# Patient Record
Sex: FEMALE | Race: WHITE | Employment: OTHER | URBAN - METROPOLITAN AREA
[De-identification: names, ages, dates, MRNs, and addresses within clinical notes are randomized per-mention and may not be internally consistent; named-entity substitution may affect disease eponyms.]

---

## 2017-01-03 ENCOUNTER — OFFICE VISIT (OUTPATIENT)
Dept: NEUROPSYCHOLOGY | Facility: CLINIC | Age: 82
End: 2017-01-03

## 2017-01-03 DIAGNOSIS — F02.80 LATE ONSET ALZHEIMER'S DISEASE WITHOUT BEHAVIORAL DISTURBANCE (H): Primary | ICD-10-CM

## 2017-01-03 DIAGNOSIS — G30.1 LATE ONSET ALZHEIMER'S DISEASE WITHOUT BEHAVIORAL DISTURBANCE (H): Primary | ICD-10-CM

## 2017-01-03 NOTE — PROGRESS NOTES
The patient was seen for neuropsychological evaluation at the request of Yadira Portillo for the purposes of diagnostic clarification and treatment planning.  One hour of face-to-face testing were provided by this writer.  Please see Dr. Jacquie Bates's report for a full interpretation of the findings.  Miriam Mallory  Psychometrist

## 2017-01-11 ENCOUNTER — TELEPHONE (OUTPATIENT)
Dept: FAMILY MEDICINE | Facility: CLINIC | Age: 82
End: 2017-01-11

## 2017-01-11 NOTE — PROGRESS NOTES
Neuropsychology Laboratory  [facility]  [address]  [place]  [address]  [phone]    NEUROPSYCHOLOGICAL EVALUATION      RELEVANT HISTORY AND REASON FOR REFERRAL:    Rhianna Gamboa is an 83-year-old  white woman showing signs of memory and functional decline, concerning for possible dementia.  The neurological history is unremarkable.  The general medical history is noteworthy for malignant melanoma of the right finger requiring partial amputation, anemia, osteoporosis, and hypothyroidism.  The only brain scan available is an 8/12/09 study, done to assess bilateral hearing loss and vertigo, which showed general atrophy and white matter changes consistent with small vessel ischemic disease.  This neuropsychological evaluation is requested by neurologist, Dr. Yadira Portillo, to assess brain functioning in detail and aid in diagnostic clarification and treatment planning.      The third of four children, she was born in [place] and grew up there, reared by her parents.  Her father was an  for [employer], her mother held factory jobs.  Ms. Gamboa reports  mediocre  grades.  Nevertheless she graduated from high school with no grade repeats.  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  Her only marriage, which lasted about 60 years, ended with the death of her  three years ago.  Only one of their five children live in [place].  Ms. Gamboa lives alone in a [facility] apartment.      BEHAVIORAL OBSERVATIONS AND CLINICAL INTERVIEW FINDINGS:    She arrived early, accompanied by her daughter, Arelis, who set up the appointment for her.  The patient presents as a heavyset older woman with short graying hair, neatly dressed in a fuchsia colored sweater, jeans, knit cap, and cold weather outerwear.  Mood was euthymic, affect appropriate.      She s lived alone  since her spouse  three years ago.  She does not receive regular services in her senior apartment, but has the option of having daily checks if needed in the future.  She manages all basic self-cares and keeps the apartment clean and tidy.  But there s some concern about medication management capabilities.  Her daughter fills a dated pill box weekly, still she doesn t always remember to take them.  Her daughter notices she forgets recent events (such as going out to dinner) and commonly loses personal items such as her car keys, credit card, etc.  She has been driving short distances during non-peak hours, but the family thinks it may be time for her to give up her driving privileges, partly due to physical frailty and questionable reaction times.  She s been a little slow to learn new procedures (how to operate the coffee machine).      She leads a fairly sedentary life.  She takes the paper and watches TV, but cannot easily recall any significant current events.  At first she didn t know who won the recent presidential election, but when told, it seemed to come back to her.  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      There s not history of head trauma, stroke symptoms, seizures, or diseases of the brain.  She has worked with a physical therapist to improve mobility, her problems attributed to unilateral hip weakness.  Surgeries include knee replacement and partial amputation of the right middle finger to treat melanoma, with regular, ongoing follow-up.      She has always been even-tempered, not prone to mood or personality disturbance.  She has not sought or needed mental health treatment of any kind.  They don t notice any change in disposition or personality.      She has never been a tobacco or regular drug user, nor has she used alcohol on a regular basis.      The current medications are levothyroxine, raloxifene, simvastatin, oxybutyrin,  cetirizine, Ranitidine, naproxen sodium, vitamin B-12, and baby aspirin.      There is no clear family history of dementia or other central nervous system diseases.  A maternal grandfather was a heavy drinker.  The maternal grandmother had breast cancer.  She has a sister with heart disease.      Throughout testing she was fully cooperative and socially appropriate, sufficiently alert and attentive.  She had no obvious trouble understanding or following directives, and seemed to make an earnest effort throughout.  Word searching was noticeable on all verbally demanding tests.  She was observed to be somewhat off-balanced and slow to stand, with a shuffling gait.  Results are considered technically valid and an accurate reflection of current, cognitive capabilities.      NEUROPSYCHOLOGICAL FINDINGS:    Select intellectual abilities were assessed with subtests from the Wechsler Adult Intelligence Scale-IV.  Auditory attention span on a digit sequence learning exercise (Digit Span) was impaired.  She could repeat up to five digits (inconsistently) in the forward direction, but only two in the reverse order.  Speeded graphomotor learning (Coding) was below average.  She was accurate but slow on this timed transcription task.  Social understanding, practical problem solving, and verbal reasoning (Comprehension) was also below average.  Word knowledge and expressive communicability (Vocabulary) was low average.  Visuospatial processing and constructional abilities (Block Design) were a relative strength, in the average range.      She was disoriented to time, one day off on the day of the week, two years off on the year, and an hour and 15 minutes off on the time of day.  Immediate memory for two story passages from the Wechsler Memory Scale-Revised was impaired with only one of 50 story elements recalled immediately after presentation.  Recall of the stories 30 minutes later was borderline impaired, and she actually  recalled a couple of details from the first story not originally reported, but remembered nothing of the second story, despite cueing.  Acquisition of a word list (Renard Auditory Verbal Learning Test) was borderline impaired for learning over trials, with no more than two or three words recalled after each trial.  Retention of the list was impaired (nil) after a brief distractor exercise and longer delay.  None of the figures were recognized when presented among a list of foils.  Immediate memory for figural material (four designs from the WMS) was borderline impaired, with significant perseverations.  None of the figures were freely recalled after a delay, and she did not benefit from visual cues, nor did she recognize any of the figures when presented in multiple choice format.  Copy drawings of three Hair-Gestalt figures were fairly well executed despite workover and simplifications.  One of the figures was recalled immediately after presentation, which is grossly within normal limits for her age.      Nonverbal associative fluency on the Make A Figure Test (producing novel designs under time constraints) was below average, and she had difficulty monitoring performance, repeating some of the designs.  Her copy drawing of a complex figure (Renard Osterreith) was characterized by placement and proportional errors, but still within the normal range for her age.      Comprehension, as measured by the Token Test, was mildly impaired.  All of the errors were on the more advanced items in which she had to comprehend and carry out multistep commands.  Verbal associative fluency on the Controlled Oral Word Association Test (generating words beginning with target letters) was impaired with just seven countable responses produced across the three 60-second trials.  Confrontation naming on the [place] Naming Test was mildly impaired, with 45 of 60 pictured items correctly, spontaneously named.  There were several perseverative  type errors, but no clear instances of visual misperception.      Fine motor speed and dexterity (Grooved Pegboard) was mildly impaired (slowed) bilaterally, the left (nondominant) hand slightly faster than the right.  She is missing the tip of her right middle finger, which may have slowed her down a bit using that hand.    A biletter cancellation exercise requiring efficient visual scanning and sustained vigilance was completed very slowly and still with 18 omission and commission errors, spread throughout the page.      CONCLUSIONS AND RECOMMENDATIONS:    This pleasant 83-year-old woman has been  for the last three years and lives alone in a senior apartment, with oversight from her daughter.  She is managing ADLs and keeps the apartment tidy, but is not reliable in remembering medications, and shows signs of forgetfulness.  She continues to drive, albeit short distances during non-peak travel time, but her family is becoming concerned about her ability to safely manage that, partly because of her physically frailty and slowed reaction times.  The neurological and psychiatric histories are unremarkable.  The only major health problem has been melanoma of the right finger, treated with partial amputation.  A brain MRI taken on 8/12/09, to assess bilateral hearing loss and vertigo, showed general atrophy and white matter changes consistent with small vessel ischemic disease.      The test findings are abnormal.  She is disoriented to time with very poor memory under verbal and nonverbal conditions.  That is, her ability to learn and retain new information of either a verbal or figural sort is greatly compromised.  Auditory attention span/short term memory is also impaired.  Psychomotor speeds are mildly slowed bilaterally.  Speeded word retrieval is quite impaired while verbal comprehension and confrontation naming are mildly impaired.  Drawings are well-executed and there are no signs of visual  misperception (agnosia).  She is slow and inaccurate on a letter cancellation exercise, but there are no indications of a visual field cut or hemispatial visual neglect on any of the visually demanding tasks.  Drawings are fairly well-executed.  She is perseverative on some of the tasks.  Relative strengths include below average processing speeds and verbal comprehension, low average vocabulary, and average visuospatial processing.      These abnormalities are not attributable to normal aging, and instead imply multifocal brain disease, with particular implications for the temporal lobes.  The pattern of findings and gradual decline is most concerning for an early to moderately advanced Alzheimer s disease.      At this point she is managing in her senior apartment with oversight from her daughter.  She seems to be managing basic self-cares and keeping her environment neat, but does not reliably remember to take medications, despite a dated pillbox.  It s recommended that someone call or physically check on her daily to remind her to take the medications.  A trusted family member should be involved in any major decisions, including financial management and healthcare decisions.  I share her family s concern about driving safety, in part because of her physical condition, but that is best assessed through a formal driving test.  Fortunately, she remains in good spirits and shows no signs of emotional or behavioral disturbance.  Family members should be aware that this is likely a progressive disease that will necessitate and more assistance and oversight in the foreseeable future.        Jacquie Bates Psy.D.   Licensed Psychologist, L.P. [id number]  Diplomate in Clinical Neuropsychology, [facility]    The diagnostic impression for the purposes of this evaluation is Alzheimer s disease, late onset, without behavior or emotional disturbance.  This evaluation included approximately three hours of testing administered by a  psychometrist with interpretation by a neuropsychologist (CPT 97607) and an additional three hours of professional time spent on the interview, data integration, record review, and report preparation (CPT 37712).    DDR: (AST)

## 2017-01-11 NOTE — TELEPHONE ENCOUNTER
Reason for Call:  Other hip pain-otc recommendations    Detailed comments: pt's daughter calling stating she wouldn't be able to get her mother out of the house and to her appt in time and was wondering if Dr Olivo had any otc suggestions to help with her mothers hip pain.    Phone Number Patient can be reached at: Other phone number:  Arelis [phone]    Best Time: any    Can we leave a detailed message on this number? YES    Call taken on 1/11/2017 at 10:54 AM by Nhung Winters

## 2017-01-11 NOTE — PROGRESS NOTES
LAU ORIENTATION TEST WAIS-IV                                  Raw             Age Scaled    Score  77  Vocabulary  28     8      Block Design  24   10        WECHSLER MEMORY SCALES Coding  27     7        Immed.   30 Min Digit Span  12     4     Logical Memory  0/1   2/0  Comprehension  15     7    Visual Reproduction   2     0     30 Minute Recognition   0    PSYCHOMOTOR TESTS       BRIDGETT AUDITORY VERBAL LEARNING TEST  Right     Left    Grooved Pegboard       126    122       I  II  III IV  V VI VII  Drops: 1          Drops: 1     2   2   2   3   3   2   0      MAKE A FIGURE TEST    30 Minute Recall   0         30 Minute Recognition   0      Score    9     Intrusions   0       CONTROLLED WORD ASSOCIATION TEST   LETTER CANCELLATION TEST     Score  7     Time  358   Errors     18      TOKEN TEST   DEWITT-GESTALT (3-figure)     Score  153    Recall    0.5         BOSTON NAMING TEST        Score  45         BRIDGETT COMPLEX FIGURE TEST         Raw Score T-score %tile    Copy  27   --   >16

## 2017-01-11 NOTE — TELEPHONE ENCOUNTER
Arelis called and advised mom is already on aleve.  Can only take tylenol can try the OTC lidocaine patches.  Heat.  Needs appt for controlled pain medication. Sarah FLORES RN  Per daughter no trauma to the hip area no fall. Sarah FLORES RN

## 2017-02-15 ENCOUNTER — OFFICE VISIT (OUTPATIENT)
Dept: NEUROLOGY | Facility: CLINIC | Age: 82
End: 2017-02-15
Payer: COMMERCIAL

## 2017-02-15 VITALS
DIASTOLIC BLOOD PRESSURE: 69 MMHG | RESPIRATION RATE: 16 BRPM | SYSTOLIC BLOOD PRESSURE: 142 MMHG | BODY MASS INDEX: 27.97 KG/M2 | HEART RATE: 92 BPM | WEIGHT: 152 LBS | HEIGHT: 62 IN

## 2017-02-15 DIAGNOSIS — G30.1 LATE ONSET ALZHEIMER'S DISEASE WITHOUT BEHAVIORAL DISTURBANCE (H): Primary | ICD-10-CM

## 2017-02-15 DIAGNOSIS — F02.80 LATE ONSET ALZHEIMER'S DISEASE WITHOUT BEHAVIORAL DISTURBANCE (H): Primary | ICD-10-CM

## 2017-02-15 PROCEDURE — 99215 OFFICE O/P EST HI 40 MIN: CPT | Performed by: PSYCHIATRY & NEUROLOGY

## 2017-02-15 NOTE — PROGRESS NOTES
ESTABLISHED PATIENT NEUROLOGY NOTE    DATE OF VISIT: 2/15/2017  MRN: [id number]  PATIENT NAME: Rhianna Gamboa  YOB: 1933    Chief Complaint   Patient presents with     RECHECK     Memory Issues, Results Neuropsych      SUBJECTIVE:                                                      HISTORY OF PRESENT ILLNESS:  Rhianna is here for follow up to discuss the Neuropsych test results. The patients results are consistent with Alzheimer's Disease. The patient is accompanied by her daughter in clinic today. One concern that has come up in the past is medication compliance and help around home. The patient now has family checking in most days with home health aides coming to visit for 3 hours twice per week. This has helped with the medication issue, simple housekeeping and also keeps the patient more mentally-engaged (they exercise and play games with her too). One concern was having someone work with the patient to do her physical therapy exercises for balance. She needs encouragement to do these. The patient tells her daughter that she does not need these services, but is not upset about the family's assistance to continue. No concerns about behavior, personality changes. She has not been driving, but the patient's daughter asks if I can reiterate the safety concerns and the reasoning for her not driving. The patient says she has no concerns today.     Per Dr. Bates's documentation (1.3.17):  CONCLUSIONS AND RECOMMENDATIONS:     This pleasant 83-year-old woman has been  for the last three years and lives alone in a senior apartment, with oversight from her daughter. She is managing ADLs and keeps the apartment tidy, but is not reliable in remembering medications, and shows signs of forgetfulness. She continues to drive, albeit short distances during non-peak travel time, but her family is becoming concerned about her ability to safely manage that, partly because of her physically frailty and slowed  reaction times. The neurological and psychiatric histories are unremarkable. The only major health problem has been melanoma of the right finger, treated with partial amputation. A brain MRI taken on 8/12/09, to assess bilateral hearing loss and vertigo, showed general atrophy and white matter changes consistent with small vessel ischemic disease.      The test findings are abnormal. She is disoriented to time with very poor memory under verbal and nonverbal conditions. That is, her ability to learn and retain new information of either a verbal or figural sort is greatly compromised. Auditory attention span/short term memory is also impaired. Psychomotor speeds are mildly slowed bilaterally. Speeded word retrieval is quite impaired while verbal comprehension and confrontation naming are mildly impaired. Drawings are well-executed and there are no signs of visual misperception (agnosia). She is slow and inaccurate on a letter cancellation exercise, but there are no indications of a visual field cut or hemispatial visual neglect on any of the visually demanding tasks. Drawings are fairly well-executed. She is perseverative on some of the tasks. Relative strengths include below average processing speeds and verbal comprehension, low average vocabulary, and average visuospatial processing.      These abnormalities are not attributable to normal aging, and instead imply multifocal brain disease, with particular implications for the temporal lobes. The pattern of findings and gradual decline is most concerning for an early to moderately advanced Alzheimer s disease.      At this point she is managing in her senior apartment with oversight from her daughter. She seems to be managing basic self-cares and keeping her environment neat, but does not reliably remember to take medications, despite a dated pillbox. It s recommended that someone call or physically check on her daily to remind her to take the medications. A trusted  family member should be involved in any major decisions, including financial management and healthcare decisions. I share her family s concern about driving safety, in part because of her physical condition, but that is best assessed through a formal driving test. Fortunately, she remains in good spirits and shows no signs of emotional or behavioral disturbance. Family members should be aware that this is likely a progressive disease that will necessitate and more assistance and oversight in the foreseeable future.        CURRENT MEDICATIONS:     Current Outpatient Prescriptions on File Prior to Visit:  levothyroxine (SYNTHROID) 75 MCG tablet Take 1 tablet (75 mcg) by mouth daily Please refill, she lost her medication.   raloxifene (EVISTA) 60 MG tablet Take 1 tablet (60 mg) by mouth daily Please refill, she lost her medication.   simvastatin (ZOCOR) 10 MG tablet Take 1 tablet (10 mg) by mouth At Bedtime Please refill, she lost her medication.   oxybutynin (DITROPAN XL) 10 MG 24 hr tablet Take one by mouth daily. MUST MAKE APPT BEFORE FURTHER REFILLS   cetirizine (ZYRTEC) 10 MG tablet Take 1 tablet (10 mg) by mouth daily   Ranitidine HCl (RANITIDINE 75 PO) Take 1 tablet by mouth daily.   Naproxen Sodium (ALEVE) 220 MG capsule Take 220 mg by mouth 2 times daily (with meals). PRN   ASPIRIN 81 MG OR TABS 1 TABLET BY MOUTH DAILY   CALCIUM 600 + D 600-200 MG-UNIT OR TABS 2 TABLET BY MOUTH DAILY   MULTIPLE VITAMIN OR TABS 1 TABLET BY MOUTH DAILY   cyanocolbalamin (VITAMIN B-12) 1000 MCG tablet Take 1,000 mcg by mouth daily Reported on 2/15/2017     No current facility-administered medications on file prior to visit.     RECENT DIAGNOSTIC STUDIES:      Results for orders placed or performed during the hospital encounter of 11/30/16   PET Oncology Whole Body    Narrative    PET/CT SKULL BASE TO MID THIGH LOCALIZATION CT WITHOUT IV CONTRAST    11/30/2016 12:02 PM     HISTORY: Follow-up melanoma. November 13, 2015 1 resection  of  cutaneous melanoma distal right third finger. January 20, 2016 right  distal middle finger amputation and right axillary node biopsy.    COMPARISON EXAMS:  SUBURBAN IMAGING: None.  FAIRVIEW: 8/31/2016 PET/CT. No evidence for hypermetabolic malignancy.  OTHER: None.    TECHNIQUE: Initial noncontrast CT was performed for attenuation  correction purposes. The patient is then injected intravenously with  11.38 mCi of F-18 FDG. Whole body PET/CT performed.. Blood glucose: 95  mg/dL. The CT, PET and fusion images are then evaluated on a [employer]  workstation. Radiation dose for this scan was reduced using automated  exposure control, adjustment of the mA and/or kV according to patient  size, or iterative reconstruction technique.    FINDINGS: Normal physiologic uptake is identified within the salivary  glands, myocardium, kidneys, ureters and bladder.  Scattered areas of  physiologic bowel uptake are also present.    NECK:  Lymph nodes: No pathologic activity.    Additional findings: Mild right maxillary mucosal thickening    CHEST:  Lungs: No pathologic activity. Very mild mosaic pattern to the lungs  could represent patchy areas of air trapping or patchy slight  groundglass infiltrate.    Lymph nodes: No pathologic activity. Nonenlarged partially calcified  right paratracheal, precarinal and hilar nodes compatible with  granulomatous disease. No axillary adenopathy or noncalcified  mediastinal adenopathy.    Additional findings: None    ABDOMEN/PELVIS:  Hepatobiliary: No pathologic activity. The gallbladder wall appears  thickened and slightly irregular in appearance. Recommend correlation  with gallbladder ultrasound.  Liver appears normal.    Spleen: No pathologic activity. Normal-appearing spleen.    Pancreas: No pathologic activity. Normal.    Kidneys: No pathologic activity. Mildly prominent bilateral extrarenal  pelves. No focal renal lesions identified.    Adrenals: No pathologic activity. Minimal left  adrenal thickening.  Normal right adrenal.    Reproductive: No pathologic activity.    Gastrointestinal: No pathologic activity. The bowel appears normal as  seen with CT technique.    Lymph nodes: No pathologic activity. 1.2 cm proximal right inguinal  node just medial to common femoral vessels. No periaortic aortic or  pelvic adenopathy.    Additional findings:   Atherosclerotic vascular calcification.    SKELETON:   No pathologic activity. Fairly prominent superficial venous  varicosities along the medial left thigh and calf. Superficial  varicosities also identified along the medial and lateral right calf.  Right knee arthroplasty. Degenerative changes in the spine.There is a  focal area of increased FDG uptake which appears to be outside of the  patient on fused axial image 275. This has a SUV max of 1.9 and is  along the anterior lateral right upper abdomen at the level of the  kidneys corresponding to CT image 275. Recommend clinical correlation  for any cutaneous lesion at this site      Impression    IMPRESSION:   1. There is a focal area of increased FDG uptake which is questionably  outside of the patient on fused axial image 275. This has a SUV max of  1.9 and is along the anterior lateral right upper abdomen at the level  of the kidneys corresponding to CT image 275. Recommend clinical  correlation for any exophytic cutaneous lesion at this site.  2. No other evidence for hypermetabolic metastatic disease in the  chest abdomen or pelvis.  3. Gallbladder wall appears irregular and thickened. Further  evaluation with gallbladder ultrasound is recommended but this is not  hypermetabolic in appearance.    EARLE QUIROS MD   Creatinine POCT   Result Value Ref Range    Creatinine 0.8 0.52 - 1.04 mg/dL    GFR Estimate 69 >60 mL/min/1.7m2    GFR Estimate If Black 83 >60 mL/min/1.7m2       REVIEW OF SYSTEMS:                                                      10-point review of systems is negative except as  "mentioned above in HPI.     EXAM:                                                      Physical Exam:   Vitals: /69 (BP Location: Left arm, Patient Position: Chair, Cuff Size: Adult Regular)  Pulse 92  Resp 16  Ht 5' 2\" (1.575 m)  Wt 152 lb (68.9 kg)  BMI 27.8 kg/m2  BMI= Body mass index is 27.8 kg/(m^2).  GENERAL: NAD.   Focused Neurologic:  MENTAL STATUS: Alert, attentive. Speech is fluent. Fair comprehension. Fair concentration.  CRANIAL NERVES: Facial movement normal. EOM full. Slightly hard of hearing.   CV: RRR. S1, S2.   NECK: No bruits.  Remainder if exam deferred to allow for discussion time.       ASSESSMENT and PLAN:                                                      Assessment and Plan:    ICD-10-CM    1. Late onset Alzheimer's disease without behavioral disturbance G30.1 CARE COORDINATION REFERRAL    F02.80        Ms. Gamboa is a pleasant 82 yo woman with history of melanoma, hypothyroidism and HLD seen in neurology for memory concerns. We spent the majority of today's visit discussing the results of the neuropsychological evaluation. The patient's results are consistent with Alzheimer's Disease as described above. We talked about treatment, prognosis and planning. The patient's daughter is very involved in her care. They have been taking steps to have more supervision in the patient's home and there are currently no safety concerns. The patient seems to be adapting to the changes without too much difficulty. She is not driving and also seems okay with this, with my endorsement. They have taken legal steps regarding future decision-making as well. All of her questions were answered.     We discussed trying Aricept for memory. Side effects discussed. The patient would like to think about this.     Patient Instructions:  Consider Aricept (donepezil) for memory. *Information provided.   We have provided a copy of your Neuropsych results as well.   Send the forms for your legal documentation, and we " will get these filled out for you.  Keep active socially, physically and mentally as much as you are able.   I recommend not driving, for safety.  Referral to Care Coordinator for community resources.   Return to clinic in 6 months or sooner if concerns arise.       Total Time: 40 minutes were spent with the patient and her daughter. More than 50% of the time spent on counseling (as described above in Assessment and Plan) /coordinating the care.    Yadira Portillo MD  Neurology

## 2017-02-15 NOTE — PATIENT INSTRUCTIONS
Plan:    Consider Aricept (donepezil) for memory. *Information provided.   We have provided a copy of your Neuropsych results as well.   Send the forms for your legal documentation, and we will get these filled out for you.  Keep active socially, physically and mentally as much as you are able.   Referral to Care Coordinator for community resources.   Return to clinic in 6 months or sooner if concerns arise.

## 2017-02-15 NOTE — NURSING NOTE
"Chief Complaint   Patient presents with     RECHECK     Memory Issues, Results Neuropsych        Initial /69 (BP Location: Left arm, Patient Position: Chair, Cuff Size: Adult Regular)  Pulse 92  Resp 16  Ht 5' 2\" (1.575 m)  Wt 152 lb (68.9 kg)  BMI 27.8 kg/m2 Estimated body mass index is 27.8 kg/(m^2) as calculated from the following:    Height as of this encounter: 5' 2\" (1.575 m).    Weight as of this encounter: 152 lb (68.9 kg).  BP completed using cuff size: jerry Orozco CMA     "

## 2017-02-15 NOTE — MR AVS SNAPSHOT
After Visit Summary   2/15/2017    Rhianna Gamboa    MRN: [id number]           Patient Information     Date Of Birth          6/19/1933        Visit Information        Provider Department      2/15/2017 2:15 PM Yadira Portillo MD [facility]        Today's Diagnoses     Late onset Alzheimer's disease without behavioral disturbance    -  1      Care Instructions    Plan:    Consider Aricept (donepezil) for memory. *Information provided.   We have provided a copy of your Neuropsych results as well.   Send the forms for your legal documentation, and we will get these filled out for you.  Keep active socially, physically and mentally as much as you are able.   Referral to Care Coordinator for community resources.   Return to clinic in 6 months or sooner if concerns arise.         Follow-ups after your visit        Additional Services     CARE COORDINATION REFERRAL       Services are provided by a Care Coordinator for people with complex needs such as: medical, social, or financial troubles.  The Care Coordinator works with the patient and their Primary Care Provider to determine health goals, obtain resources, achieve outcomes, and develop care plans that help coordinate the patient's care.     Reason for Referral: Caregiver Concerns    Provide additional details for Care Coordination to best meet the patient's current needs: Patient newly diagnosed with Alzheimer's    Clinical Staff have discussed the Care Coordination Referral with the patient and/or caregiver: yes                  Your next 10 appointments already scheduled     Mar 29, 2017  8:30 AM CDT   PET ONCOLOGY WHOLE BODY with [id number]   [facility] Pet CT ([facility])    [address] [facility] [address] [phone]           Tell your doctor:   If there is any chance you may be pregnant or if you are breastfeeding.   If you have problems lying in small spaces (claustrophobia). If you do, your  doctor may give you medicine to help you relax. If you have diabetes:   Have your exam early in the morning. Your blood glucose will go up as the day goes by.   Your glucose level must be 180 or less at the start of the exam. Please take any medicines you need to ensure this blood glucose level. 24 hours before your scan: Don t do any heavy exercise. (No jogging, aerobics or other workouts.) Exercise will make your pictures less accurate. 6 hours before your scan:   Stop all food and liquids (except water).   Do not chew gum or suck on mints.   If you need to take medicine with food, you may take it with a few crackers.  Please call your Imaging Department at your exam site with any questions.            Apr 07, 2017  3:00 PM CDT   (Arrive by 2:45 PM)   Return Visit with Светлана Vang MD   [facility] ([facility])    [address] [place] [address] [phone]              Who to contact     If you have questions or need follow up information about today's clinic visit or your schedule please contact [facility] directly at [phone].  Normal or non-critical lab and imaging results will be communicated to you by MyChart, letter or phone within 4 business days after the clinic has received the results. If you do not hear from us within 7 days, please contact the clinic through MyChart or phone. If you have a critical or abnormal lab result, we will notify you by phone as soon as possible.  Submit refill requests through [facility] or call your pharmacy and they will forward the refill request to us. Please allow 3 business days for your refill to be completed.          Additional Information About Your Visit        [employer] Information     [facility] lets you send messages to your doctor, view your test results, renew your prescriptions, schedule appointments and more. To sign up, go to [email or web address] . Click on  "\"Log in\" on the left side of the screen, which will take you to the Welcome page. Then click on \"Sign up Now\" on the right side of the page.     You will be asked to enter the access code listed below, as well as some personal information. Please follow the directions to create your username and password.     Your access code is: [email or web address]  Expires: 2017  6:30 AM     Your access code will  in 90 days. If you need help or a new code, please call your [facility] clinic or [phone].        Care EveryWhere ID     This is your Care EveryWhere ID. This could be used by other organizations to access your [facility] medical records  [id number]        Your Vitals Were     Pulse Respirations Height BMI (Body Mass Index)          92 16 5' 2\" (1.575 m) 27.8 kg/m2         Blood Pressure from Last 3 Encounters:   02/15/17 142/69   16 115/69   16 144/77    Weight from Last 3 Encounters:   02/15/17 152 lb (68.9 kg)   16 152 lb 8 oz (69.2 kg)   16 153 lb 12.8 oz (69.8 kg)              We Performed the Following     CARE COORDINATION REFERRAL        Primary Care Provider Office Phone # Fax #    Omar Olivo [phone]       [facility] [address] [facility] [address]        Thank you!     Thank you for choosing [facility]  for your care. Our goal is always to provide you with excellent care. Hearing back from our patients is one way we can continue to improve our services. Please take a few minutes to complete the written survey that you may receive in the mail after your visit with us. Thank you!             Your Updated Medication List - Protect others around you: Learn how to safely use, store and throw away your medicines at www.disposemymeds.org.          This list is accurate as of: 2/15/17  3:05 PM.  Always use your most recent med list.                   Brand Name Dispense Instructions for use    ALEVE 220 MG capsule   Generic " drug:  naproxen sodium      Take 220 mg by mouth 2 times daily (with meals). PRN       aspirin 81 MG tablet     100    1 TABLET BY MOUTH DAILY       CALCIUM 600 + D 600-200 MG-UNIT Tabs     3 MONTHS    2 TABLET BY MOUTH DAILY       cetirizine 10 MG tablet    zyrTEC    90 tablet    Take 1 tablet (10 mg) by mouth daily       cyanocobalamin 1000 MCG tablet    vitamin  B-12    100 tablet    Take 1,000 mcg by mouth daily Reported on 2/15/2017       levothyroxine 75 MCG tablet    SYNTHROID    90 tablet    Take 1 tablet (75 mcg) by mouth daily Please refill, she lost her medication.       Multiple vitamin Tabs      1 TABLET BY MOUTH DAILY       oxybutynin 10 MG 24 hr tablet    DITROPAN XL    90 tablet    Take one by mouth daily. MUST MAKE APPT BEFORE FURTHER REFILLS       raloxifene 60 MG tablet    EVISTA    90 tablet    Take 1 tablet (60 mg) by mouth daily Please refill, she lost her medication.       RANITIDINE 75 PO      Take 1 tablet by mouth daily.       simvastatin 10 MG tablet    ZOCOR    90 tablet    Take 1 tablet (10 mg) by mouth At Bedtime Please refill, she lost her medication.

## 2017-02-17 ENCOUNTER — CARE COORDINATION (OUTPATIENT)
Dept: CARE COORDINATION | Facility: CLINIC | Age: 82
End: 2017-02-17

## 2017-02-17 NOTE — LETTER
[facility]ARE COORDIN[facility]  [address] [place] [address]  [phone]      February 17, 2017      Rhianna Gamboa  [address]   [place] [address]    Dear Rhianna Infante,  I am the Clinic Care Coordinator that works with your primary care provider's clinic. I wanted to thank you for spending the time to talk with me.  Below is a description of what Clinic Care Coordination is and how I can further assist you.     The Clinic Care Coordinator role is a Registered Nurse and/or  who understands the health care system. The goal of Clinic Care Coordination is to help you manage your health and improve access to the [facility] system in the most efficient manner.  The Registered Nurse can assist you in meeting your health care goals by providing education, coordinating services, and strengthening the communication among your providers. The  can assist you with financial, behavioral, psychosocial, and chemical dependency and counseling/psychiatric resources.    Please feel free to keep this letter and contact information to contact me at [phone] with any further questions or concerns that may arise. We at [facility] are focused on providing you with the highest-quality healthcare experience possible and that all starts with you.       Sincerely,     Sushma Riley   Care Coordinator  [facility]:  [facility]    Enclosed: I have enclosed a copy of a 24 Hour Access Plan. This has helpful phone numbers for you to call when needed. Please keep this in an easy to access place to use as needed.

## 2017-02-17 NOTE — LETTER
Health Care Home - Access Care Plan    About Me  Patient Name:  Rhianna Gamboa    YOB: 1933  Age:                            83 year old   [place] MRN:         [id number] Telephone Information:     Home Phone [phone]   Mobile NONE       Address:    [address]     [place] [address] Email address:  No e-mail address on record      Emergency Contact(s)  Name Relationship Lgl Grd Work Phone Home Phone Mobile Phone   1. REMIGIO GAMBOA Daughter  none [phone]   2. LEONORA MORALES Relative  none [phone] none             Health Maintenance: Routine Health maintenance Reviewed: Not assessed    My Access Plan  Medical Emergency 911   Questions or concerns during clinic hours Primary Clinic Line, I will call the clinic directly: Primary Clinic: [facility] [phone]   24 Hour Appointment Line [phone] or  [phone])  (toll free)   24 Hour Nurse Line [phone] (toll free)   Questions or concerns outside clinic hours 24 Hour Appointment Line, I will call the after-hours on-call line:   [facility] [phone] or [phone] ([phone]) (toll-free)   Preferred Urgent Care Preferred Urgent Care: [facility], [phone]   Preferred Hospital Preferred Hospital: [place], [place]  [phone]   Preferred Pharmacy MAIL ORDER - NO PHONE     Behavioral Health Crisis Line [facility], [phone] or 911     My Care Team Members  Patient Care Team       Relationship Specialty Notifications Start End    Omar Olivo MD PCP - General   12/14/06     Phone: [phone] Fax: [phone]         [facility] [address] [facility] [address]    Светлана Lopez MD MD Hematology & Oncology Abnormal results only, Admissions 2/8/16     Phone: [phone] Fax: [phone]         [address] [address]    [place]  Tracie RAMIREZ RN Registered Nurse Nurse Admissions 9/8/16     Phone: [phone] Pager: [phone]        Yadira Portillo MD Referring Physician Neurosurgery  12/15/16     Comment:  referring to neuropsych    Phone: [phone] Fax: [phone]         [address] [facility] [address]    Jacquie Bates,    Psychology  12/15/16     Phone: [phone] Fax: [phone]         [address] [facility] [address]    Sushma Ramos   Admissions 2/17/17     Phone: [phone] Fax: [phone]            My Medical and Care Information  Problem List   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

## 2017-02-17 NOTE — PROGRESS NOTES
Clinic Care Coordination Contact  OUTREACH    LAKE placed call, spoke with pt, however, she was unable to answer many of my questions.  LAKE found NANY in pt's EMR and placed call to her daughter, Arelis, [phone].  Most of the content below came from conversation with pt's daughter.    Referral Information:  Referral Source: Specialist (Neurology)  Reason for Contact: Pt with new Alzheimer's dx; family wanted community resources and/or educational information on Alzheimers  Care Conference: No     Universal Utilization:   ED Visits in last year: 0  Hospital visits in last year: 0  Last PCP appointment: 12/06/16  Missed Appointments: 0  Concerns: yes  Multiple Providers or Specialists: yes    Clinical Concerns:  Current Medical Concerns: Pt with new Alzheimer's dx, has concerns with short term memory & doesn't remember to eat or take medications, per Arelis.   Current Behavioral Concerns: None    Education Provided to patient: Griselda discussed several community resources available to the pt and to family.        Clinical Pathway: None    Medication Management:  Pt is now compliant as family/Home Care staff encourage taking meds daily.    Functional Status:  Mobility Status: Independent  Equipment Currently Used at Home: none  Transportation: Pt no longer drives.  Has family/Home Care staff who are able to transport wherever pt wishes to travel.        Psychosocial:  Current living arrangement:: I live alone (In[address] Apartment)  Financial/Insurance: Per Arelis, pt is financially sound & will be well taken care of.  Insurance is Medicare-[employer]    LAKE and Arelis discussed pt's current situation.  Pt has children who are able to assist with calling her daily for medication reminders & to visit often.  Arelis visits a minimum of 2x/week and attends all MD appointments with the pt.        Resources and Interventions:  Current Resources: [facility] paid 2X/week--housekeeping,  shopping, bathing, meal prep, etc.        Advanced Care Plans/Directives on file:: No  Referrals Placed: Alzheimer's Association; Arelis wants to learn more about her mom's dx, prognosis and how to best care for her.     Goals:   Goal 1 Statement: I want to ensure that my mom is safe in her home as long as possible  Goal 1 Progression Percent: 40%  Goal 1 Progression Date: 02/17/17  Barriers: None identified  Strengths: Pt has supportive family who is already getting services in place for caring for the pt   Patient/Caregiver understanding: Arelis to educate self on Alz & share the knowledge with her siblings  Frequency of Care Coordination: Monthly & PRN        Plan: Arelis to continue to be the SW contact as pt unable to do so.  SW to provide emotional support, community resources and long term care assistance as needed.  SW also sent letter of introduction & care plan to pt & Arelis for their records.    Sushma Riley  Social Work Care Coordinator  [facility]  [phone]

## 2017-02-21 ENCOUNTER — TELEPHONE (OUTPATIENT)
Dept: NEUROLOGY | Facility: CLINIC | Age: 82
End: 2017-02-21

## 2017-02-21 NOTE — LETTER
I, [Physician Name], the undersigned licensed physician, state that I am the attending physician of ____________________; that I have been the person s physician since [month/day/year]; and that I examined the person on [month/day/year], and the results of my examination are stated below:  Diagnostic impression and description:  [ ]  Behavioral evidence that patient lacks sufficient understanding or capacity to make or communicate responsible personal decisions:   [ ]  DIAGNOSIS:  [ ]  PROGNOSIS:   [ ]  I [am] [am not] of the opinion that the patient lacks the mental capacity to understand and make reasonable decisions.  If you are aware of the existence of any of the above-mentioned documents, please provide additional information:  [ ]    Dated:     Signature of Attending Physician  Address:  [Address]  Telephone:  [Phone Number]  Facsimile:  [Fax Number]  E-mail:  [E-mail Address]

## 2017-02-21 NOTE — LETTER
I, Yadira Portillo MD, the undersigned licensed physician, state that I am an attending physician of  Rhianna Gamboa; that I have been the person s physician since 10/4/2016; and that I examined the person on 10/4/2016 and 2/15/2017, and the results of my examination (as well as additional testing through Neuropsychiatric evaluation) are stated below:  Diagnostic impression and description:  Alzheimer s Disease. Ms. Gamboa has very poor memory and slow psychomotor speed, based on our testing.  Behavioral evidence that patient lacks sufficient understanding or capacity to make or communicate responsible personal decisions:   Ms. Gamboa s cognitive impairment causes her to have difficulty with things such as remembering to take medications. There is also concern about her ability to care for herself, driving abilities, etc.  Per Dr. Bates s assessment (Neuropsychiatric evaluation 1.3.17):  A trusted family member should be involved in any major decisions, including financial management and healthcare decisions. I share her family s concern about driving safety, in part because of her physical condition, but that is best assessed through a formal driving test. Fortunately, she remains in good spirits and shows no signs of emotional or behavioral disturbance. Family members should be aware that this is likely a progressive disease that will necessitate and more assistance and oversight in the foreseeable future.  DIAGNOSIS: Early to moderately advanced Alzheimer s Disease  PROGNOSIS:   As above. Typically the progression of disease occurs over 5-8 years, but can be variable.  I am of the opinion that the patient lacks the mental capacity to understand and make reasonable decisions.  If you are aware of the existence of any of the above-mentioned documents, please provide additional information:   One could refer to the full neuropsychiatric evaluation (referenced above) and recommendations for further details.        Yadira Portillo MD  Neurology  [facility]

## 2017-03-02 NOTE — TELEPHONE ENCOUNTER
I had trouble opening the file at first, but now I can see the template. Will try to complete this by early next week.     CY

## 2017-03-09 ENCOUNTER — TELEPHONE (OUTPATIENT)
Dept: FAMILY MEDICINE | Facility: CLINIC | Age: 82
End: 2017-03-09

## 2017-03-09 NOTE — TELEPHONE ENCOUNTER
Reason for call:  Patient reporting a symptom    Symptom or request: Pt's daughter Arelis calling - Pt feels hot and cold and achy today - no other symptoms.    Duration (how long have symptoms been present): today    Have you been treated for this before? No    Additional comments:     Phone Number patient can be reached at:  Other phone number:  [phone]    Best Time:  any    Can we leave a detailed message on this number:  YES    Call taken on 3/9/2017 at 1:40 PM by Lola Taylor

## 2017-03-09 NOTE — TELEPHONE ENCOUNTER
S-(situation): Daughter reports the patient has been more fatigue, and body aches symptoms started on Wednesday night.    B-(background): Patient has dementia    A-(assessment):  Daughter reports the patient has been feeling hot and cold but no fever.  Patient appears to be in some pain.  Patient has been urinating today and did drink some fluids.  Patient denies any nausea, vomiting or diarrhea.  Patient has intermittent dry cough.  Patient does live alone but has family members checking on her often.     R-(recommendations): Advised daughter to have her be seen for further evaluation.  Advised daughter if the patient has worsening symptoms to bring to UC/ER.  Advised daughter to continue support cares.  Daughter agrees with the plan.    Tracie BENSON RN

## 2017-03-13 NOTE — TELEPHONE ENCOUNTER
Completed letter faxed to Ms Gamboa's , per her daughter's request.    [phone].  Reji Busch.  Transmission confirmed via Right Fax.

## 2017-03-15 DIAGNOSIS — C43.61: Primary | ICD-10-CM

## 2017-03-20 DIAGNOSIS — R32 UNSPECIFIED URINARY INCONTINENCE: ICD-10-CM

## 2017-03-20 DIAGNOSIS — R35.0 URINARY FREQUENCY: ICD-10-CM

## 2017-03-21 RX ORDER — OXYBUTYNIN CHLORIDE 10 MG/1
TABLET, EXTENDED RELEASE ORAL
Qty: 30 TABLET | Refills: 0 | Status: SHIPPED | OUTPATIENT
Start: 2017-03-21 | End: 2017-05-02

## 2017-03-29 ENCOUNTER — HOSPITAL ENCOUNTER (OUTPATIENT)
Dept: PET IMAGING | Facility: CLINIC | Age: 82
Discharge: HOME | End: 2017-03-29
Attending: INTERNAL MEDICINE | Admitting: INTERNAL MEDICINE
Payer: COMMERCIAL

## 2017-03-29 DIAGNOSIS — C43.61: ICD-10-CM

## 2017-03-29 PROCEDURE — 34300033 ZZH RX 343: Performed by: INTERNAL MEDICINE

## 2017-03-29 PROCEDURE — A9552 F18 FDG: HCPCS | Performed by: INTERNAL MEDICINE

## 2017-03-29 PROCEDURE — 78816 PET IMAGE W/CT FULL BODY: CPT | Mod: PS

## 2017-03-29 RX ADMIN — FLUDEOXYGLUCOSE F-18 12.81 MCI.: 500 INJECTION, SOLUTION INTRAVENOUS at 08:43

## 2017-04-07 ENCOUNTER — ONCOLOGY VISIT (OUTPATIENT)
Dept: ONCOLOGY | Facility: CLINIC | Age: 82
End: 2017-04-07
Attending: INTERNAL MEDICINE
Payer: COMMERCIAL

## 2017-04-07 VITALS
DIASTOLIC BLOOD PRESSURE: 71 MMHG | HEIGHT: 62 IN | WEIGHT: 147.4 LBS | BODY MASS INDEX: 27.12 KG/M2 | TEMPERATURE: 98.4 F | HEART RATE: 83 BPM | RESPIRATION RATE: 16 BRPM | SYSTOLIC BLOOD PRESSURE: 116 MMHG | OXYGEN SATURATION: 92 %

## 2017-04-07 DIAGNOSIS — C43.9 METASTATIC MELANOMA (H): Primary | ICD-10-CM

## 2017-04-07 PROCEDURE — 99213 OFFICE O/P EST LOW 20 MIN: CPT | Mod: ZP | Performed by: INTERNAL MEDICINE

## 2017-04-07 PROCEDURE — 99212 OFFICE O/P EST SF 10 MIN: CPT | Mod: ZF

## 2017-04-07 ASSESSMENT — PAIN SCALES - GENERAL: PAINLEVEL: NO PAIN (0)

## 2017-04-07 NOTE — MR AVS SNAPSHOT
After Visit Summary   4/7/2017    Rhianna Gamboa    MRN: [id number]           Patient Information     Date Of Birth          6/19/1933        Visit Information        Provider Department      4/7/2017 3:00 PM Светлана Lopez MD [facility]        Today's Diagnoses     Metastatic melanoma (H)    -  1       Follow-ups after your visit        Your next 10 appointments already scheduled     Oct 04, 2017  8:30 AM CDT   PET ONCOLOGY WHOLE BODY with [id number]   [facility] ([facility])    [address] [facility] [address]   [phone]           Tell your doctor:   If there is any chance you may be pregnant or if you are breastfeeding.   If you have problems lying in small spaces (claustrophobia). If you do, your doctor may give you medicine to help you relax. If you have diabetes:   Have your exam early in the morning. Your blood glucose will go up as the day goes by.   Your glucose level must be 180 or less at the start of the exam. Please take any medicines you need to ensure this blood glucose level. 24 hours before your scan: Don t do any heavy exercise. (No jogging, aerobics or other workouts.) Exercise will make your pictures less accurate. 6 hours before your scan:   Stop all food and liquids (except water).   Do not chew gum or suck on mints.   If you need to take medicine with food, you may take it with a few crackers.  Please call your Imaging Department at your exam site with any questions.            Oct 09, 2017  3:00 PM CDT   (Arrive by 2:45 PM)   Return Visit with Светлана Vang MD   [facility] ([facility])    [address] [place] [address] [phone]              Future tests that were ordered for you today     Open Future Orders        Priority Expected Expires Ordered    PET Oncology Whole Body Routine  4/7/2018 4/7/2017            Who to  "contact     If you have questions or need follow up information about today's clinic visit or your schedule please contact [facility] directly at [phone].  Normal or non-critical lab and imaging results will be communicated to you by MyChart, letter or phone within 4 business days after the clinic has received the results. If you do not hear from us within 7 days, please contact the clinic through [employer] or phone. If you have a critical or abnormal lab result, we will notify you by phone as soon as possible.  Submit refill requests through [employer] or call your pharmacy and they will forward the refill request to us. Please allow 3 business days for your refill to be completed.          Additional Information About Your Visit        [employer] Information     [employer] lets you send messages to your doctor, view your test results, renew your prescriptions, schedule appointments and more. To sign up, go to [email or web address] . Click on \"Log in\" on the left side of the screen, which will take you to the Welcome page. Then click on \"Sign up Now\" on the right side of the page.     You will be asked to enter the access code listed below, as well as some personal information. Please follow the directions to create your username and password.     Your access code is: [email or web address]  Expires: 2017  6:30 AM     Your access code will  in 90 days. If you need help or a new code, please call your [facility] clinic or [phone].        Care EveryWhere ID     This is your Care EveryWhere ID. This could be used by other organizations to access your [facility] medical records  [id number]        Your Vitals Were     Pulse Temperature Respirations Height Pulse Oximetry BMI (Body Mass Index)    83 98.4  F (36.9  C) (Oral) 16 1.575 m (5' 2.01\") 92% 26.95 kg/m2       Blood Pressure from Last 3 Encounters:   17 116/71   02/15/17 142/69   16 115/69    Weight from Last 3 Encounters:   17 " 66.9 kg (147 lb 6.4 oz)   02/15/17 68.9 kg (152 lb)   12/06/16 69.2 kg (152 lb 8 oz)               Primary Care Provider Office Phone # Fax #    Omar Olivo [phone]       [facility] [address] [facility] [address]        Thank you!     Thank you for choosing [facility]  for your care. Our goal is always to provide you with excellent care. Hearing back from our patients is one way we can continue to improve our services. Please take a few minutes to complete the written survey that you may receive in the mail after your visit with us. Thank you!             Your Updated Medication List - Protect others around you: Learn how to safely use, store and throw away your medicines at www.disposemymeds.org.          This list is accurate as of: 4/7/17  3:54 PM.  Always use your most recent med list.                   Brand Name Dispense Instructions for use    ALEVE 220 MG capsule   Generic drug:  naproxen sodium      Take 220 mg by mouth 2 times daily (with meals). PRN       aspirin 81 MG tablet     100    1 TABLET BY MOUTH DAILY       CALCIUM 600 + D 600-200 MG-UNIT Tabs     3 MONTHS    2 TABLET BY MOUTH DAILY       cetirizine 10 MG tablet    zyrTEC    90 tablet    Take 1 tablet (10 mg) by mouth daily       cyanocobalamin 1000 MCG tablet    vitamin  B-12    100 tablet    Take 1,000 mcg by mouth daily Reported on 2/15/2017       levothyroxine 75 MCG tablet    SYNTHROID    90 tablet    Take 1 tablet (75 mcg) by mouth daily Please refill, she lost her medication.       Multiple vitamin Tabs      1 TABLET BY MOUTH DAILY       oxybutynin 10 MG 24 hr tablet    DITROPAN XL    30 tablet    Take one by mouth daily. (Needs follow-up appointment for this medication)       raloxifene 60 MG tablet    EVISTA    90 tablet    Take 1 tablet (60 mg) by mouth daily Please refill, she lost her medication.       RANITIDINE 75 PO      Take 1 tablet by mouth daily.        simvastatin 10 MG tablet    ZOCOR    90 tablet    Take 1 tablet (10 mg) by mouth At Bedtime Please refill, she lost her medication.

## 2017-04-07 NOTE — NURSING NOTE
"Rhianna Gamboa is a 83 year old female who presents for:  Chief Complaint   Patient presents with     Oncology Clinic Visit     Melanoma Ca, 4 Mo F/U, PET Results        Initial Vitals:  /71 (BP Location: Left arm, Patient Position: Chair, Cuff Size: Adult Regular)  Pulse 83  Temp 98.4  F (36.9  C) (Oral)  Resp 16  Ht 1.575 m (5' 2.01\")  Wt 66.9 kg (147 lb 6.4 oz)  SpO2 92%  BMI 26.95 kg/m2 Estimated body mass index is 26.95 kg/(m^2) as calculated from the following:    Height as of this encounter: 1.575 m (5' 2.01\").    Weight as of this encounter: 66.9 kg (147 lb 6.4 oz).. Body surface area is 1.71 meters squared. BP completed using cuff size: regular  No Pain (0) No LMP recorded. Patient is postmenopausal. Allergies and medications reviewed.     Medications: Medication refills not needed today.  Pharmacy name entered into EPIC:    MAIL ORDER - NO PHONE  [facility] [employer], [place] - [address] AT [facility]  [facility] [employer] [place] - [address] AT [facility]  [facility] [place], [place] - [address]    Comments:     7 minutes for nursing intake (face to face time)   Gilma Jackson LPN        "

## 2017-04-07 NOTE — LETTER
4/7/2017       RE: Rhianna Gamboa  [address]     [place] [address]     Dear Colleague,    Thank you for referring your patient, Rhianna Gamboa, to the [facility]. Please see a copy of my visit note below.    MEDICAL ONCOLOGY PROGRESS NOTE  Apr 7, 2017    Oncology History:  1. 11/13/2015, she undergoes wide resection of a cutaneous, verrucous melanoma lesion involving the distal portion of the right third finger. Pathology showed evidence of invasive melanoma extending to deep and radial margins.  2. 12/1/2015, she undergoes nail avulsion and wide excision of the lesion with Dr. Keyes. Pathology showed an invasive melanoma consisting of spindled to focally epithelioid cells with prominent nucleoli and junctional nesting. Tumor cell proliferation was noted throughout the dermis to the base of the excision.Eusebio level IV, depth at least 1.86 mm. No angiolymphatic invasion seen.  3. 1/20/2016, she is referred to Dr. Rollins, and undergoes right distal middle finger amputation and right axillary lymph node biopsy. Pathology shows resected distal finger specimen without evidence of residual melanoma. The sentinel node showed 5 to 10% involvement, with multifocal subcapsular deposits of atypical epithelioid tumor cells seen. Mitotic figures seen. Tumor cells were positive for Melan A, tyrosinase, and HMB45.  4. 4/20/2016, PET-CT without evidence for metastatic disease  5. 8/31/2016, PET-CT negative for metastatic disease  6. 3/29/2017, PET-CT negative for metastatic disease.    HISTORY OF PRESENT ILLNESS  Mrs. Gamboa is a very pleasant 83 year old woman with resected stage IIIa cutaneous melanoma. She returns in follow-up. She continues to do well. She has not had any signs or symptoms suggestive of local recurrence. She has also noted no new or changing skin lesions. She follows with dermatology at [facility]. She is now living in a memory unit for assistance with daily  "care. She denies new lumps/bumps. No     IMAGING: I reviewed the PET-CT with Mrs. Gamboa and her daughter. This again shows no abnormal uptake to suggest metastatic disease.    REVIEW OF SYMPTOMS  A 12-point ROS negative except as in HPI    Past Medical History:   Diagnosis Date     Basal cell carcinoma      Malignant melanoma (H)      Past Surgical History:   Procedure Laterality Date     AMPUTATE FINGER(S) Right 1/20/2016    Procedure: AMPUTATE FINGER(S);  Surgeon: Brandon Rollins MD;  Location: [facility] OR     BIOPSY NODE SENTINEL N/A 1/20/2016    Procedure: BIOPSY NODE SENTINEL;  Surgeon: Brandon Rollins MD;  Location: [facility] OR     EYE SURGERY  7/2009    right cataract     EYE SURGERY  2010    both eyes     RELEASE CARPAL TUNNEL Right 5/12/2015    Procedure: RELEASE CARPAL TUNNEL;  Surgeon: Omar Balderrama MD;  Location: [facility] OR     Current Outpatient Prescriptions   Medication     oxybutynin (DITROPAN XL) 10 MG 24 hr tablet     levothyroxine (SYNTHROID) 75 MCG tablet     raloxifene (EVISTA) 60 MG tablet     simvastatin (ZOCOR) 10 MG tablet     cetirizine (ZYRTEC) 10 MG tablet     Ranitidine HCl (RANITIDINE 75 PO)     Naproxen Sodium (ALEVE) 220 MG capsule     cyanocolbalamin (VITAMIN B-12) 1000 MCG tablet     ASPIRIN 81 MG OR TABS     CALCIUM 600 + D 600-200 MG-UNIT OR TABS     MULTIPLE VITAMIN OR TABS     No current facility-administered medications for this visit.        PHYSICAL EXAMINATION  /71 (BP Location: Left arm, Patient Position: Chair, Cuff Size: Adult Regular)  Pulse 83  Temp 98.4  F (36.9  C) (Oral)  Resp 16  Ht 1.575 m (5' 2.01\")  Wt 66.9 kg (147 lb 6.4 oz)  SpO2 92%  BMI 26.95 kg/m2  GENERAL: Very pleasant woman, in no acute distress.   HEAD: Normocephalic  EYES: Pupils equal round and reactive to light, no scleral icterus  ENT: Oropharynx clear   HEART: Regular  LUNGS: Clear to auscultation  EXTREMITIES: No pitting edema. Status-post amputation right 3rd finger at DIP joint.  SKIN: Chronic " sun exposure to the skin evident on upper/lower extremities.  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 Gamboa today. She has done well now over 1 year since her wide resection surgery. We again reviewed her PET-CT scan today and this was negative for metastatic or recurrent disease. We will plan to continue with observation visits and scans every 4 months.  She should continue with regular dermatology follow-up as well.    Questions answered.    Светлана Bang M.D.    Hematology, Oncology and Transplantation  [facility]    Metastatic melanoma (H)  - PET Oncology Whole Body            Again, thank you for allowing me to participate in the care of your patient.      Sincerely,    Светлана Vang MD

## 2017-04-21 ENCOUNTER — CARE COORDINATION (OUTPATIENT)
Dept: CARE COORDINATION | Facility: CLINIC | Age: 82
End: 2017-04-21

## 2017-04-21 NOTE — PROGRESS NOTES
Clinic Care Coordination Contact  OUTREACH    Referral Information:  Referral Source: Specialist (Neurology)  Reason for Contact: Social Work follow up--[facility] calls and speaks with pt's daughterArelis.  NANY on file.  Pt lives with dementia.  Care Conference: No     Universal Utilization:   ED Visits in last year: 0  Hospital visits in last year: 0  Last PCP appointment: 12/06/16  Missed Appointments: 0  Concerns: yes  Multiple Providers or Specialists: yes    Clinical Concerns:  Current Medical Concerns: No new reported today    Current Behavioral Concerns: No new report today  Education Provided to patient: [facility] encouraged Arelis to reach out for assistance as needed.     Clinical Pathway: None    Medication Management:  Per Arelis, pt is taking as prescribed.  Family sets up medications     Functional Status:  Mobility Status: Independent  Equipment Currently Used at Home: none  Transportation: Family provides all rides      Psychosocial:  Current living arrangement:: I live alone ([address] Apartment)  Financial/Insurance: Social Security/ [employer] Medicare     Resources and Interventions:  Current Resources:      Advanced Care Plans/Directives on file:: No  Referrals Placed: [employer]     Goals:   Goal 1 Statement: I want to ensure that my mom is safe in her home for as long as possible  Goal 1 Progression Percent: 50%  Goal 1 Progression Date: 04/21/17  Barriers: None reported  Strengths: Pt has great family support.  Patient/Caregiver understanding: Pt's family to continue to provide necessary cares as needed to the pt at this time, such as medication set up & transportation.  Frequency of Care Coordination: Q 6-8 weeks        Plan: Per conversation with pt's daughter, Arelis, it was decided that [facility] will contact Arelis every 6-8 weeks for follow up.    Sushma Riley  Social Work Care Coordinator  [facility]  [phone]

## 2017-04-24 NOTE — PROGRESS NOTES
MEDICAL ONCOLOGY PROGRESS NOTE  Apr 7, 2017    Oncology History:  1. 11/13/2015, she undergoes wide resection of a cutaneous, verrucous melanoma lesion involving the distal portion of the right third finger. Pathology showed evidence of invasive melanoma extending to deep and radial margins.  2. 12/1/2015, she undergoes nail avulsion and wide excision of the lesion with Dr. Keyes. Pathology showed an invasive melanoma consisting of spindled to focally epithelioid cells with prominent nucleoli and junctional nesting. Tumor cell proliferation was noted throughout the dermis to the base of the excision.Eusebio level IV, depth at least 1.86 mm. No angiolymphatic invasion seen.  3. 1/20/2016, she is referred to Dr. Rollins, and undergoes right distal middle finger amputation and right axillary lymph node biopsy. Pathology shows resected distal finger specimen without evidence of residual melanoma. The sentinel node showed 5 to 10% involvement, with multifocal subcapsular deposits of atypical epithelioid tumor cells seen. Mitotic figures seen. Tumor cells were positive for Melan A, tyrosinase, and HMB45.  4. 4/20/2016, PET-CT without evidence for metastatic disease  5. 8/31/2016, PET-CT negative for metastatic disease  6. 3/29/2017, PET-CT negative for metastatic disease.    HISTORY OF PRESENT ILLNESS  Mrs. Gamboa is a very pleasant 83 year old woman with resected stage IIIa cutaneous melanoma. She returns in follow-up. She continues to do well. She has not had any signs or symptoms suggestive of local recurrence. She has also noted no new or changing skin lesions. She follows with dermatology at [facility]. She is now living in a memory unit for assistance with daily care. She denies new lumps/bumps. No     IMAGING: I reviewed the PET-CT with Mrs. Gamboa and her daughter. This again shows no abnormal uptake to suggest metastatic disease.    REVIEW OF SYMPTOMS  A 12-point ROS negative except as in HPI    Past Medical  "History:   Diagnosis Date     Basal cell carcinoma      Malignant melanoma (H)      Past Surgical History:   Procedure Laterality Date     AMPUTATE FINGER(S) Right 1/20/2016    Procedure: AMPUTATE FINGER(S);  Surgeon: Brandon Rollins MD;  Location: [facility]     BIOPSY NODE SENTINEL N/A 1/20/2016    Procedure: BIOPSY NODE SENTINEL;  Surgeon: Brandon Rollins MD;  Location: [facility] OR     EYE SURGERY  7/2009    right cataract     EYE SURGERY  2010    both eyes     RELEASE CARPAL TUNNEL Right 5/12/2015    Procedure: RELEASE CARPAL TUNNEL;  Surgeon: Omar Balderrama MD;  Location: [facility] OR     Current Outpatient Prescriptions   Medication     oxybutynin (DITROPAN XL) 10 MG 24 hr tablet     levothyroxine (SYNTHROID) 75 MCG tablet     raloxifene (EVISTA) 60 MG tablet     simvastatin (ZOCOR) 10 MG tablet     cetirizine (ZYRTEC) 10 MG tablet     Ranitidine HCl (RANITIDINE 75 PO)     Naproxen Sodium (ALEVE) 220 MG capsule     cyanocolbalamin (VITAMIN B-12) 1000 MCG tablet     ASPIRIN 81 MG OR TABS     CALCIUM 600 + D 600-200 MG-UNIT OR TABS     MULTIPLE VITAMIN OR TABS     No current facility-administered medications for this visit.        PHYSICAL EXAMINATION  /71 (BP Location: Left arm, Patient Position: Chair, Cuff Size: Adult Regular)  Pulse 83  Temp 98.4  F (36.9  C) (Oral)  Resp 16  Ht 1.575 m (5' 2.01\")  Wt 66.9 kg (147 lb 6.4 oz)  SpO2 92%  BMI 26.95 kg/m2  GENERAL: Very pleasant woman, in no acute distress.   HEAD: Normocephalic  EYES: Pupils equal round and reactive to light, no scleral icterus  ENT: Oropharynx clear   HEART: Regular  LUNGS: Clear to auscultation  EXTREMITIES: No pitting edema. Status-post amputation right 3rd finger at DIP joint.  SKIN: Chronic sun exposure to the skin evident on upper/lower extremities.  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 Gamboa today. She has done well now " over 1 year since her wide resection surgery. We again reviewed her PET-CT scan today and this was negative for metastatic or recurrent disease. We will plan to continue with observation visits and scans every 4 months.  She should continue with regular dermatology follow-up as well.    Questions answered.    Светлана Bang M.D.    Hematology, Oncology and Transplantation  [facility]    Metastatic melanoma (H)  - PET Oncology Whole Body

## 2017-05-02 ENCOUNTER — OFFICE VISIT (OUTPATIENT)
Dept: FAMILY MEDICINE | Facility: CLINIC | Age: 82
End: 2017-05-02
Payer: COMMERCIAL

## 2017-05-02 VITALS
SYSTOLIC BLOOD PRESSURE: 97 MMHG | TEMPERATURE: 98.2 F | WEIGHT: 149.4 LBS | HEIGHT: 62 IN | BODY MASS INDEX: 27.49 KG/M2 | DIASTOLIC BLOOD PRESSURE: 56 MMHG | HEART RATE: 79 BPM

## 2017-05-02 DIAGNOSIS — C43.61: ICD-10-CM

## 2017-05-02 DIAGNOSIS — F02.80 ALZHEIMER'S DEMENTIA WITHOUT BEHAVIORAL DISTURBANCE, UNSPECIFIED TIMING OF DEMENTIA ONSET: Primary | ICD-10-CM

## 2017-05-02 DIAGNOSIS — E03.9 HYPOTHYROIDISM, UNSPECIFIED TYPE: ICD-10-CM

## 2017-05-02 DIAGNOSIS — G30.9 ALZHEIMER'S DEMENTIA WITHOUT BEHAVIORAL DISTURBANCE, UNSPECIFIED TIMING OF DEMENTIA ONSET: Primary | ICD-10-CM

## 2017-05-02 DIAGNOSIS — M81.0 OSTEOPOROSIS: ICD-10-CM

## 2017-05-02 DIAGNOSIS — E78.5 HYPERLIPIDEMIA LDL GOAL <130: ICD-10-CM

## 2017-05-02 DIAGNOSIS — R35.0 URINARY FREQUENCY: ICD-10-CM

## 2017-05-02 DIAGNOSIS — R32 UNSPECIFIED URINARY INCONTINENCE: ICD-10-CM

## 2017-05-02 LAB
ANION GAP SERPL CALCULATED.3IONS-SCNC: 8 MMOL/L (ref 3–14)
BUN SERPL-MCNC: 22 MG/DL (ref 7–30)
CALCIUM SERPL-MCNC: 9.1 MG/DL (ref 8.5–10.1)
CHLORIDE SERPL-SCNC: 104 MMOL/L (ref 94–109)
CHOLEST SERPL-MCNC: 179 MG/DL
CO2 SERPL-SCNC: 29 MMOL/L (ref 20–32)
CREAT SERPL-MCNC: 0.86 MG/DL (ref 0.52–1.04)
GFR SERPL CREATININE-BSD FRML MDRD: 63 ML/MIN/1.7M2
GLUCOSE SERPL-MCNC: 94 MG/DL (ref 70–99)
HDLC SERPL-MCNC: 61 MG/DL
LDLC SERPL CALC-MCNC: 89 MG/DL
NONHDLC SERPL-MCNC: 118 MG/DL
POTASSIUM SERPL-SCNC: 4.2 MMOL/L (ref 3.4–5.3)
SODIUM SERPL-SCNC: 141 MMOL/L (ref 133–144)
T4 FREE SERPL-MCNC: 0.97 NG/DL (ref 0.76–1.46)
TRIGL SERPL-MCNC: 146 MG/DL
TSH SERPL DL<=0.05 MIU/L-ACNC: 3.15 MU/L (ref 0.4–4)

## 2017-05-02 PROCEDURE — 99214 OFFICE O/P EST MOD 30 MIN: CPT | Performed by: FAMILY MEDICINE

## 2017-05-02 PROCEDURE — 36415 COLL VENOUS BLD VENIPUNCTURE: CPT | Performed by: FAMILY MEDICINE

## 2017-05-02 PROCEDURE — 80048 BASIC METABOLIC PNL TOTAL CA: CPT | Performed by: FAMILY MEDICINE

## 2017-05-02 PROCEDURE — 80061 LIPID PANEL: CPT | Performed by: FAMILY MEDICINE

## 2017-05-02 PROCEDURE — 84439 ASSAY OF FREE THYROXINE: CPT | Performed by: FAMILY MEDICINE

## 2017-05-02 PROCEDURE — 84443 ASSAY THYROID STIM HORMONE: CPT | Performed by: FAMILY MEDICINE

## 2017-05-02 RX ORDER — OXYBUTYNIN CHLORIDE 10 MG/1
TABLET, EXTENDED RELEASE ORAL
Qty: 90 TABLET | Refills: 3 | Status: SHIPPED | OUTPATIENT
Start: 2017-05-02 | End: 2017-09-25

## 2017-05-02 RX ORDER — SIMVASTATIN 10 MG
10 TABLET ORAL AT BEDTIME
Qty: 90 TABLET | Refills: 3 | Status: SHIPPED | OUTPATIENT
Start: 2017-05-02 | End: 2018-01-01

## 2017-05-02 RX ORDER — IBUPROFEN 800 MG/1
800 TABLET, FILM COATED ORAL EVERY 8 HOURS PRN
COMMUNITY
End: 2018-01-01

## 2017-05-02 RX ORDER — RALOXIFENE HYDROCHLORIDE 60 MG/1
1 TABLET, FILM COATED ORAL DAILY
Qty: 90 TABLET | Refills: 3 | Status: SHIPPED | OUTPATIENT
Start: 2017-05-02 | End: 2018-01-01

## 2017-05-02 RX ORDER — LEVOTHYROXINE SODIUM 75 UG/1
75 TABLET ORAL DAILY
Qty: 90 TABLET | Refills: 3 | Status: SHIPPED | OUTPATIENT
Start: 2017-05-02 | End: 2018-01-01

## 2017-05-02 NOTE — PROGRESS NOTES
SUBJECTIVE:                                                    Rhianna Gamboa is a 83 year old female who presents to clinic today for the following health issues:    Chief Complaint   Patient presents with     Thyroid Disease     Refill meds     Lipids     refill meds     Bladder Problems     refill meds       She has dementia.  Daughter checks on her and is setting up her meds.  She wants to go to once a day schedule.  Has been missing tablets.      She cooks in the microwave.  No stove top or oven cooking.  She sometimes take time to get out of bed. Waits to get dressed sometimes to later in the day.      No side effect of meds.  Has a support person to come in to do some light cleaning and laundry.      Hyperlipidemia Follow-Up      Rate your low fat/cholesterol diet?: generally eats pretty healthy    Taking statin?  Yes, no muscle aches from statin    Other lipid medications/supplements?:  none     Hypothyroidism Follow-up      Since last visit, patient describes the following symptoms: Weight stable, no hair loss, no skin changes, no constipation, no loose stools       Amount of exercise or physical activity: does some exercising a couple times per week with assistance.    Problems taking medications regularly: Yes,  problems remembering to take, family is trying to help her with calling or stopping over to make sure she is taking her meds    Medication side effects: questioning if the oxybutynin is also affecting her memory     Diet: eats well, family helps with meals, making sure she is eating.          Problem list and histories reviewed & adjusted, as indicated.  Additional history: as documented        Reviewed and updated as needed this visit by clinical staff       Reviewed and updated as needed this visit by Provider         ROS:  CONSTITUTIONAL:NEGATIVE for fever, chills, change in weight  INTEGUMENTARY/SKIN: seeing her specialist due to cancer of the skin  RESP:NEGATIVE for significant cough or  "SOB  CV: NEGATIVE for chest pain, palpitations or peripheral edema  GI: NEGATIVE for nausea, abdominal pain, heartburn, or change in bowel habits  MUSCULOSKELETAL: NEGATIVE for significant arthralgias or myalgia  NEURO: stable dementia and getting support from family  PSYCHIATRIC: NEGATIVE for changes in mood or affect    OBJECTIVE:                                                    BP 97/56 (BP Location: Right arm, Patient Position: Chair, Cuff Size: Adult Regular)  Pulse 79  Temp 98.2  F (36.8  C) (Tympanic)  Ht 5' 2\" (1.575 m)  Wt 149 lb 6.4 oz (67.8 kg)  BMI 27.33 kg/m2  Body mass index is 27.33 kg/(m^2).  GENERAL APPEARANCE: healthy, alert and no distress  RESP: lungs clear to auscultation - no rales, rhonchi or wheezes  CV: regular rates and rhythm, normal S1 S2, no S3 or S4 and no murmur, click or rub  ABDOMEN: soft, nontender, without hepatosplenomegaly or masses and bowel sounds normal  MS: extremities normal- no gross deformities noted  SKIN: no suspicious lesions or rashes  NEURO: A and O by 2.  PSYCH: affect normal/bright         ASSESSMENT/PLAN:                                                    1. Alzheimer's dementia without behavioral disturbance, unspecified timing of dementia onset  Stable, follow up in 6 months, she is getting support, no safely issues identified today    2. Malignant melanoma of finger of right hand (H)  Seeing her specialist    3. Unspecified urinary incontinence  On med  - oxybutynin (DITROPAN XL) 10 MG 24 hr tablet; Take one by mouth daily.  Dispense: 90 tablet; Refill: 3    4. Urinary frequency  On med  - oxybutynin (DITROPAN XL) 10 MG 24 hr tablet; Take one by mouth daily.  Dispense: 90 tablet; Refill: 3    5. Hypothyroidism, unspecified type  Check today and recheck in 2 months due to being inconsistent  - levothyroxine (SYNTHROID) 75 MCG tablet; Take 1 tablet (75 mcg) by mouth daily  Dispense: 90 tablet; Refill: 3  - TSH  - T4 FREE  - TSH; Future  - T4 FREE; " Future    6. Osteoporosis  On med  - raloxifene (EVISTA) 60 MG tablet; Take 1 tablet (60 mg) by mouth daily Hold on file until needed  Dispense: 90 tablet; Refill: 3    7. Hyperlipidemia LDL goal <130    - simvastatin (ZOCOR) 10 MG tablet; Take 1 tablet (10 mg) by mouth At Bedtime  Dispense: 90 tablet; Refill: 3  - Basic metabolic panel  - Lipid panel reflex to direct LDL      See Patient Instructions    Omar Olivo MD  [facility]

## 2017-05-02 NOTE — NURSING NOTE
"Chief Complaint   Patient presents with     Thyroid Disease     Refill meds     Lipids     refill meds     Bladder Problems     refill meds       Initial BP 97/56 (BP Location: Right arm, Patient Position: Chair, Cuff Size: Adult Regular)  Pulse 79  Temp 98.2  F (36.8  C) (Tympanic)  Ht 5' 2\" (1.575 m)  Wt 149 lb 6.4 oz (67.8 kg)  BMI 27.33 kg/m2 Estimated body mass index is 27.33 kg/(m^2) as calculated from the following:    Height as of this encounter: 5' 2\" (1.575 m).    Weight as of this encounter: 149 lb 6.4 oz (67.8 kg).  Medication Reconciliation: complete  "

## 2017-05-02 NOTE — LETTER
[facility]  [address] [place] [address]  Phone: [phone]    May 2, 2017    Rhianna Gamboa  [address]     [place] [address]          Dear Ms. Gamboa,    The results of your recent lab tests were within normal limits-Normal cholesterol.   Normal kidney function. Normal thyroid function. All good results. . Enclosed is a copy of these results.  If you have any further questions or problems, please contact our office.    Sincerely,      Omar Olivo MD / cb

## 2017-05-02 NOTE — PATIENT INSTRUCTIONS
Please go to lab.    I refilled your medications.    Follow up in 6 months.    For the thyroid I will get a baseline today and then recheck it in 2 months.  This will be a lab visit only.      Thank you for choosing [facility].  You may be receiving a survey in the mail from Alex Avendaño regarding your visit today.  Please take a few minutes to complete and return the survey to let us know how we are doing.      If you have questions or concerns, please contact us via OrderWithMe or you can contact your care team at [phone].    Our Clinic hours are:  Monday 6:40 am  to 7:00 pm  Tuesday -Friday 6:40 am to 5:00 pm    The [place] outpatient lab hours are:  Monday - Friday 6:10 am to 4:45 pm  Saturdays 7:00 am to 11:00 am  Appointments are required, call [phone]    If you have clinical questions after hours or would like to schedule an appointment,  call the clinic at [phone].

## 2017-05-15 ENCOUNTER — OFFICE VISIT (OUTPATIENT)
Dept: DERMATOLOGY | Facility: CLINIC | Age: 82
End: 2017-05-15
Payer: COMMERCIAL

## 2017-05-15 VITALS
HEART RATE: 82 BPM | SYSTOLIC BLOOD PRESSURE: 115 MMHG | DIASTOLIC BLOOD PRESSURE: 65 MMHG | OXYGEN SATURATION: 96 % | RESPIRATION RATE: 18 BRPM

## 2017-05-15 DIAGNOSIS — L81.4 LENTIGO: ICD-10-CM

## 2017-05-15 DIAGNOSIS — L57.0 AK (ACTINIC KERATOSIS): ICD-10-CM

## 2017-05-15 DIAGNOSIS — Z85.820 HISTORY OF MELANOMA: ICD-10-CM

## 2017-05-15 DIAGNOSIS — Z85.828 HISTORY OF SKIN CANCER: Primary | ICD-10-CM

## 2017-05-15 DIAGNOSIS — L82.1 SK (SEBORRHEIC KERATOSIS): ICD-10-CM

## 2017-05-15 PROCEDURE — 99213 OFFICE O/P EST LOW 20 MIN: CPT | Mod: 25 | Performed by: DERMATOLOGY

## 2017-05-15 PROCEDURE — 17000 DESTRUCT PREMALG LESION: CPT | Performed by: DERMATOLOGY

## 2017-05-15 NOTE — PROGRESS NOTES
Rhianna Gamboa is a 83 year old year old female patient here today for f/u hx of non-melanoma skin cancer and melanoma, today she notes spot on right brow.   .  Patient states this has been present for a while .  Patient reports the following symptoms:  scale.  Patient reports the following previous treatments none.  Patient reports the following modifying factors none.  Associated symptoms: none.  Patient has no other skin complaints today.  Remainder of the HPI, Meds, PMH, Allergies, FH, and SH was reviewed in chart.    Pertinent Hx:   Non-melanoma skin cancer   Past Medical History:   Diagnosis Date     Basal cell carcinoma      Malignant melanoma (H)        Past Surgical History:   Procedure Laterality Date     AMPUTATE FINGER(S) Right 1/20/2016    Procedure: AMPUTATE FINGER(S);  Surgeon: Brandon Rollins MD;  Location: [facility] OR     BIOPSY NODE SENTINEL N/A 1/20/2016    Procedure: BIOPSY NODE SENTINEL;  Surgeon: Brandon Rollins MD;  Location: [facility] OR     EYE SURGERY  7/2009    right cataract     EYE SURGERY  2010    both eyes     RELEASE CARPAL TUNNEL Right 5/12/2015    Procedure: RELEASE CARPAL TUNNEL;  Surgeon: Omar Balderrama MD;  Location: [facility] OR        Family History   Problem Relation Age of Onset     C.A.D. Mother      C.A.D. Father      CANCER Maternal Grandmother      bowel cancer     C.A.D. Maternal Grandfather      DIABETES Sister      CEREBROVASCULAR DISEASE Sister      HEART DISEASE Sister      HEART DISEASE Sister      HEART DISEASE Son      Breast Cancer Daughter        Social History     Social History     Marital status:      Spouse name: N/A     Number of children: N/A     Years of education: N/A     Occupational History     Not on file.     Social History Main Topics     Smoking status: Never Smoker     Smokeless tobacco: Never Used     Alcohol use No     Drug use: No     Sexual activity: Not Currently     Other Topics Concern     Parent/Sibling W/ Cabg, Mi Or Angioplasty Before 65f 55m?  Yes     son MI in his 50s     Social History Narrative       Outpatient Encounter Prescriptions as of 5/15/2017   Medication Sig Dispense Refill     ibuprofen (ADVIL/MOTRIN) 800 MG tablet Take 800 mg by mouth every 8 hours as needed for moderate pain       Pyridoxine HCl (VITAMIN B6 PO) Take 100 mg by mouth daily       oxybutynin (DITROPAN XL) 10 MG 24 hr tablet Take one by mouth daily. 90 tablet 3     levothyroxine (SYNTHROID) 75 MCG tablet Take 1 tablet (75 mcg) by mouth daily 90 tablet 3     raloxifene (EVISTA) 60 MG tablet Take 1 tablet (60 mg) by mouth daily Hold on file until needed 90 tablet 3     simvastatin (ZOCOR) 10 MG tablet Take 1 tablet (10 mg) by mouth At Bedtime 90 tablet 3     cetirizine (ZYRTEC) 10 MG tablet Take 1 tablet (10 mg) by mouth daily 90 tablet 3     Ranitidine HCl (RANITIDINE 75 PO) Take 1 tablet by mouth daily Reported on 5/2/2017       Naproxen Sodium (ALEVE) 220 MG capsule Take 220 mg by mouth 2 times daily (with meals). PRN       cyanocolbalamin (VITAMIN B-12) 1000 MCG tablet Take 1,000 mcg by mouth daily Reported on 5/2/2017 100 tablet 3     ASPIRIN 81 MG OR TABS 1 TABLET BY MOUTH DAILY 100 3     CALCIUM 600 + D 600-200 MG-UNIT OR TABS Reported on 5/2/2017 3 MONTHS 1 YEAR     MULTIPLE VITAMIN OR TABS Reported on 5/2/2017  0     No facility-administered encounter medications on file as of 5/15/2017.              Review Of Systems  Skin: As above  Eyes: negative  Ears/Nose/Throat: negative  Respiratory: No shortness of breath, dyspnea on exertion, cough, or hemoptysis  Cardiovascular: negative  Gastrointestinal: negative  Genitourinary: negative  Musculoskeletal: negative  Neurologic: negative  Psychiatric: negative  Hematologic/Lymphatic/Immunologic: negative  Endocrine: negative      O:   NAD, WDWN, Alert & Oriented, Mood & Affect wnl, Vitals stable   Here today alone   /65 (BP Location: Right arm, Patient Position: Chair, Cuff Size: Adult Regular)  Pulse 82  Resp 18  SpO2  96%   General appearance normal   Vitals stable   Alert, oriented and in no acute distress      Following lymph nodes palpated: Occipital, Cervical, Supraclavicular no lad   Stuck on papules and brown macules on trunk and ext    R brow gritty papule        The remainder of the full exam was unremarkable; the following areas were examined:  conjunctiva/lids, oral mucosa, neck, peripheral vascular system, abdomen, lymph nodes, digits/nails, eccrine and apocrine glands, scalp/hair, face, neck, chest, abdomen, buttocks, back, RUE, LUE, RLE, LLE       Eyes: Conjunctivae/lids:Normal     ENT: Lips, buccal mucosa, tongue: normal    MSK:Normal    Cardiovascular: peripheral edema none    Pulm: Breathing Normal    Lymph Nodes: No Head and Neck Lymphadenopathy     Neuro/Psych: Orientation:Normal; Mood/Affect:Normal      A/P:  1. Actinic keratosis R brow  LN2:  Treated with LN2 for 5s for 1-2 cycles. Warned risks of blistering, pain, pigment change, scarring, and incomplete resolution.  Advised patient to return if lesions do not completely resolve.  Wound care sheet given.  2. Seborrheic keratosis, lentigo, hx of non-melanoma skin cancer, hx of melanoma  MELANOMA DISCUSSED WITH PATIENT:  I discussed the specifics of tumor, prognosis, metachronous melanoma, self exam, and genetics with the patient. I explained the need for monthly skin exams including and taught the patient how to do this. Patient was asked about new or changing moles and a full skin exam was performed.   BENIGN LESIONS DISCUSSED WITH PATIENT:  I discussed the specifics of tumor, prognosis, and genetics of benign lesions.  I explained that treatment of these lesions would be purely cosmetic and not medically neccessary.  I discussed with patient different removal options including excision, cautery and /or laser.      Nature and genetics of benign skin lesions dicussed with patient.  Signs and Symptoms of skin cancer discussed with patient.  ABCDEs of melanoma  reviewed with patient.  Patient encouraged to perform monthly skin exams.  UV precautions reviewed with patient.  Skin care regimen reviewed with patient: Eliminate harsh soaps, i.e. Dial, zest, irsih spring; Mild soaps such as Cetaphil or Dove sensitive skin, avoid hot or cold showers, aggressive use of emollients including vanicream, cetaphil or cerave discussed with patient.    Risks of non-melanoma skin cancer discussed with patient   Return to clinic 12 months

## 2017-05-15 NOTE — PATIENT INSTRUCTIONS
WOUND CARE INSTRUCTIONS   FOR CRYOSURGERY   eyebrow  This area treated with liquid nitrogen will form a blister. You do not need to bandage the area until after the blister forms and breaks (which may be a few days). When the blister breaks, begin daily dressing changes as follows:   1) Clean and dry the area with tap water using clean Q-tip or sterile gauze pad.   2) Apply Polysporin ointment or Bacitracin ointment over entire wound. Do NOT use Neosporin ointment.   3) Cover the wound with a band-aid or sterile non-stick gauze pad and micropore paper tape.   REPEAT THESE INSTRUCTIONS AT LEAST ONCE A DAY UNTIL THE WOUND HAS COMPLETELY HEALED.   It is an old wives tale that a wound heals better when it is exposed to air and allowed to dry out. The wound will heal faster with a better cosmetic result if it is kept moist with ointment and covered with a bandage.   Do not let the wound dry out.   IMPORTANT INFORMATION ON REVERSE SIDE   Supplies Needed:   *Cotton tipped applicators (Q-tips)   *Polysporin ointment or Bacitracin ointment (NOT NEOSPORIN)   *Band-aids, or non stick gauze pads and micropore paper tape   PATIENT INFORMATION   During the healing process you will notice a number of changes. All wounds develop a small halo of redness surrounding the wound. This means healing is occurring. Severe itching with extensive redness usually indicates sensitivity to the ointment or bandage tape used to dress the wound. You should call our office if this develops.   Swelling and/or discoloration around your surgical site is common, particularly when performed around the eye.   All wounds normally drain. The larger the wound the more drainage there will be. After 7-10 days, you will notice the wound beginning to shrink and new skin will begin to grow. The wound is healed when you can see skin has formed over the entire area. A healed wound has a healthy, shiny look to the surface and is red to dark pink in color to  normalize. Wounds may take approximately 4-6 weeks to heal. Larger wounds may take 6-8 weeks. After the wound is healed you may discontinue dressing changes.   You may experience a sensation of tightness as your wound heals. This is normal and will gradually subside.   Your healed wound may be sensitive to temperature changes. This sensitivity improves with time, but if you re having a lot of discomfort, try to avoid temperature extremes.   Patients frequently experience itching after their wound appears to have healed because of the continue healing under the skin. Plain Vaseline will help relieve the itching.

## 2017-05-15 NOTE — NURSING NOTE
"Chief Complaint   Patient presents with     Derm Problem     skin check       Initial /65 (BP Location: Right arm, Patient Position: Chair, Cuff Size: Adult Regular)  Pulse 82  Resp 18  SpO2 96% Estimated body mass index is 27.33 kg/(m^2) as calculated from the following:    Height as of 5/2/17: 5' 2\" (1.575 m).    Weight as of 5/2/17: 149 lb 6.4 oz (67.8 kg).  Medication Reconciliation: complete   Lu Hussein LPN    "

## 2017-05-15 NOTE — MR AVS SNAPSHOT
After Visit Summary   5/15/2017    Rhianna Gamboa    MRN: [id number]           Patient Information     Date Of Birth          6/19/1933        Visit Information        Provider Department      5/15/2017 2:00 PM Reji Keyes MD [facility]        Today's Diagnoses     History of skin cancer    -  1    Lentigo        SK (seborrheic keratosis)        AK (actinic keratosis)        History of melanoma          Care Instructions    WOUND CARE INSTRUCTIONS   FOR CRYOSURGERY   eyebrow  This area treated with liquid nitrogen will form a blister. You do not need to bandage the area until after the blister forms and breaks (which may be a few days). When the blister breaks, begin daily dressing changes as follows:   1) Clean and dry the area with tap water using clean Q-tip or sterile gauze pad.   2) Apply Polysporin ointment or Bacitracin ointment over entire wound. Do NOT use Neosporin ointment.   3) Cover the wound with a band-aid or sterile non-stick gauze pad and micropore paper tape.   REPEAT THESE INSTRUCTIONS AT LEAST ONCE A DAY UNTIL THE WOUND HAS COMPLETELY HEALED.   It is an old wives tale that a wound heals better when it is exposed to air and allowed to dry out. The wound will heal faster with a better cosmetic result if it is kept moist with ointment and covered with a bandage.   Do not let the wound dry out.   IMPORTANT INFORMATION ON REVERSE SIDE   Supplies Needed:   *Cotton tipped applicators (Q-tips)   *Polysporin ointment or Bacitracin ointment (NOT NEOSPORIN)   *Band-aids, or non stick gauze pads and micropore paper tape   PATIENT INFORMATION   During the healing process you will notice a number of changes. All wounds develop a small halo of redness surrounding the wound. This means healing is occurring. Severe itching with extensive redness usually indicates sensitivity to the ointment or bandage tape used to dress the wound. You should call our office if this  develops.   Swelling and/or discoloration around your surgical site is common, particularly when performed around the eye.   All wounds normally drain. The larger the wound the more drainage there will be. After 7-10 days, you will notice the wound beginning to shrink and new skin will begin to grow. The wound is healed when you can see skin has formed over the entire area. A healed wound has a healthy, shiny look to the surface and is red to dark pink in color to normalize. Wounds may take approximately 4-6 weeks to heal. Larger wounds may take 6-8 weeks. After the wound is healed you may discontinue dressing changes.   You may experience a sensation of tightness as your wound heals. This is normal and will gradually subside.   Your healed wound may be sensitive to temperature changes. This sensitivity improves with time, but if you re having a lot of discomfort, try to avoid temperature extremes.   Patients frequently experience itching after their wound appears to have healed because of the continue healing under the skin. Plain Vaseline will help relieve the itching.             Follow-ups after your visit        Your next 10 appointments already scheduled     Oct 04, 2017  8:30 AM CDT   PET ONCOLOGY WHOLE BODY with [id number]   [facility] Pet CT ([facility])    [address] [facility] [address] [address] [phone]           Tell your doctor:   If there is any chance you may be pregnant or if you are breastfeeding.   If you have problems lying in small spaces (claustrophobia). If you do, your doctor may give you medicine to help you relax. If you have diabetes:   Have your exam early in the morning. Your blood glucose will go up as the day goes by.   Your glucose level must be 180 or less at the start of the exam. Please take any medicines you need to ensure this blood glucose level. 24 hours before your scan: Don t do any heavy exercise. (No jogging, aerobics or other workouts.)  "Exercise will make your pictures less accurate. 6 hours before your scan:   Stop all food and liquids (except water).   Do not chew gum or suck on mints.   If you need to take medicine with food, you may take it with a few crackers.  Please call your Imaging Department at your exam site with any questions.            Oct 09, 2017  3:00 PM CDT   (Arrive by 2:45 PM)   Return Visit with Светлана Vang MD   [facility] ([facility])    [address] [place] [address] [phone]              Who to contact     If you have questions or need follow up information about today's clinic visit or your schedule please contact [facility] directly at [phone].  Normal or non-critical lab and imaging results will be communicated to you by MyChart, letter or phone within 4 business days after the clinic has received the results. If you do not hear from us within 7 days, please contact the clinic through MyChart or phone. If you have a critical or abnormal lab result, we will notify you by phone as soon as possible.  Submit refill requests through [employer] or call your pharmacy and they will forward the refill request to us. Please allow 3 business days for your refill to be completed.          Additional Information About Your Visit        [employer] Information     [employer] lets you send messages to your doctor, view your test results, renew your prescriptions, schedule appointments and more. To sign up, go to [email or web address] . Click on \"Log in\" on the left side of the screen, which will take you to the Welcome page. Then click on \"Sign up Now\" on the right side of the page.     You will be asked to enter the access code listed below, as well as some personal information. Please follow the directions to create your username and password.     Your access code is: [email or web address]  Expires: 6/22/2017  6:30 AM     Your access code will "  in 90 days. If you need help or a new code, please call your [place] clinic or [phone].        Care EveryWhere ID     This is your Care EveryWhere ID. This could be used by other organizations to access your [place] medical records  [id number]        Your Vitals Were     Pulse Respirations Pulse Oximetry             82 18 96%          Blood Pressure from Last 3 Encounters:   05/15/17 115/65   17 97/56   17 116/71    Weight from Last 3 Encounters:   17 67.8 kg (149 lb 6.4 oz)   17 66.9 kg (147 lb 6.4 oz)   02/15/17 68.9 kg (152 lb)              We Performed the Following     DESTRUCT PREMALIGNANT LESION, FIRST        Primary Care Provider Office Phone # Fax #    Omar Olivo [phone]       [facility] [address] [facility] [address]        Thank you!     Thank you for choosing [facility]  for your care. Our goal is always to provide you with excellent care. Hearing back from our patients is one way we can continue to improve our services. Please take a few minutes to complete the written survey that you may receive in the mail after your visit with us. Thank you!             Your Updated Medication List - Protect others around you: Learn how to safely use, store and throw away your medicines at www.disposemymeds.org.          This list is accurate as of: 5/15/17  2:24 PM.  Always use your most recent med list.                   Brand Name Dispense Instructions for use    ALEVE 220 MG capsule   Generic drug:  naproxen sodium      Take 220 mg by mouth 2 times daily (with meals). PRN       aspirin 81 MG tablet     100    1 TABLET BY MOUTH DAILY       CALCIUM 600 + D 600-200 MG-UNIT Tabs     3 MONTHS    Reported on 2017       cetirizine 10 MG tablet    zyrTEC    90 tablet    Take 1 tablet (10 mg) by mouth daily       cyanocobalamin 1000 MCG tablet    vitamin  B-12    100 tablet    Take 1,000 mcg by mouth daily Reported  on 5/2/2017       ibuprofen 800 MG tablet    ADVIL/MOTRIN     Take 800 mg by mouth every 8 hours as needed for moderate pain       levothyroxine 75 MCG tablet    SYNTHROID    90 tablet    Take 1 tablet (75 mcg) by mouth daily       Multiple vitamin Tabs      Reported on 5/2/2017       oxybutynin 10 MG 24 hr tablet    DITROPAN XL    90 tablet    Take one by mouth daily.       raloxifene 60 MG tablet    EVISTA    90 tablet    Take 1 tablet (60 mg) by mouth daily Hold on file until needed       RANITIDINE 75 PO      Take 1 tablet by mouth daily Reported on 5/2/2017       simvastatin 10 MG tablet    ZOCOR    90 tablet    Take 1 tablet (10 mg) by mouth At Bedtime       VITAMIN B6 PO      Take 100 mg by mouth daily

## 2017-06-29 ENCOUNTER — APPOINTMENT (OUTPATIENT)
Dept: CT IMAGING | Facility: CLINIC | Age: 82
End: 2017-06-29
Attending: PHYSICIAN ASSISTANT
Payer: COMMERCIAL

## 2017-06-29 ENCOUNTER — HOSPITAL ENCOUNTER (EMERGENCY)
Facility: CLINIC | Age: 82
Discharge: HOME | End: 2017-06-29
Attending: PHYSICIAN ASSISTANT | Admitting: PHYSICIAN ASSISTANT
Payer: COMMERCIAL

## 2017-06-29 VITALS
TEMPERATURE: 97.7 F | OXYGEN SATURATION: 94 % | DIASTOLIC BLOOD PRESSURE: 69 MMHG | RESPIRATION RATE: 16 BRPM | SYSTOLIC BLOOD PRESSURE: 135 MMHG
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
## 2017-06-29 DIAGNOSIS — S00.83XA FOREHEAD CONTUSION, INITIAL ENCOUNTER: ICD-10-CM

## 2017-06-29 PROCEDURE — 99284 EMERGENCY DEPT VISIT MOD MDM: CPT | Performed by: PHYSICIAN ASSISTANT

## 2017-06-29 PROCEDURE — 99284 EMERGENCY DEPT VISIT MOD MDM: CPT | Mod: 25

## 2017-06-29 PROCEDURE — 70450 CT HEAD/BRAIN W/O DYE: CPT

## 2017-06-29 PROCEDURE — 72125 CT NECK SPINE W/O DYE: CPT

## 2017-06-29 ASSESSMENT — ENCOUNTER SYMPTOMS
HEADACHES: 0
SHORTNESS OF BREATH: 0
FACIAL ASYMMETRY: 0
PHOTOPHOBIA: 0
VOMITING: 0
NAUSEA: 0
NUMBNESS: 0
FEVER: 0
CHILLS: 0
TREMORS: 0
COUGH: 0
WEAKNESS: 0
SEIZURES: 0

## 2017-06-29 NOTE — ED AVS SNAPSHOT
[facility] Emergency Department    [address] [address]    Phone:  [phone]    Fax:  [phone]                                       Rhianna Gamboa   MRN: [id number]    Department:  [facility] Emergency Department   Date of Visit:  6/29/2017           After Visit Summary Signature Page     I have received my discharge instructions, and my questions have been answered. I have discussed any challenges I see with this plan with the nurse or doctor.    ..........................................................................................................................................  Patient/Patient Representative Signature      ..........................................................................................................................................  Patient Representative Print Name and Relationship to Patient    ..................................................               ................................................  Date                                            Time    ..........................................................................................................................................  Reviewed by Signature/Title    ...................................................              ..............................................  Date                                                            Time

## 2017-06-29 NOTE — ED AVS SNAPSHOT
[facility] Emergency Department    [address] [address]    Phone:  [phone]    Fax:  [phone]                                       Rhianna Gamboa   MRN: [id number]    Department:  [facility] Emergency Department   Date of Visit:  6/29/2017           Patient Information     Date Of Birth          6/19/1933        Your diagnoses for this visit were:     Fall, initial encounter     Forehead contusion, initial encounter        You were seen by Malu Conner PA-C.      Follow-up Information     Follow up with [facility] Emergency Department.    Specialty:  EMERGENCY MEDICINE    Why:  As needed, If symptoms worsen    Contact information:    [address] [address] [phone]    Additional information:    The medical center is located at   [address] ([address] in [place], [place] north   of [place]).        Discharge Instructions               Discharge References/Attachments     HEAD INJURY, NO WAKE-UP (ADULT) (ENGLISH)      Future Appointments        Provider Department Dept Phone Center    10/4/2017 8:30 AM [facility] PET CT ROOM 1 [facility] Pet [phone] [facility]    10/9/2017 3:00 PM Светлана Vang MD [facility] [phone] [facility]      24 Hour Appointment Hotline       To make an appointment at any [facility], call [phone] ([phone]). If you don't have a family doctor or clinic, we will help you find one. [facility] are conveniently located to serve the needs of you and your family.             Review of your medicines      Our records show that you are taking the medicines listed below. If these are incorrect, please call your family doctor or clinic.        Dose / Directions Last dose taken    ALEVE 220 MG capsule   Dose:  220 mg   Generic drug:  naproxen sodium        Take 220 mg by mouth 2 times daily (with meals). PRN   Refills:   0        aspirin 81 MG tablet   Quantity:  100        1 TABLET BY MOUTH DAILY   Refills:  3        CALCIUM 600 + D 600-200 MG-UNIT Tabs   Quantity:  3 MONTHS        Reported on 5/2/2017   Refills:  1 YEAR        cetirizine 10 MG tablet   Commonly known as:  zyrTEC   Dose:  10 mg   Quantity:  90 tablet        Take 1 tablet (10 mg) by mouth daily   Refills:  3        cyanocobalamin 1000 MCG tablet   Commonly known as:  vitamin  B-12   Dose:  1000 mcg   Quantity:  100 tablet        Take 1,000 mcg by mouth daily Reported on 5/2/2017   Refills:  3        ibuprofen 800 MG tablet   Commonly known as:  ADVIL/MOTRIN   Dose:  800 mg        Take 800 mg by mouth every 8 hours as needed for moderate pain   Refills:  0        levothyroxine 75 MCG tablet   Commonly known as:  SYNTHROID   Dose:  75 mcg   Quantity:  90 tablet        Take 1 tablet (75 mcg) by mouth daily   Refills:  3        Multiple vitamin Tabs        Reported on 5/2/2017   Refills:  0        oxybutynin 10 MG 24 hr tablet   Commonly known as:  DITROPAN XL   Quantity:  90 tablet        Take one by mouth daily.   Refills:  3        raloxifene 60 MG tablet   Commonly known as:  EVISTA   Dose:  1 tablet   Quantity:  90 tablet        Take 1 tablet (60 mg) by mouth daily Hold on file until needed   Refills:  3        RANITIDINE 75 PO   Dose:  1 tablet        Take 1 tablet by mouth daily Reported on 5/2/2017   Refills:  0        simvastatin 10 MG tablet   Commonly known as:  ZOCOR   Dose:  10 mg   Quantity:  90 tablet        Take 1 tablet (10 mg) by mouth At Bedtime   Refills:  3        VITAMIN B6 PO   Dose:  100 mg        Take 100 mg by mouth daily   Refills:  0                Procedures and tests performed during your visit     Cervical spine CT w/o contrast    Head CT w/o contrast      Orders Needing Specimen Collection     None      Pending Results     Date and Time Order Name Status Description    6/29/2017 1627 Cervical spine CT w/o contrast Preliminary      6/29/2017 1627 Head CT w/o contrast Preliminary             Pending Culture Results     No orders found from 6/27/2017 to 6/30/2017.            Pending Results Instructions     If you had any lab results that were not finalized at the time of your Discharge, you can call the ED Lab Result RN at [phone]. You will be contacted by this team for any positive Lab results or changes in treatment. The nurses are available 7 days a week from 10A to 6:30P.  You can leave a message 24 hours per day and they will return your call.        Test Results From Your Hospital Stay        6/29/2017  4:56 PM      Narrative     CT SCAN OF THE HEAD WITHOUT CONTRAST  6/29/2017  4:51 PM     HISTORY: Frontal swelling. Recent fall.    TECHNIQUE: Axial images of the head and coronal reformations without  IV contrast material. Radiation dose for this scan was reduced using  automated exposure control, adjustment of the mA and/or kV according  to patient size, or iterative reconstruction technique.    COMPARISON: 8/12/2009 MRI    FINDINGS: There is generalized atrophy of the brain. There is low  attenuation in the white matter of the cerebral hemispheres consistent  with sequelae of small vessel ischemic disease. There is no evidence  of intracranial hemorrhage, mass, acute infarct or anomaly.     The visualized portions of the sinuses and mastoids appear normal.  There is no evidence of trauma.         Impression     IMPRESSION:   1. No acute abnormality.  2. Atrophy of the brain. White matter changes consistent with sequelae  of small vessel ischemic disease.         6/29/2017  5:00 PM      Narrative     CT CERVICAL SPINE WITHOUT CONTRAST   6/29/2017 4:51 PM     HISTORY: Fall, neck pain     TECHNIQUE: Axial images of the cervical spine were obtained without  intravenous contrast. Multiplanar reformations were performed.  Radiation dose for this scan was reduced using automated exposure  control, adjustment of the mA and/or kV according  "to patient size, or  iterative reconstruction technique.    COMPARISON: Radiographs 3/14/2012    FINDINGS: There is no evidence of fracture. There is multilevel  degenerative disc disease especially at C5-C6 and C6-C7 with  multilevel degenerative facet arthropathy. There is acquired fusion  anteriorly and posteriorly at C3-C4. There is reversal of cervical  lordosis. There is extensive degeneration around the odontoid with  subchondral cyst formation of the base of the odontoid on the right.  Right lateral atlantoaxial joint appears fused.    Spinal canal and paraspinous soft tissues are unremarkable.  Calcifications in the right carotid bifurcation.        Impression     IMPRESSION:  1. No evidence of acute trauma.  2. Degenerative changes as described above.  3. Acquired fusions of C3-C4 anteriorly and posteriorly bilaterally  and of the right lateral atlantoaxial joint.                Thank you for choosing [facility]       Thank you for choosing [facility] for your care. Our goal is always to provide you with excellent care. Hearing back from our patients is one way we can continue to improve our services. Please take a few minutes to complete the written survey that you may receive in the mail after you visit with us. Thank you!        [employer] Information     [employer] lets you send messages to your doctor, view your test results, renew your prescriptions, schedule appointments and more. To sign up, go to [email or web address] . Click on \"Log in\" on the left side of the screen, which will take you to the Welcome page. Then click on \"Sign up Now\" on the right side of the page.     You will be asked to enter the access code listed below, as well as some personal information. Please follow the directions to create your username and password.     Your access code is: [email or web address]  Expires: 2017  5:15 PM     Your access code will  in 90 days. If you need help or a new code, please call your [facility] clinic " or [phone].        Care EveryWhere ID     This is your Care EveryWhere ID. This could be used by other organizations to access your [facility] medical records  [id number]        Equal Access to Services     SARAH DAVIDSON : Mian Shah, waharryda lukalaadaha, qaybta kaalmada raulduyeimi, walter bonilla. So [facility] [phone].    ATENCIÓN: Si habla español, tiene a alexander disposición servicios gratuitos de asistencia lingüística. Llame al [phone].    We comply with applicable federal civil rights laws and [place] laws. We do not discriminate on the basis of race, color, national origin, age, disability sex, sexual orientation or gender identity.            After Visit Summary       This is your record. Keep this with you and show to your community pharmacist(s) and doctor(s) at your next visit.

## 2017-06-29 NOTE — ED PROVIDER NOTES
History     Chief Complaint   Patient presents with     Fall     Pt misjudged a curb, fell and hit forehead on the concrete.  Family unable to catch her, states she made a clunking sound hitting the ground.  Pt denies any neck or back pain. Pt is not on Coumadin.     GENET Gamboa is a 84 year old female who presents to the emergency Department with family with concerns over head injury which occurred just prior to arrival.  Patient states that they were walking and she misjudged a curb and fell forward and hit her head on concrete.  Family stated there was immediately ecchymosis, swelling on the right frontal area.  Patient did not have any loss of consciousness.  Patient states she is asymptomatic however family was concerned and wanted to hear on the side of caution.  She denies any current headache, dizziness, lightheadedness, cough, chest pains, dyspnea, neck pain, nausea, vomiting, photo or phonophobia.  No extremity numbness, weakness.  Family states she has been acting appropriate per her baseline.  Daughter who presents with her also states that she has had a history of two falls last winter.  She was evaluated and was told that she has some weakness in her right hip which results in some gait instability.  She has been followed by physical therapy for this.  She did not have any preceding headache, dizziness or lightheadedness or  no chest pains, palpitations.  Patient does take an 81 mg aspirin daily.  She denies any other blood thinner use.    I have reviewed the Medications, Allergies, Past Medical and Surgical History, and Social History in the Epic system.    Allergies:   Allergies   Allergen Reactions     Codeine      Headache       Detrol [Tolterodine Tartrate] Other (See Comments)     Syncopal episode     Tylenol Itching     Other [Seasonal Allergies] Rash     Linament cream, for aches and pains and broke out where she had put it on her       No current facility-administered medications on  file prior to encounter.   Current Outpatient Prescriptions on File Prior to Encounter:  ibuprofen (ADVIL/MOTRIN) 800 MG tablet Take 800 mg by mouth every 8 hours as needed for moderate pain   Pyridoxine HCl (VITAMIN B6 PO) Take 100 mg by mouth daily   oxybutynin (DITROPAN XL) 10 MG 24 hr tablet Take one by mouth daily.   levothyroxine (SYNTHROID) 75 MCG tablet Take 1 tablet (75 mcg) by mouth daily   raloxifene (EVISTA) 60 MG tablet Take 1 tablet (60 mg) by mouth daily Hold on file until needed   simvastatin (ZOCOR) 10 MG tablet Take 1 tablet (10 mg) by mouth At Bedtime   cetirizine (ZYRTEC) 10 MG tablet Take 1 tablet (10 mg) by mouth daily   Ranitidine HCl (RANITIDINE 75 PO) Take 1 tablet by mouth daily Reported on 5/2/2017   Naproxen Sodium (ALEVE) 220 MG capsule Take 220 mg by mouth 2 times daily (with meals). 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)       Past Surgical History:   Procedure Laterality Date     AMPUTATE FINGER(S) Right 1/20/2016    Procedure: AMPUTATE FINGER(S);  Surgeon: Brandon Rollins MD;  Location:  OR     BIOPSY NODE SENTINEL N/A 1/20/2016    Procedure: BIOPSY NODE SENTINEL;  Surgeon: Brandon Rollins MD;  Location:  OR     EYE SURGERY  7/2009    right cataract     EYE SURGERY  2010    both eyes     RELEASE CARPAL TUNNEL Right 5/12/2015    Procedure: RELEASE CARPAL TUNNEL;  Surgeon: Omar Balderrama MD;  Location: [facility] OR       Social History   Substance Use Topics     Smoking status: Never Smoker      "Smokeless tobacco: Never Used     Alcohol use No       Most Recent Immunizations   Administered Date(s) Administered     Influenza (High Dose) 3 valent vaccine 12/06/2016     Influenza (IIV3) 10/01/2012     Pneumococcal (PCV 13) 08/01/2016     Pneumococcal 23 valent 10/01/2012     TD (ADULT, 7+) 07/28/2010     TDAP Vaccine (Adacel) 04/10/2013     Zoster vaccine, live 11/03/2012       BMI: Estimated body mass index is 27.33 kg/(m^2) as calculated from the following:    Height as of 5/2/17: 1.575 m (5' 2\").    Weight as of 5/2/17: 67.8 kg (149 lb 6.4 oz).    Review of Systems   Constitutional: Negative for chills and fever.   Eyes: Negative for photophobia.   Respiratory: Negative for cough and shortness of breath.    Cardiovascular: Negative for chest pain.   Gastrointestinal: Negative for nausea and vomiting.   Neurological: Negative for tremors, seizures, syncope, facial asymmetry, weakness, numbness and headaches.     Physical Exam   /73  Temp 97.7  F (36.5  C) (Oral)  Resp 16  SpO2 97%  Physical Exam   Constitutional: She is oriented to person, place, and time. She appears well-developed. No distress.   HENT:   Head: Normocephalic and atraumatic.       Right Ear: External ear normal.   Left Ear: External ear normal.   Mouth/Throat: Uvula is midline, oropharynx is clear and moist and mucous membranes are normal. No oropharyngeal exudate.   Tongue protrudes to midline.  Uvula rises with phonation   Eyes: Conjunctivae and EOM are normal. Pupils are equal, round, and reactive to light. Right eye exhibits no discharge. Left eye exhibits no discharge.   Neck: Normal range of motion. Neck supple.   No focal midline cervical tenderness to palpation.  There is good strength against resistance with rotation of the neck to the left and the right and with shoulder shrug   Cardiovascular: Normal rate, regular rhythm and normal heart sounds.  Exam reveals no gallop and no friction rub.    No murmur " heard.  Pulmonary/Chest: Effort normal and breath sounds normal. No respiratory distress. She has no wheezes. She has no rales.   Neurological: She is alert and oriented to person, place, and time. She has normal strength and normal reflexes. No cranial nerve deficit or sensory deficit. GCS eye subscore is 4. GCS verbal subscore is 5. GCS motor subscore is 6.   Reflex Scores:       Bicep reflexes are 2+ on the right side and 2+ on the left side.       Brachioradialis reflexes are 2+ on the right side and 2+ on the left side.       Patellar reflexes are 2+ on the right side and 2+ on the left side.       Achilles reflexes are 2+ on the right side and 2+ on the left side.  Normal finger nose finger testing   Skin: Skin is warm and dry. Ecchymosis noted. No abrasion, no laceration and no rash noted. No erythema.       ED Course     ED Course     Procedures        Critical Care time:  none            Results for orders placed or performed during the hospital encounter of 06/29/17   Head CT w/o contrast    Narrative    CT SCAN OF THE HEAD WITHOUT CONTRAST  6/29/2017  4:51 PM     HISTORY: Frontal swelling. Recent fall.    TECHNIQUE: Axial images of the head and coronal reformations without  IV contrast material. Radiation dose for this scan was reduced using  automated exposure control, adjustment of the mA and/or kV according  to patient size, or iterative reconstruction technique.    COMPARISON: 8/12/2009 MRI    FINDINGS: There is generalized atrophy of the brain. There is low  attenuation in the white matter of the cerebral hemispheres consistent  with sequelae of small vessel ischemic disease. There is no evidence  of intracranial hemorrhage, mass, acute infarct or anomaly.     The visualized portions of the sinuses and mastoids appear normal.  There is no evidence of trauma.       Impression    IMPRESSION:   1. No acute abnormality.  2. Atrophy of the brain. White matter changes consistent with sequelae  of small  vessel ischemic disease.   Cervical spine CT w/o contrast    Narrative    CT CERVICAL SPINE WITHOUT CONTRAST   6/29/2017 4:51 PM     HISTORY: Fall, neck pain     TECHNIQUE: Axial images of the cervical spine were obtained without  intravenous contrast. Multiplanar reformations were performed.  Radiation dose for this scan was reduced using automated exposure  control, adjustment of the mA and/or kV according to patient size, or  iterative reconstruction technique.    COMPARISON: Radiographs 3/14/2012    FINDINGS: There is no evidence of fracture. There is multilevel  degenerative disc disease especially at C5-C6 and C6-C7 with  multilevel degenerative facet arthropathy. There is acquired fusion  anteriorly and posteriorly at C3-C4. There is reversal of cervical  lordosis. There is extensive degeneration around the odontoid with  subchondral cyst formation of the base of the odontoid on the right.  Right lateral atlantoaxial joint appears fused.    Spinal canal and paraspinous soft tissues are unremarkable.  Calcifications in the right carotid bifurcation.      Impression    IMPRESSION:  1. No evidence of acute trauma.  2. Degenerative changes as described above.  3. Acquired fusions of C3-C4 anteriorly and posteriorly bilaterally  and of the right lateral atlantoaxial joint.     Labs Ordered and Resulted from Time of ED Arrival Up to the Time of Departure from the ED - No data to display    Assessments & Plan (with Medical Decision Making)     I have reviewed the nursing notes.    I have reviewed the findings, diagnosis, plan and need for follow up with the patient.       New Prescriptions    No medications on file     Final diagnoses:   Fall, initial encounter   Forehead contusion, initial encounter     84-year-old female presents to the emergency Department with family with concerns of her fall which occurred just prior to arrival when patient misjudged the distance and tripped hitting forehead on concrete.  She  did not have any loss of consciousness.  There was noted sudden onset of ecchymosis, swelling, mild tenderness to palpation on the right forehead.  Remainder of exam including full neurologic exam was within normal limits.  I discussed versus benefits of CT to rule out significant intracranial pathology or neck problem and patient family agreed to proceed with imaging.  CT of the head and neck did show degenerative changes, no evidence of acute trauma, noted acquired fusion of the C3-C4 anteriorly and posteriorly bilaterally and of the right lateral atlantoaxial joint.  Patient family were reassured of findings.  She was discharged in stable with instructions for synthetic treatment as needed with ice, Tylenol.  Return to the ER as needed if new or worsening symptoms develop.    Disclaimer: This note consists of symbols derived from keyboarding, dictation, and/or voice recognition software. As a result, there may be errors in the script that have gone undetected.  Please consider this when interpreting information found in the chart.    6/29/2017   [facility] EMERGENCY DEPARTMENT     Malu Conner PA-C  06/29/17 5217

## 2017-06-30 ENCOUNTER — CARE COORDINATION (OUTPATIENT)
Dept: CARE COORDINATION | Facility: CLINIC | Age: 82
End: 2017-06-30

## 2017-06-30 NOTE — PROGRESS NOTES
"Clinic Care Coordination Contact  OUTREACH    Referral Information:  Referral Source: ED Follow-Up   Reason for Contact: Pt was in the ED last night due to a fall.  Care Conference: No     Universal Utilization:   ED Visits in last year: 1  Hospital visits in last year: 0  Last PCP appointment: 05/02/17  Missed Appointments: 0  Concerns: yes  Multiple Providers or Specialists: yes    Clinical Concerns:  Current Medical Concerns: Per pt, \"not a one.\"    Current Behavioral Concerns: Per pt, \"not a one.\"    Education Provided to patient: SW and pt discussed that if pt began feeling unwell, got a head ache or muscle aches, she should talk to her daughter or apartment staff to consult her PCP or clinic.  Pt states she will.      Clinical Pathway: None    Medication Management:  Pt's daughter, Shaquille, sets up all medications.     Functional Status:  Mobility Status: Independent  Equipment Currently Used at Home: none  Transportation: Pt no longer drives, family/home care staff transport her.      Psychosocial:  Current living arrangement:: I live alone-[address] with home care and staff assistance  Financial/Insurance: Social Security/[employer]     Pt lives alone, but has privately paid staff & family members check in with the pt daily.     Resources and Interventions:  Current Resources: Home Care         Advanced Care Plans/Directives on file:: No  Referrals Placed: Alzheimer's Association     Goals:   Goal 1 Statement: I want to ensure that my mom is safe in her home for as long as possible  Goal 1 Progression Percent: 80%  Goal 1 Progression Date: 06/30/17  Barriers: None   Strengths: Pt has the financial means to privately pay for necessary services  Patient/Caregiver understanding: Pt and family are aware of this writer's contact information should they need further assistance.  Frequency of Care Coordination: Q 6-8 weeks        Plan: SW to continue to follow as needed.    Sushma Riley  Social Work Care " Coordinator  [facility]  [phone]

## 2017-06-30 NOTE — PROGRESS NOTES
Clinic Care Coordination Contact  OUTREACH    Referral Information:  Referral Source: Specialist (Neurology)  Reason for Contact: P  Care Conference: No     Universal Utilization:   ED Visits in last year: 1  Hospital visits in last year: 0  Last PCP appointment: 05/02/17  Missed Appointments: 0  Concerns: yes  Multiple Providers or Specialists: yes    Clinical Concerns:  Current Medical Concerns: ***    Current Behavioral Concerns: ***    Education Provided to patient: ***      Clinical Pathway: {Clinical Pathway:373640}    Medication Management:  ***     Functional Status:  Mobility Status: Independent  Equipment Currently Used at Home: none  Transportation: ***           Psychosocial:  Current living arrangement:: I live alone ([address])  Financial/Insurance: ***  ***     Resources and Interventions:  Current Resources:  ;          Advanced Care Plans/Directives on file:: No  Referrals Placed: Alzheimer's Association     Goals:   Goal 1 Statement: I want to ensure that my mom is safe in her home for as long as possible  Goal 1 Progression Percent: 80%  Goal 1 Progression Date: 06/30/17              Barriers: ***  Strengths: ***  Patient/Caregiver understanding: ***  Frequency of Care Coordination: Q 6-8 weeks        Plan: ***

## 2017-08-11 ENCOUNTER — CARE COORDINATION (OUTPATIENT)
Dept: CARE COORDINATION | Facility: CLINIC | Age: 82
End: 2017-08-11

## 2017-08-11 NOTE — LETTER
[place] CARE COORDINATION  [address]  [place] [address]  [phone]      August 11, 2017      Rhianna Gamboa  [address],    [place] [address]      Dear Rhianna Infante,  I am the Clinic Care Coordinator that works with your primary care provider's clinic. I wanted to thank you for spending the time to talk with me.  Below is a description of what Clinic Care Coordination is and how I can further assist you.     The Clinic Care Coordinator role is a Registered Nurse and/or  who understands the health care system. The goal of Clinic Care Coordination is to help you manage your health and improve access to the [facility] system in the most efficient manner.  The Registered Nurse can assist you in meeting your health care goals by providing education, coordinating services, and strengthening the communication among your providers. The  can assist you with financial, behavioral, psychosocial, chemical dependency, counseling, and/or psychiatric resources.    Please contact me at [phone] with any questions or concerns that may arise. We at [facility] are focused on providing you with the highest-quality healthcare experience possible and that all starts with you.       Sincerely,     Sushma Ramos    Enclosed: I have enclosed a copy of the Complex Care Plan. This has helpful information and goals that we have talked about. Please keep this in an easy to access place to use as needed.

## 2017-08-11 NOTE — PROGRESS NOTES
Clinic Care Coordination Contact  OUTREACH    Referral Information:  Referral Source: ED Follow-Up (Neurology)  Reason for Contact: Social work follow up  Care Conference: No     Universal Utilization:   ED Visits in last year: 1  Hospital visits in last year: 0  Last PCP appointment: 05/02/17  Missed Appointments: 0  Concerns: yes  Multiple Providers or Specialists: yes    Clinical Concerns:  Current Medical Concerns: Per pt's daughter, Arelis, pt appears to be sleeping during the day more often, possibly up at night.  Pt is not doing anything that is harmful, just has a change in sleeping habits  Current Behavioral Concerns: None reported    Education Provided to patient: SW and pt discussed that pt can be seen by PCP to discuss her sleep issue if it bothersome or if pt is doing inappropriate things such as trying to cook, wander, etc.  Arelis said pt is not doing any inappropriate behaviors, just has had a change in her sleep schedules.  [facility] provided Alzheimer's.org web-site and [facility] REACH program, both offer caregiver education & support.     Clinical Pathway: None    Medication Management:  Arelis sets up the pt's medications and only allows a week worth of meds in the home at a time.    Functional Status:  Mobility Status: Independent  Equipment Currently Used at Home: none  Transportation: Pt no longer drives.  Family or caregiver transports      Psychosocial:  Current living arrangement:: I live alone, but pt has caregivers daily + family support  Financial/Insurance: Pt is financially stable, per Arelis, will be able to privately pay for her needs for 10+ years in an assisted living facility if needed/ [employer].    Arelis explained that she is working with the family  to obtain Power of  and be the pt's legal guardian for Health Care decisions due to pt's dementia.  [facility] commended Arelis for proactively working on this project.  Pt is on the wait list for [facility] in [place], an assisted living facility.  Pt had come to the top of the list a month ago, but as pt did not want to leave her apartment, the family decided to pursue legal guardianship and move to the next slot on the wait list.  Pt should hopefully come to the top of the wait list in the next 3-6 months.    Resources and Interventions:  Current Resources:  Private pay caregivers     Advanced Care Plans/Directives on file:: No  Referrals Placed: Alzheimer's Association and [facility] REACH Program     Goals:   Goal 1 Statement: I want to ensure that my mom is safe in her home for as long as possible  Goal 1 Progression Percent: 90%  Goal 1 Progression Date: 08/11/17  Barriers: Pt does not want to move into [facility] at this time which is why Arelis & family are pursuing legal guardianship of the pt for decision making.  Strengths: Pt has very supportive family.  Patient/Caregiver understanding: Arelis is aware that this writer is available to her for any questions regarding her mom's diagnosis or care needs.  Frequency of Care Coordination: Q 6-8 weeks      Plan: SW to continue to follow for clinic care coordination.    Sushma Riley  Social Work Care Coordinator  [facility]  [phone]

## 2017-08-11 NOTE — LETTER
[facility]  Complex Care Plan  About Me  Patient Name:  Rhianna Gamboa    YOB: 1933  Age:   84 year old   [facility] MRN: [id number] Telephone Information:   Home Phone [phone]   Mobile NONE       Address:    [address]     [place] [address] Email address:  No e-mail address on record      Emergency Contact(s)  Name Relationship Lgl Grd Work Phone Home Phone Mobile Phone   1. ARELIS GAMBOA Daughter  none [phone]   2. LEONORA MORALES Relative  none [phone] none           Primary language:  English     needed? No   [facility] Language Services:  [phone] op. 1  Other communication barriers: Yes, wants you to speak with daughterArelis.  Preferred Method of Communication:  Phone  Current living arrangement: I live alone with caregivers   Mobility Status/ Medical Equipment: Independent  Other information to know about me:   My Access Plan  Medical Emergency 911   Primary Clinic Line [facility]- [phone]   24 Hour Appointment Line [phone] or  [phone]) (toll-free)   24 Hour Nurse Line [phone] (toll-free)   Preferred Urgent Care [facility], [phone]   Preferred Hospital [place], [place]  [phone]   Preferred Pharmacy MAIL ORDER - NO PHONE     Behavioral Health Crisis Line The National Suicide Prevention Lifeline at 1-188.547.3447 or 911     My Care Team Members  Patient Care Team       Relationship Specialty Notifications Start End    Omar Olivo MD PCP - General   12/14/06     Phone: [phone] Fax: [phone] [address] [facility] [address]    Светлана Lopez MD MD Hematology & Oncology Abnormal results only, Admissions 2/8/16     Phone: [phone] Fax: [phone] [address] [facility] [address]    Tracie Goode RN Registered Nurse Nurse Admissions 9/8/16     Phone: [phone]  Pager: [phone]        Yadira Portillo MD Referring Physician Neurosurgery  12/15/16     Comment:  referring to neuropsych    Phone: [phone] Fax: [phone]         [address] [facility] [address]    Jacquie Bates, LP   Psychology  12/15/16     Phone: [phone] Fax: [phone]         [address] [facility] [address]    Sushma Ramos   Admissions 2/17/17     Phone: [phone] Fax: [phone]             My Care Plans  Self Management and Treatment Plan  Goals and (Comments)  Goal #1: I want to ensure that my mom is safe in her home for as long as possible      90% of goal reached    Action Plans on File:  none  Advance Care Plans/Directives Type:  None       My Medical and Care Information  Problem List   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.    Care Coordination Start Date: 02/17/17   Frequency of Care Coordination: Q 6-8 weeks   Form Last Updated: 08/11/2017

## 2017-09-25 ENCOUNTER — OFFICE VISIT (OUTPATIENT)
Dept: FAMILY MEDICINE | Facility: CLINIC | Age: 82
End: 2017-09-25
Payer: COMMERCIAL

## 2017-09-25 ENCOUNTER — RADIANT APPOINTMENT (OUTPATIENT)
Dept: GENERAL RADIOLOGY | Facility: CLINIC | Age: 82
End: 2017-09-25
Attending: FAMILY MEDICINE
Payer: COMMERCIAL

## 2017-09-25 VITALS
DIASTOLIC BLOOD PRESSURE: 66 MMHG | HEART RATE: 80 BPM | TEMPERATURE: 98.8 F | HEIGHT: 62 IN | SYSTOLIC BLOOD PRESSURE: 120 MMHG

## 2017-09-25 DIAGNOSIS — Z23 NEED FOR PROPHYLACTIC VACCINATION AND INOCULATION AGAINST INFLUENZA: ICD-10-CM

## 2017-09-25 DIAGNOSIS — M25.552 HIP PAIN, LEFT: ICD-10-CM

## 2017-09-25 DIAGNOSIS — M54.32 SCIATICA OF LEFT SIDE: Primary | ICD-10-CM

## 2017-09-25 DIAGNOSIS — R41.0 CONFUSION: ICD-10-CM

## 2017-09-25 PROCEDURE — 90662 IIV NO PRSV INCREASED AG IM: CPT | Performed by: FAMILY MEDICINE

## 2017-09-25 PROCEDURE — 99214 OFFICE O/P EST MOD 30 MIN: CPT | Mod: 25 | Performed by: FAMILY MEDICINE

## 2017-09-25 PROCEDURE — G0008 ADMIN INFLUENZA VIRUS VAC: HCPCS | Performed by: FAMILY MEDICINE

## 2017-09-25 PROCEDURE — 73502 X-RAY EXAM HIP UNI 2-3 VIEWS: CPT

## 2017-09-25 RX ORDER — HYDROCODONE BITARTRATE AND ACETAMINOPHEN 5; 325 MG/1; MG/1
1 TABLET ORAL EVERY 6 HOURS PRN
Qty: 20 TABLET | Refills: 0 | Status: SHIPPED | OUTPATIENT
Start: 2017-09-25 | End: 2018-01-01

## 2017-09-25 NOTE — NURSING NOTE
"Chief Complaint   Patient presents with     Musculoskeletal Problem     Pain in hips for several weeks     Medication Reconciliation     daughter wants her off the oxybutinin. She is concerned that it is worsening her memory.       Initial /66 (Cuff Size: Adult Regular)  Pulse 80  Temp 98.8  F (37.1  C) (Tympanic)  Ht 5' 2\" (1.575 m) Estimated body mass index is 27.33 kg/(m^2) as calculated from the following:    Height as of 5/2/17: 5' 2\" (1.575 m).    Weight as of 5/2/17: 149 lb 6.4 oz (67.8 kg).  Medication Reconciliation: complete  "

## 2017-09-25 NOTE — PROGRESS NOTES
SUBJECTIVE:   Rhianna Gamboa is a 84 year old female who presents to clinic today for the following health issues:  Chief Complaint   Patient presents with     Musculoskeletal Problem     Pain in hips for several weeks     Medication Reconciliation     daughter wants her off the oxybutinin. She is concerned that it is worsening her memory.       Joint Pain (Hip Pain)    Onset: about 2 weeks    Description:   Location: left hip  Character: can be Sharp with walking and Dull ache most of the time    Intensity: moderate,to severe,     Progression of Symptoms: same    Accompanying Signs & Symptoms:  Other symptoms: none    History:   Previous similar pain: no, has been treated for back pain in the past, use some pain med left over from that    Precipitating factors:   Trauma or overuse: no     Alleviating factors:  Improved by: heat and the pain pill    Therapies Tried and outcome:   She is using a walker.  This seems to help her mobility.  They have not removed all of the scatter rugs.  No known falls     Regarding some confusion, daughter took her off the Ditropan xl due to confusion. This has helped.  They will deal with the urine leakage with pads etc.  Daughter is in charge of medication and also wants to limit and other possible medication side effects.    For the pain has been using a little hydrocodone they had from an earlier trip.  It seems to help with some of the pain.  Also using a little ibuprofen.                Problem list and histories reviewed & adjusted, as indicated.  Additional history: as documented        Reviewed and updated as needed this visit by clinical staff     Reviewed and updated as needed this visit by Provider         ROS:  CONSTITUTIONAL:NEGATIVE for fever, chills, change in weight  INTEGUMENTARY/SKIN: NEGATIVE for worrisome rashes, moles or lesions  RESP:NEGATIVE for significant cough or SOB  CV: NEGATIVE for chest pain, palpitations or peripheral edema  MUSCULOSKELETAL: left hip  "pain  NEURO: resolved confusion, now back to baseline  PSYCHIATRIC: NEGATIVE for changes in mood or affect    OBJECTIVE:                                                    /66 (Cuff Size: Adult Regular)  Pulse 80  Temp 98.8  F (37.1  C) (Tympanic)  Ht 5' 2\" (1.575 m)  There is no height or weight on file to calculate BMI.  GENERAL APPEARANCE: alert, no distress and cooperative  RESP: lungs clear to auscultation - no rales, rhonchi or wheezes  CV: regular rates and rhythm, normal S1 S2, no S3 or S4 and no murmur, click or rub  MS: no pain with moving the left hip with flexion. Right hip was normal too  SKIN: no suspicious lesions or rashes  NEURO: seems to be at baseline  PSYCH: mentation appears normal and affect normal/bright    I have personally reviewed the xray and my interpretation is the following:  Pelvis and left hip, no signs of any fracture.        ASSESSMENT/PLAN:                                                    (M54.32) Sciatica of left side  (primary encounter diagnosis)  Comment: Symptomatic cares were discussed in details.  Use pain med and otc med, explained why I am not using a muscle relaxant  Plan: HYDROcodone-acetaminophen (NORCO) 5-325 MG per         tablet            (M25.552) Hip pain, left  Comment:   Plan: XR Pelvis and Hip Left 2 Views            (Z23) Need for prophylactic vaccination and inoculation against influenza  Comment:   Plan: FLU VACCINE, INCREASED ANTIGEN, PRESV FREE, AGE        65+ [88060], ADMIN INFLUENZA (For MEDICARE         Patients ONLY) []          Resolved confusion, explained also why I am not using any muscle relaxants.      See Patient Instructions    Omar Olivo MD  [facility]    "

## 2017-09-25 NOTE — PATIENT INSTRUCTIONS
I do believe you have having more sciatica based on your physical exam and that your xray of your hip is looking good.    You may use ibuprofen 800 mg every 8 hours as needed however take with a meal, use intermittently.    You could use a little hydrocodone 5/325 taking 1 tab every 6 hours as needed.  I will write for a small supply.    Get a good comfortable chair.    You can use acetaminophen:  Use acetaminophen 500 mg tabs, 1-2 tabs every 6 hours as needed, remember not to use more than 4,000mg in 24 hours and to include other medications that may have acetaminophen.        Thank you for choosing [facility].  You may be receiving a survey in the mail from [facility] regarding your visit today.  Please take a few minutes to complete and return the survey to let us know how we are doing.      If you have questions or concerns, please contact us via [employer] or you can contact your care team at [phone].    Our Clinic hours are:  Monday 6:40 am  to 7:00 pm  Tuesday -Friday 6:40 am to 5:00 pm    The [place] outpatient lab hours are:  Monday - Friday 6:10 am to 4:45 pm  Saturdays 7:00 am to 11:00 am  Appointments are required, call [phone]    If you have clinical questions after hours or would like to schedule an appointment,  call the clinic at [phone].

## 2017-09-25 NOTE — PROGRESS NOTES
Injectable Influenza Immunization Documentation    1.  Is the person to be vaccinated sick today?   No    2. Does the person to be vaccinated have an allergy to a component   of the vaccine?   No    3. Has the person to be vaccinated ever had a serious reaction   to influenza vaccine in the past?   No    4. Has the person to be vaccinated ever had Guillain-Barré syndrome?   No    Form completed by MARIVEL Barnett ([facility])

## 2017-09-25 NOTE — MR AVS SNAPSHOT
After Visit Summary   9/25/2017    Rhianna Gamboa    MRN: [id number]           Patient Information     Date Of Birth          6/19/1933        Visit Information        Provider Department      9/25/2017 12:40 PM Omar Olivo MD [facility]        Today's Diagnoses     Sciatica of left side    -  1    Hip pain, left        Need for prophylactic vaccination and inoculation against influenza          Care Instructions    I do believe you have having more sciatica based on your physical exam and that your xray of your hip is looking good.    You may use ibuprofen 800 mg every 8 hours as needed however take with a meal, use intermittently.    You could use a little hydrocodone 5/325 taking 1 tab every 6 hours as needed.  I will write for a small supply.    Get a good comfortable chair.    You can use acetaminophen:  Use acetaminophen 500 mg tabs, 1-2 tabs every 6 hours as needed, remember not to use more than 4,000mg in 24 hours and to include other medications that may have acetaminophen.        Thank you for choosing [facility].  You may be receiving a survey in the mail from [facility] regarding your visit today.  Please take a few minutes to complete and return the survey to let us know how we are doing.      If you have questions or concerns, please contact us via [employer] or you can contact your care team at [phone].    Our Clinic hours are:  Monday 6:40 am  to 7:00 pm  Tuesday -Friday 6:40 am to 5:00 pm    The [place] outpatient lab hours are:  Monday - Friday 6:10 am to 4:45 pm  Saturdays 7:00 am to 11:00 am  Appointments are required, call [phone]    If you have clinical questions after hours or would like to schedule an appointment,  call the clinic at [phone].            Follow-ups after your visit        Your next 10 appointments already scheduled     Oct 04, 2017  8:30 AM CDT   PET ONCOLOGY WHOLE BODY with [id number]   [facility] ([place]  [facility])    [address] [address]   [phone]           Tell your doctor:   If there is any chance you may be pregnant or if you are breastfeeding.   If you have problems lying in small spaces (claustrophobia). If you do, your doctor may give you medicine to help you relax. If you have diabetes:   Have your exam early in the morning. Your blood glucose will go up as the day goes by.   Your glucose level must be 180 or less at the start of the exam. Please take any medicines you need to ensure this blood glucose level. 24 hours before your scan: Don t do any heavy exercise. (No jogging, aerobics or other workouts.) Exercise will make your pictures less accurate. 6 hours before your scan:   Stop all food and liquids (except water).   Do not chew gum or suck on mints.   If you need to take medicine with food, you may take it with a few crackers.  Please call your Imaging Department at your exam site with any questions.            Oct 09, 2017  3:00 PM CDT   (Arrive by 2:45 PM)   Return Visit with Светлана Vang MD   [facility] ([facility])    [address] [address] [phone]              Who to contact     If you have questions or need follow up information about today's clinic visit or your schedule please contact [facility] directly at [phone].  Normal or non-critical lab and imaging results will be communicated to you by MyChart, letter or phone within 4 business days after the clinic has received the results. If you do not hear from us within 7 days, please contact the clinic through MyChart or phone. If you have a critical or abnormal lab result, we will notify you by phone as soon as possible.  Submit refill requests through [employer] or call your pharmacy and they will forward the refill request to us. Please allow 3 business days for your refill to be completed.     "      Additional Information About Your Visit        MyChart Information     [employer] lets you send messages to your doctor, view your test results, renew your prescriptions, schedule appointments and more. To sign up, go to [email or web address] . Click on \"Log in\" on the left side of the screen, which will take you to the Welcome page. Then click on \"Sign up Now\" on the right side of the page.     You will be asked to enter the access code listed below, as well as some personal information. Please follow the directions to create your username and password.     Your access code is: [email or web address]  Expires: 2017  5:15 PM     Your access code will  in 90 days. If you need help or a new code, please call your [place] clinic or [phone].        Care EveryWhere ID     This is your Care EveryWhere ID. This could be used by other organizations to access your [place] medical records  [id number]        Your Vitals Were     Pulse Temperature Height             80 98.8  F (37.1  C) (Tympanic) 5' 2\" (1.575 m)          Blood Pressure from Last 3 Encounters:   17 120/66   17 135/69   05/15/17 115/65    Weight from Last 3 Encounters:   17 149 lb 6.4 oz (67.8 kg)   17 147 lb 6.4 oz (66.9 kg)   02/15/17 152 lb (68.9 kg)              We Performed the Following     ADMIN INFLUENZA (For MEDICARE Patients ONLY) []     FLU VACCINE, INCREASED ANTIGEN, PRESV FREE, AGE 65+ [38953]          Today's Medication Changes          These changes are accurate as of: 17  1:43 PM.  If you have any questions, ask your nurse or doctor.               Start taking these medicines.        Dose/Directions    HYDROcodone-acetaminophen 5-325 MG per tablet   Commonly known as:  NORCO   Used for:  Sciatica of left side   Started by:  Omar Olivo MD        Dose:  1 tablet   Take 1 tablet by mouth every 6 hours as needed for moderate to severe pain maximum 4 tablet(s) per day   Quantity:  20 tablet "   Refills:  0            Where to get your medicines      Some of these will need a paper prescription and others can be bought over the counter.  Ask your nurse if you have questions.     Bring a paper prescription for each of these medications     HYDROcodone-acetaminophen 5-325 MG per tablet                Primary Care Provider Office Phone # Fax #    Omar Olivo [phone] [address] [facility] [address]        Equal Access to Services     SARAH DAVIDSON : Hadii aad ku hadasho Soomaali, waaxda luqadaha, qaybta kaalmada adeegyada, waxay idiin hayaan adeeg benigno lajosé miguel . So [facility] [phone].    ATENCIÓN: Si habla español, tiene a alexander disposición servicios gratuitos de asistencia lingüística. Rolandame al [phone].    We comply with applicable federal civil rights laws and [place] laws. We do not discriminate on the basis of race, color, national origin, age, disability sex, sexual orientation or gender identity.            Thank you!     Thank you for choosing [facility]  for your care. Our goal is always to provide you with excellent care. Hearing back from our patients is one way we can continue to improve our services. Please take a few minutes to complete the written survey that you may receive in the mail after your visit with us. Thank you!             Your Updated Medication List - Protect others around you: Learn how to safely use, store and throw away your medicines at www.disposemymeds.org.          This list is accurate as of: 9/25/17  1:43 PM.  Always use your most recent med list.                   Brand Name Dispense Instructions for use Diagnosis    ALEVE 220 MG capsule   Generic drug:  naproxen sodium      Take 220 mg by mouth 2 times daily (with meals). PRN        aspirin 81 MG tablet     100    1 TABLET BY MOUTH DAILY    Other and unspecified hyperlipidemia       CALCIUM 600 + D 600-200 MG-UNIT Tabs     3 MONTHS    Reported on 5/2/2017     Osteoporosis, unspecified       cetirizine 10 MG tablet    zyrTEC    90 tablet    Take 1 tablet (10 mg) by mouth daily    Allergic rhinitis       cyanocobalamin 1000 MCG tablet    vitamin  B-12    100 tablet    Take 1,000 mcg by mouth daily Reported on 5/2/2017    Routine general medical examination at a health care facility       HYDROcodone-acetaminophen 5-325 MG per tablet    NORCO    20 tablet    Take 1 tablet by mouth every 6 hours as needed for moderate to severe pain maximum 4 tablet(s) per day    Sciatica of left side       ibuprofen 800 MG tablet    ADVIL/MOTRIN     Take 800 mg by mouth every 8 hours as needed for moderate pain        levothyroxine 75 MCG tablet    SYNTHROID    90 tablet    Take 1 tablet (75 mcg) by mouth daily    Hypothyroidism, unspecified type       Multiple vitamin Tabs      Reported on 5/2/2017    Other specified pre-operative examination       raloxifene 60 MG tablet    EVISTA    90 tablet    Take 1 tablet (60 mg) by mouth daily Hold on file until needed    Osteoporosis       RANITIDINE 75 PO      Take 1 tablet by mouth daily Reported on 5/2/2017        simvastatin 10 MG tablet    ZOCOR    90 tablet    Take 1 tablet (10 mg) by mouth At Bedtime    Hyperlipidemia LDL goal <130       VITAMIN B6 PO      Take 100 mg by mouth daily

## 2017-10-18 ENCOUNTER — CARE COORDINATION (OUTPATIENT)
Dept: CARE COORDINATION | Facility: CLINIC | Age: 82
End: 2017-10-18

## 2017-10-18 NOTE — PROGRESS NOTES
Clinic Care Coordination Contact  [facility]/Voicemail    Referral Source: PCP (Neurology)  Clinical Data: Care Coordinator Outreach  Outreach attempted x 1.  Left message on voicemail with call back information and requested return call.  Plan: Care Coordinator mailed out care coordination introduction letter on 8/11/17. Care Coordinator will try to reach patient again in 5-15 business days.      Sushma Riley  Social Work Care Coordinator  [facility]  [phone]

## 2018-01-01 ENCOUNTER — DOCUMENTATION ONLY (OUTPATIENT)
Dept: CARE COORDINATION | Facility: CLINIC | Age: 83
End: 2018-01-01

## 2018-01-01 ENCOUNTER — TELEPHONE (OUTPATIENT)
Dept: FAMILY MEDICINE | Facility: CLINIC | Age: 83
End: 2018-01-01

## 2018-01-01 ENCOUNTER — APPOINTMENT (OUTPATIENT)
Dept: CT IMAGING | Facility: CLINIC | Age: 83
End: 2018-01-01
Attending: FAMILY MEDICINE
Payer: COMMERCIAL

## 2018-01-01 ENCOUNTER — MEDICAL CORRESPONDENCE (OUTPATIENT)
Dept: HEALTH INFORMATION MANAGEMENT | Facility: CLINIC | Age: 83
End: 2018-01-01

## 2018-01-01 ENCOUNTER — OFFICE VISIT (OUTPATIENT)
Dept: FAMILY MEDICINE | Facility: CLINIC | Age: 83
End: 2018-01-01
Payer: COMMERCIAL

## 2018-01-01 ENCOUNTER — ASSISTED LIVING VISIT (OUTPATIENT)
Dept: GERIATRICS | Facility: CLINIC | Age: 83
End: 2018-01-01
Payer: MEDICARE

## 2018-01-01 ENCOUNTER — TELEPHONE (OUTPATIENT)
Dept: PEDIATRICS | Facility: CLINIC | Age: 83
End: 2018-01-01

## 2018-01-01 ENCOUNTER — TRANSFERRED RECORDS (OUTPATIENT)
Dept: HEALTH INFORMATION MANAGEMENT | Facility: CLINIC | Age: 83
End: 2018-01-01

## 2018-01-01 ENCOUNTER — OFFICE VISIT (OUTPATIENT)
Dept: DERMATOLOGY | Facility: CLINIC | Age: 83
End: 2018-01-01
Payer: COMMERCIAL

## 2018-01-01 ENCOUNTER — RECORDS - HEALTHEAST (OUTPATIENT)
Dept: LAB | Facility: CLINIC | Age: 83
End: 2018-01-01

## 2018-01-01 ENCOUNTER — DOCUMENTATION ONLY (OUTPATIENT)
Dept: OTHER | Facility: CLINIC | Age: 83
End: 2018-01-01

## 2018-01-01 ENCOUNTER — APPOINTMENT (OUTPATIENT)
Dept: PHYSICAL THERAPY | Facility: CLINIC | Age: 83
DRG: 689 | End: 2018-01-01
Payer: COMMERCIAL

## 2018-01-01 ENCOUNTER — APPOINTMENT (OUTPATIENT)
Dept: CT IMAGING | Facility: CLINIC | Age: 83
DRG: 689 | End: 2018-01-01
Attending: EMERGENCY MEDICINE
Payer: COMMERCIAL

## 2018-01-01 ENCOUNTER — APPOINTMENT (OUTPATIENT)
Dept: GENERAL RADIOLOGY | Facility: CLINIC | Age: 83
DRG: 689 | End: 2018-01-01
Attending: EMERGENCY MEDICINE
Payer: COMMERCIAL

## 2018-01-01 ENCOUNTER — TELEPHONE (OUTPATIENT)
Dept: DERMATOLOGY | Facility: CLINIC | Age: 83
End: 2018-01-01

## 2018-01-01 ENCOUNTER — RADIANT APPOINTMENT (OUTPATIENT)
Dept: GENERAL RADIOLOGY | Facility: CLINIC | Age: 83
End: 2018-01-01
Attending: NURSE PRACTITIONER
Payer: COMMERCIAL

## 2018-01-01 ENCOUNTER — PATIENT OUTREACH (OUTPATIENT)
Dept: CARE COORDINATION | Facility: CLINIC | Age: 83
End: 2018-01-01

## 2018-01-01 ENCOUNTER — APPOINTMENT (OUTPATIENT)
Dept: OCCUPATIONAL THERAPY | Facility: CLINIC | Age: 83
DRG: 689 | End: 2018-01-01
Payer: COMMERCIAL

## 2018-01-01 ENCOUNTER — HOSPITAL ENCOUNTER (INPATIENT)
Facility: CLINIC | Age: 83
LOS: 2 days | Discharge: NURSING, REHAB OR LONG-TERM CARE | DRG: 689 | End: 2018-07-30
Attending: EMERGENCY MEDICINE | Admitting: INTERNAL MEDICINE
Payer: COMMERCIAL

## 2018-01-01 ENCOUNTER — APPOINTMENT (OUTPATIENT)
Dept: GENERAL RADIOLOGY | Facility: CLINIC | Age: 83
DRG: 689 | End: 2018-01-01
Attending: INTERNAL MEDICINE
Payer: COMMERCIAL

## 2018-01-01 ENCOUNTER — HOSPITAL ENCOUNTER (EMERGENCY)
Facility: CLINIC | Age: 83
Discharge: HOME | End: 2018-07-23
Attending: FAMILY MEDICINE | Admitting: FAMILY MEDICINE
Payer: COMMERCIAL

## 2018-01-01 ENCOUNTER — OFFICE VISIT (OUTPATIENT)
Dept: OBGYN | Facility: CLINIC | Age: 83
End: 2018-01-01
Payer: COMMERCIAL

## 2018-01-01 ENCOUNTER — RADIANT APPOINTMENT (OUTPATIENT)
Dept: GENERAL RADIOLOGY | Facility: CLINIC | Age: 83
End: 2018-01-01
Attending: FAMILY MEDICINE
Payer: COMMERCIAL

## 2018-01-01 VITALS
DIASTOLIC BLOOD PRESSURE: 87 MMHG | SYSTOLIC BLOOD PRESSURE: 169 MMHG | TEMPERATURE: 99.4 F | RESPIRATION RATE: 20 BRPM | HEART RATE: 71 BPM

## 2018-01-01 VITALS
HEIGHT: 63 IN | HEART RATE: 72 BPM | TEMPERATURE: 98.3 F | BODY MASS INDEX: 29.59 KG/M2 | WEIGHT: 167 LBS | DIASTOLIC BLOOD PRESSURE: 66 MMHG | SYSTOLIC BLOOD PRESSURE: 118 MMHG

## 2018-01-01 VITALS — SYSTOLIC BLOOD PRESSURE: 107 MMHG | OXYGEN SATURATION: 94 % | DIASTOLIC BLOOD PRESSURE: 59 MMHG | HEART RATE: 87 BPM

## 2018-01-01 VITALS
DIASTOLIC BLOOD PRESSURE: 58 MMHG | SYSTOLIC BLOOD PRESSURE: 106 MMHG | BODY MASS INDEX: 29.59 KG/M2 | OXYGEN SATURATION: 93 % | TEMPERATURE: 98.5 F | HEIGHT: 63 IN | WEIGHT: 167 LBS | HEART RATE: 78 BPM

## 2018-01-01 VITALS
HEART RATE: 72 BPM | HEIGHT: 63 IN | BODY MASS INDEX: 29.59 KG/M2 | DIASTOLIC BLOOD PRESSURE: 58 MMHG | WEIGHT: 167 LBS | SYSTOLIC BLOOD PRESSURE: 108 MMHG | TEMPERATURE: 99 F

## 2018-01-01 VITALS
BODY MASS INDEX: 28.95 KG/M2 | HEIGHT: 63 IN | WEIGHT: 163.4 LBS | RESPIRATION RATE: 20 BRPM | SYSTOLIC BLOOD PRESSURE: 119 MMHG | DIASTOLIC BLOOD PRESSURE: 71 MMHG | TEMPERATURE: 98.7 F | HEART RATE: 83 BPM

## 2018-01-01 VITALS
OXYGEN SATURATION: 94 % | HEART RATE: 77 BPM | RESPIRATION RATE: 18 BRPM | WEIGHT: 174.82 LBS | DIASTOLIC BLOOD PRESSURE: 80 MMHG | BODY MASS INDEX: 31.47 KG/M2 | SYSTOLIC BLOOD PRESSURE: 161 MMHG | TEMPERATURE: 97.5 F

## 2018-01-01 VITALS
HEIGHT: 63 IN | DIASTOLIC BLOOD PRESSURE: 70 MMHG | WEIGHT: 168 LBS | HEART RATE: 72 BPM | BODY MASS INDEX: 29.77 KG/M2 | TEMPERATURE: 98.3 F | SYSTOLIC BLOOD PRESSURE: 116 MMHG

## 2018-01-01 VITALS
SYSTOLIC BLOOD PRESSURE: 142 MMHG | RESPIRATION RATE: 18 BRPM | DIASTOLIC BLOOD PRESSURE: 88 MMHG | TEMPERATURE: 98.2 F | OXYGEN SATURATION: 95 % | HEART RATE: 81 BPM

## 2018-01-01 VITALS
WEIGHT: 163 LBS | HEART RATE: 77 BPM | HEIGHT: 63 IN | SYSTOLIC BLOOD PRESSURE: 127 MMHG | DIASTOLIC BLOOD PRESSURE: 75 MMHG | RESPIRATION RATE: 18 BRPM | BODY MASS INDEX: 28.88 KG/M2 | TEMPERATURE: 98 F

## 2018-01-01 VITALS
SYSTOLIC BLOOD PRESSURE: 116 MMHG | HEART RATE: 72 BPM | WEIGHT: 168 LBS | BODY MASS INDEX: 30.24 KG/M2 | DIASTOLIC BLOOD PRESSURE: 70 MMHG | TEMPERATURE: 98.3 F

## 2018-01-01 VITALS
TEMPERATURE: 98.7 F | SYSTOLIC BLOOD PRESSURE: 144 MMHG | DIASTOLIC BLOOD PRESSURE: 74 MMHG | RESPIRATION RATE: 20 BRPM | HEART RATE: 71 BPM

## 2018-01-01 DIAGNOSIS — E78.5 HYPERLIPIDEMIA LDL GOAL <130: ICD-10-CM

## 2018-01-01 DIAGNOSIS — R50.9 FEVER, UNSPECIFIED FEVER CAUSE: ICD-10-CM

## 2018-01-01 DIAGNOSIS — G30.9 ALZHEIMER'S DEMENTIA WITHOUT BEHAVIORAL DISTURBANCE, UNSPECIFIED TIMING OF DEMENTIA ONSET: ICD-10-CM

## 2018-01-01 DIAGNOSIS — M25.512 CHRONIC LEFT SHOULDER PAIN: Primary | ICD-10-CM

## 2018-01-01 DIAGNOSIS — M81.0 OSTEOPOROSIS, UNSPECIFIED OSTEOPOROSIS TYPE, UNSPECIFIED PATHOLOGICAL FRACTURE PRESENCE: ICD-10-CM

## 2018-01-01 DIAGNOSIS — F02.80 ALZHEIMER'S DEMENTIA WITHOUT BEHAVIORAL DISTURBANCE, UNSPECIFIED TIMING OF DEMENTIA ONSET: Primary | ICD-10-CM

## 2018-01-01 DIAGNOSIS — L81.4 LENTIGO: Primary | ICD-10-CM

## 2018-01-01 DIAGNOSIS — E03.9 HYPOTHYROIDISM, UNSPECIFIED TYPE: ICD-10-CM

## 2018-01-01 DIAGNOSIS — M25.552 HIP PAIN, LEFT: ICD-10-CM

## 2018-01-01 DIAGNOSIS — Z51.5 HOSPICE CARE PATIENT: ICD-10-CM

## 2018-01-01 DIAGNOSIS — Z71.89 ADVANCED DIRECTIVES, COUNSELING/DISCUSSION: Chronic | ICD-10-CM

## 2018-01-01 DIAGNOSIS — M62.81 GENERALIZED MUSCLE WEAKNESS: ICD-10-CM

## 2018-01-01 DIAGNOSIS — F02.80 ALZHEIMER'S DEMENTIA WITHOUT BEHAVIORAL DISTURBANCE, UNSPECIFIED TIMING OF DEMENTIA ONSET: ICD-10-CM

## 2018-01-01 DIAGNOSIS — N30.00 ACUTE CYSTITIS WITHOUT HEMATURIA: ICD-10-CM

## 2018-01-01 DIAGNOSIS — Z71.89 ADVANCED DIRECTIVES, COUNSELING/DISCUSSION: ICD-10-CM

## 2018-01-01 DIAGNOSIS — R30.0 DYSURIA: Primary | ICD-10-CM

## 2018-01-01 DIAGNOSIS — N39.3 FEMALE STRESS INCONTINENCE: Primary | ICD-10-CM

## 2018-01-01 DIAGNOSIS — R50.9 FEVER, UNSPECIFIED FEVER CAUSE: Primary | ICD-10-CM

## 2018-01-01 DIAGNOSIS — R30.0 DYSURIA: ICD-10-CM

## 2018-01-01 DIAGNOSIS — Z85.820 HISTORY OF MELANOMA: ICD-10-CM

## 2018-01-01 DIAGNOSIS — G30.9 ALZHEIMER'S DEMENTIA WITHOUT BEHAVIORAL DISTURBANCE, UNSPECIFIED TIMING OF DEMENTIA ONSET: Primary | ICD-10-CM

## 2018-01-01 DIAGNOSIS — G89.29 OTHER CHRONIC PAIN: ICD-10-CM

## 2018-01-01 DIAGNOSIS — G89.29 CHRONIC LEFT SHOULDER PAIN: Primary | ICD-10-CM

## 2018-01-01 DIAGNOSIS — R05.9 COUGH: Primary | ICD-10-CM

## 2018-01-01 DIAGNOSIS — Z00.00 ROUTINE GENERAL MEDICAL EXAMINATION AT A HEALTH CARE FACILITY: ICD-10-CM

## 2018-01-01 DIAGNOSIS — R82.90 NONSPECIFIC FINDING ON EXAMINATION OF URINE: ICD-10-CM
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
## 2018-01-01 DIAGNOSIS — E03.8 OTHER SPECIFIED HYPOTHYROIDISM: ICD-10-CM

## 2018-01-01 DIAGNOSIS — S01.01XD LACERATION OF SCALP WITHOUT FOREIGN BODY, SUBSEQUENT ENCOUNTER: ICD-10-CM

## 2018-01-01 DIAGNOSIS — S09.90XA CLOSED HEAD INJURY, INITIAL ENCOUNTER: ICD-10-CM

## 2018-01-01 DIAGNOSIS — J30.1 CHRONIC SEASONAL ALLERGIC RHINITIS DUE TO POLLEN: ICD-10-CM

## 2018-01-01 DIAGNOSIS — S01.01XA LACERATION OF SCALP, INITIAL ENCOUNTER: ICD-10-CM

## 2018-01-01 DIAGNOSIS — R13.10 DYSPHAGIA, UNSPECIFIED TYPE: Primary | ICD-10-CM

## 2018-01-01 DIAGNOSIS — Z85.828 HISTORY OF SKIN CANCER: ICD-10-CM

## 2018-01-01 DIAGNOSIS — M81.0 AGE-RELATED OSTEOPOROSIS WITHOUT CURRENT PATHOLOGICAL FRACTURE: ICD-10-CM

## 2018-01-01 DIAGNOSIS — R05.9 COUGH: ICD-10-CM

## 2018-01-01 DIAGNOSIS — R32 INCONTINENCE IN FEMALE: Primary | ICD-10-CM

## 2018-01-01 DIAGNOSIS — R41.82 ALTERED MENTAL STATUS, UNSPECIFIED ALTERED MENTAL STATUS TYPE: ICD-10-CM

## 2018-01-01 DIAGNOSIS — J30.9 CHRONIC ALLERGIC RHINITIS, UNSPECIFIED SEASONALITY, UNSPECIFIED TRIGGER: ICD-10-CM

## 2018-01-01 DIAGNOSIS — R29.6 FALLS FREQUENTLY: ICD-10-CM

## 2018-01-01 DIAGNOSIS — K59.00 CONSTIPATION, UNSPECIFIED CONSTIPATION TYPE: Primary | ICD-10-CM

## 2018-01-01 DIAGNOSIS — Z79.899 ENCOUNTER FOR MEDICATION REVIEW: ICD-10-CM

## 2018-01-01 DIAGNOSIS — K21.9 GASTROESOPHAGEAL REFLUX DISEASE WITHOUT ESOPHAGITIS: Primary | ICD-10-CM

## 2018-01-01 DIAGNOSIS — L82.1 SK (SEBORRHEIC KERATOSIS): ICD-10-CM

## 2018-01-01 LAB
ALBUMIN SERPL-MCNC: 2.9 G/DL (ref 3.4–5)
ALBUMIN UR-MCNC: NEGATIVE MG/DL
ALP SERPL-CCNC: 72 U/L (ref 40–150)
ALT SERPL W P-5'-P-CCNC: 18 U/L (ref 0–50)
ANION GAP SERPL CALCULATED.3IONS-SCNC: 3 MMOL/L (ref 3–14)
ANION GAP SERPL CALCULATED.3IONS-SCNC: 4 MMOL/L (ref 3–14)
ANION GAP SERPL CALCULATED.3IONS-SCNC: 5 MMOL/L (ref 3–14)
ANION GAP SERPL CALCULATED.3IONS-SCNC: 9 MMOL/L (ref 3–14)
APPEARANCE UR: ABNORMAL
APPEARANCE UR: CLEAR
AST SERPL W P-5'-P-CCNC: 24 U/L (ref 0–45)
BACTERIA #/AREA URNS HPF: ABNORMAL /HPF
BACTERIA #/AREA URNS HPF: ABNORMAL /HPF
BACTERIA #/AREA URNS HPF: ABNORMAL HPF
BACTERIA SPEC CULT: ABNORMAL
BACTERIA SPEC CULT: NO GROWTH
BACTERIA SPEC CULT: NO GROWTH
BACTERIA SPEC CULT: NORMAL
BASOPHILS # BLD AUTO: 0 10E9/L (ref 0–0.2)
BASOPHILS NFR BLD AUTO: 0.1 %
BASOPHILS NFR BLD AUTO: 0.2 %
BASOPHILS NFR BLD AUTO: 0.3 %
BASOPHILS NFR BLD AUTO: 0.3 %
BILIRUB SERPL-MCNC: 0.8 MG/DL (ref 0.2–1.3)
BILIRUB UR QL STRIP: NEGATIVE
BUN SERPL-MCNC: 14 MG/DL (ref 7–30)
BUN SERPL-MCNC: 16 MG/DL (ref 7–30)
BUN SERPL-MCNC: 17 MG/DL (ref 7–30)
BUN SERPL-MCNC: 17 MG/DL (ref 7–30)
CALCIUM SERPL-MCNC: 8.1 MG/DL (ref 8.5–10.1)
CALCIUM SERPL-MCNC: 8.6 MG/DL (ref 8.5–10.1)
CALCIUM SERPL-MCNC: 8.7 MG/DL (ref 8.5–10.1)
CALCIUM SERPL-MCNC: 8.9 MG/DL (ref 8.5–10.1)
CHLORIDE SERPL-SCNC: 101 MMOL/L (ref 94–109)
CHLORIDE SERPL-SCNC: 101 MMOL/L (ref 94–109)
CHLORIDE SERPL-SCNC: 103 MMOL/L (ref 94–109)
CHLORIDE SERPL-SCNC: 107 MMOL/L (ref 94–109)
CO2 SERPL-SCNC: 26 MMOL/L (ref 20–32)
CO2 SERPL-SCNC: 27 MMOL/L (ref 20–32)
CO2 SERPL-SCNC: 29 MMOL/L (ref 20–32)
CO2 SERPL-SCNC: 30 MMOL/L (ref 20–32)
COLOR UR AUTO: NORMAL
COLOR UR AUTO: NORMAL
COLOR UR AUTO: YELLOW
CREAT SERPL-MCNC: 0.7 MG/DL (ref 0.52–1.04)
CREAT SERPL-MCNC: 0.71 MG/DL (ref 0.52–1.04)
CREAT SERPL-MCNC: 0.78 MG/DL (ref 0.52–1.04)
CREAT SERPL-MCNC: 0.81 MG/DL (ref 0.52–1.04)
DIFFERENTIAL METHOD BLD: ABNORMAL
EOSINOPHIL # BLD AUTO: 0 10E9/L (ref 0–0.7)
EOSINOPHIL # BLD AUTO: 0.2 10E9/L (ref 0–0.7)
EOSINOPHIL # BLD AUTO: 0.3 10E9/L (ref 0–0.7)
EOSINOPHIL # BLD AUTO: 0.4 10E9/L (ref 0–0.7)
EOSINOPHIL NFR BLD AUTO: 0.3 %
EOSINOPHIL NFR BLD AUTO: 1.2 %
EOSINOPHIL NFR BLD AUTO: 2.3 %
EOSINOPHIL NFR BLD AUTO: 4.8 %
ERYTHROCYTE [DISTWIDTH] IN BLOOD BY AUTOMATED COUNT: 13.4 % (ref 10–15)
ERYTHROCYTE [DISTWIDTH] IN BLOOD BY AUTOMATED COUNT: 13.5 % (ref 10–15)
ERYTHROCYTE [DISTWIDTH] IN BLOOD BY AUTOMATED COUNT: 13.7 % (ref 10–15)
GFR SERPL CREATININE-BSD FRML MDRD: 67 ML/MIN/1.7M2
GFR SERPL CREATININE-BSD FRML MDRD: 70 ML/MIN/1.7M2
GFR SERPL CREATININE-BSD FRML MDRD: 78 ML/MIN/1.7M2
GFR SERPL CREATININE-BSD FRML MDRD: 79 ML/MIN/1.7M2
GLUCOSE SERPL-MCNC: 104 MG/DL (ref 70–99)
GLUCOSE SERPL-MCNC: 105 MG/DL (ref 70–99)
GLUCOSE SERPL-MCNC: 116 MG/DL (ref 70–99)
GLUCOSE SERPL-MCNC: 86 MG/DL (ref 70–99)
GLUCOSE UR STRIP-MCNC: NEGATIVE MG/DL
HCT VFR BLD AUTO: 37.7 % (ref 35–47)
HCT VFR BLD AUTO: 38.1 % (ref 35–47)
HCT VFR BLD AUTO: 39.9 % (ref 35–47)
HCT VFR BLD AUTO: 40.1 % (ref 35–47)
HCT VFR BLD AUTO: 41.2 % (ref 35–47)
HGB BLD-MCNC: 12.3 G/DL (ref 11.7–15.7)
HGB BLD-MCNC: 12.4 G/DL (ref 11.7–15.7)
HGB BLD-MCNC: 12.8 G/DL (ref 11.7–15.7)
HGB BLD-MCNC: 13 G/DL (ref 11.7–15.7)
HGB BLD-MCNC: 13.4 G/DL (ref 11.7–15.7)
HGB UR QL STRIP: ABNORMAL
HGB UR QL STRIP: ABNORMAL
HGB UR QL STRIP: NEGATIVE
IMM GRANULOCYTES # BLD: 0.1 10E9/L (ref 0–0.4)
IMM GRANULOCYTES NFR BLD: 0.4 %
IMM GRANULOCYTES NFR BLD: 0.5 %
IMM GRANULOCYTES NFR BLD: 0.6 %
KETONES UR STRIP-MCNC: NEGATIVE MG/DL
LACTATE BLD-SCNC: 0.9 MMOL/L (ref 0.7–2)
LACTATE BLD-SCNC: 1.4 MMOL/L (ref 0.7–2)
LEUKOCYTE ESTERASE UR QL STRIP: ABNORMAL
LEUKOCYTE ESTERASE UR QL STRIP: NEGATIVE
LEUKOCYTE ESTERASE UR QL STRIP: NEGATIVE
LYMPHOCYTES # BLD AUTO: 1.7 10E9/L (ref 0.8–5.3)
LYMPHOCYTES # BLD AUTO: 1.8 10E9/L (ref 0.8–5.3)
LYMPHOCYTES NFR BLD AUTO: 11.8 %
LYMPHOCYTES NFR BLD AUTO: 13.7 %
LYMPHOCYTES NFR BLD AUTO: 13.9 %
LYMPHOCYTES NFR BLD AUTO: 20.2 %
Lab: NORMAL
MCH RBC QN AUTO: 32 PG (ref 26.5–33)
MCH RBC QN AUTO: 32 PG (ref 26.5–33)
MCH RBC QN AUTO: 32.2 PG (ref 26.5–33)
MCH RBC QN AUTO: 32.7 PG (ref 26.5–33)
MCH RBC QN AUTO: 32.8 PG (ref 26.5–33)
MCHC RBC AUTO-ENTMCNC: 32.1 G/DL (ref 31.5–36.5)
MCHC RBC AUTO-ENTMCNC: 32.3 G/DL (ref 31.5–36.5)
MCHC RBC AUTO-ENTMCNC: 32.4 G/DL (ref 31.5–36.5)
MCHC RBC AUTO-ENTMCNC: 32.5 G/DL (ref 31.5–36.5)
MCHC RBC AUTO-ENTMCNC: 32.9 G/DL (ref 31.5–36.5)
MCV RBC AUTO: 100 FL (ref 78–100)
MCV RBC AUTO: 101 FL (ref 78–100)
MCV RBC AUTO: 101 FL (ref 78–100)
MCV RBC AUTO: 98 FL (ref 78–100)
MCV RBC AUTO: 99 FL (ref 78–100)
MONOCYTES # BLD AUTO: 1.1 10E9/L (ref 0–1.3)
MONOCYTES # BLD AUTO: 1.3 10E9/L (ref 0–1.3)
MONOCYTES # BLD AUTO: 1.9 10E9/L (ref 0–1.3)
MONOCYTES # BLD AUTO: 2 10E9/L (ref 0–1.3)
MONOCYTES NFR BLD AUTO: 11.1 %
MONOCYTES NFR BLD AUTO: 12.1 %
MONOCYTES NFR BLD AUTO: 13.9 %
MONOCYTES NFR BLD AUTO: 15.3 %
MUCOUS THREADS #/AREA URNS LPF: ABNORMAL LPF
MUCOUS THREADS #/AREA URNS LPF: PRESENT /LPF
NEUTROPHILS # BLD AUTO: 10.5 10E9/L (ref 1.6–8.3)
NEUTROPHILS # BLD AUTO: 5.4 10E9/L (ref 1.6–8.3)
NEUTROPHILS # BLD AUTO: 8.7 10E9/L (ref 1.6–8.3)
NEUTROPHILS # BLD AUTO: 8.9 10E9/L (ref 1.6–8.3)
NEUTROPHILS NFR BLD AUTO: 62 %
NEUTROPHILS NFR BLD AUTO: 70.1 %
NEUTROPHILS NFR BLD AUTO: 72.4 %
NEUTROPHILS NFR BLD AUTO: 72.5 %
NITRATE UR QL: NEGATIVE
NITRATE UR QL: POSITIVE
NRBC # BLD AUTO: 0 10*3/UL
NRBC BLD AUTO-RTO: 0 /100
PH UR STRIP: 5.5 PH (ref 5–7)
PH UR STRIP: 5.5 [PH] (ref 4.5–8)
PH UR STRIP: 6 PH (ref 5–7)
PH UR STRIP: 6.5 [PH] (ref 4.5–8)
PH UR STRIP: 8 [PH] (ref 4.5–8)
PLATELET # BLD AUTO: 212 10E9/L (ref 150–450)
PLATELET # BLD AUTO: 218 10E9/L (ref 150–450)
PLATELET # BLD AUTO: 237 10E9/L (ref 150–450)
PLATELET # BLD AUTO: 240 10E9/L (ref 150–450)
PLATELET # BLD AUTO: 284 10E9/L (ref 150–450)
POTASSIUM SERPL-SCNC: 3.6 MMOL/L (ref 3.4–5.3)
POTASSIUM SERPL-SCNC: 3.6 MMOL/L (ref 3.4–5.3)
POTASSIUM SERPL-SCNC: 3.8 MMOL/L (ref 3.4–5.3)
POTASSIUM SERPL-SCNC: 3.9 MMOL/L (ref 3.4–5.3)
PROT SERPL-MCNC: 6.9 G/DL (ref 6.8–8.8)
RBC # BLD AUTO: 3.84 10E12/L (ref 3.8–5.2)
RBC # BLD AUTO: 3.85 10E12/L (ref 3.8–5.2)
RBC # BLD AUTO: 3.96 10E12/L (ref 3.8–5.2)
RBC # BLD AUTO: 4 10E12/L (ref 3.8–5.2)
RBC # BLD AUTO: 4.1 10E12/L (ref 3.8–5.2)
RBC #/AREA URNS AUTO: 5 /HPF (ref 0–2)
RBC #/AREA URNS AUTO: ABNORMAL /HPF
RBC #/AREA URNS AUTO: ABNORMAL HPF
SODIUM SERPL-SCNC: 134 MMOL/L (ref 133–144)
SODIUM SERPL-SCNC: 136 MMOL/L (ref 133–144)
SODIUM SERPL-SCNC: 136 MMOL/L (ref 133–144)
SODIUM SERPL-SCNC: 139 MMOL/L (ref 133–144)
SOURCE: ABNORMAL
SOURCE: ABNORMAL
SP GR UR STRIP: 1.01 (ref 1–1.03)
SP GR UR STRIP: 1.02 (ref 1–1.03)
SPECIMEN SOURCE: ABNORMAL
SPECIMEN SOURCE: NORMAL
SQUAMOUS #/AREA URNS AUTO: 1 /HPF (ref 0–1)
SQUAMOUS #/AREA URNS AUTO: ABNORMAL LPF
T4 FREE SERPL-MCNC: 1.08 NG/DL (ref 0.76–1.46)
TSH SERPL DL<=0.005 MIU/L-ACNC: 3.69 MU/L (ref 0.4–4)
UROBILINOGEN UR STRIP-ACNC: 0.2 EU/DL (ref 0.2–1)
UROBILINOGEN UR STRIP-ACNC: ABNORMAL
UROBILINOGEN UR STRIP-ACNC: NORMAL
UROBILINOGEN UR STRIP-ACNC: NORMAL
UROBILINOGEN UR STRIP-MCNC: 0 MG/DL (ref 0–2)
WBC # BLD AUTO: 12 10E9/L (ref 4–11)
WBC # BLD AUTO: 12.7 10E9/L (ref 4–11)
WBC # BLD AUTO: 14.5 10E9/L (ref 4–11)
WBC # BLD AUTO: 8.7 10E9/L (ref 4–11)
WBC # BLD AUTO: 9.7 10E9/L (ref 4–11)
WBC #/AREA URNS AUTO: 8 /HPF (ref 0–5)
WBC #/AREA URNS AUTO: ABNORMAL /HPF
WBC #/AREA URNS AUTO: ABNORMAL HPF
WBC CLUMPS #/AREA URNS HPF: PRESENT /HPF

## 2018-01-01 PROCEDURE — 97110 THERAPEUTIC EXERCISES: CPT | Mod: GP

## 2018-01-01 PROCEDURE — 40000133 ZZH STATISTIC OT WARD VISIT

## 2018-01-01 PROCEDURE — 87186 SC STD MICRODIL/AGAR DIL: CPT | Performed by: FAMILY MEDICINE

## 2018-01-01 PROCEDURE — 99222 1ST HOSP IP/OBS MODERATE 55: CPT | Mod: AI | Performed by: INTERNAL MEDICINE

## 2018-01-01 PROCEDURE — 84439 ASSAY OF FREE THYROXINE: CPT | Performed by: FAMILY MEDICINE

## 2018-01-01 PROCEDURE — 25000132 ZZH RX MED GY IP 250 OP 250 PS 637: Performed by: INTERNAL MEDICINE

## 2018-01-01 PROCEDURE — 25000132 ZZH RX MED GY IP 250 OP 250 PS 637: Performed by: EMERGENCY MEDICINE

## 2018-01-01 PROCEDURE — 85025 COMPLETE CBC W/AUTO DIFF WBC: CPT | Performed by: EMERGENCY MEDICINE

## 2018-01-01 PROCEDURE — 83605 ASSAY OF LACTIC ACID: CPT | Performed by: INTERNAL MEDICINE

## 2018-01-01 PROCEDURE — 97116 GAIT TRAINING THERAPY: CPT | Mod: GP

## 2018-01-01 PROCEDURE — 12000000 ZZH R&B MED SURG/OB

## 2018-01-01 PROCEDURE — 99214 OFFICE O/P EST MOD 30 MIN: CPT | Performed by: FAMILY MEDICINE

## 2018-01-01 PROCEDURE — 36415 COLL VENOUS BLD VENIPUNCTURE: CPT | Performed by: FAMILY MEDICINE

## 2018-01-01 PROCEDURE — 99214 OFFICE O/P EST MOD 30 MIN: CPT | Performed by: NURSE PRACTITIONER

## 2018-01-01 PROCEDURE — 97165 OT EVAL LOW COMPLEX 30 MIN: CPT | Mod: GO

## 2018-01-01 PROCEDURE — 72125 CT NECK SPINE W/O DYE: CPT

## 2018-01-01 PROCEDURE — 25000128 H RX IP 250 OP 636: Performed by: EMERGENCY MEDICINE

## 2018-01-01 PROCEDURE — 80048 BASIC METABOLIC PNL TOTAL CA: CPT | Performed by: FAMILY MEDICINE

## 2018-01-01 PROCEDURE — 36415 COLL VENOUS BLD VENIPUNCTURE: CPT | Performed by: EMERGENCY MEDICINE

## 2018-01-01 PROCEDURE — 25000128 H RX IP 250 OP 636: Performed by: INTERNAL MEDICINE

## 2018-01-01 PROCEDURE — 70450 CT HEAD/BRAIN W/O DYE: CPT

## 2018-01-01 PROCEDURE — 99284 EMERGENCY DEPT VISIT MOD MDM: CPT | Mod: 25 | Performed by: FAMILY MEDICINE

## 2018-01-01 PROCEDURE — 99203 OFFICE O/P NEW LOW 30 MIN: CPT | Performed by: OBSTETRICS & GYNECOLOGY

## 2018-01-01 PROCEDURE — 81001 URINALYSIS AUTO W/SCOPE: CPT | Performed by: FAMILY MEDICINE

## 2018-01-01 PROCEDURE — 99215 OFFICE O/P EST HI 40 MIN: CPT | Performed by: FAMILY MEDICINE

## 2018-01-01 PROCEDURE — 12002 RPR S/N/AX/GEN/TRNK2.6-7.5CM: CPT | Mod: Z6 | Performed by: FAMILY MEDICINE

## 2018-01-01 PROCEDURE — 85025 COMPLETE CBC W/AUTO DIFF WBC: CPT | Performed by: FAMILY MEDICINE

## 2018-01-01 PROCEDURE — 99285 EMERGENCY DEPT VISIT HI MDM: CPT | Mod: 25 | Performed by: EMERGENCY MEDICINE

## 2018-01-01 PROCEDURE — 97530 THERAPEUTIC ACTIVITIES: CPT | Mod: GO

## 2018-01-01 PROCEDURE — 84443 ASSAY THYROID STIM HORMONE: CPT | Performed by: FAMILY MEDICINE

## 2018-01-01 PROCEDURE — 80048 BASIC METABOLIC PNL TOTAL CA: CPT | Performed by: EMERGENCY MEDICINE

## 2018-01-01 PROCEDURE — 87088 URINE BACTERIA CULTURE: CPT | Performed by: FAMILY MEDICINE

## 2018-01-01 PROCEDURE — 99213 OFFICE O/P EST LOW 20 MIN: CPT | Performed by: DERMATOLOGY

## 2018-01-01 PROCEDURE — 99285 EMERGENCY DEPT VISIT HI MDM: CPT | Mod: 25 | Performed by: FAMILY MEDICINE

## 2018-01-01 PROCEDURE — 99238 HOSP IP/OBS DSCHRG MGMT 30/<: CPT | Performed by: INTERNAL MEDICINE

## 2018-01-01 PROCEDURE — 71046 X-RAY EXAM CHEST 2 VIEWS: CPT | Mod: FY

## 2018-01-01 PROCEDURE — 73523 X-RAY EXAM HIPS BI 5/> VIEWS: CPT

## 2018-01-01 PROCEDURE — 12002 RPR S/N/AX/GEN/TRNK2.6-7.5CM: CPT | Performed by: FAMILY MEDICINE

## 2018-01-01 PROCEDURE — 97161 PT EVAL LOW COMPLEX 20 MIN: CPT | Mod: GP

## 2018-01-01 PROCEDURE — 97535 SELF CARE MNGMENT TRAINING: CPT | Mod: GO

## 2018-01-01 PROCEDURE — 36415 COLL VENOUS BLD VENIPUNCTURE: CPT | Performed by: INTERNAL MEDICINE

## 2018-01-01 PROCEDURE — 99207 C PAF COMPLETED  NO CHARGE: CPT | Performed by: FAMILY MEDICINE

## 2018-01-01 PROCEDURE — 96366 THER/PROPH/DIAG IV INF ADDON: CPT | Performed by: EMERGENCY MEDICINE

## 2018-01-01 PROCEDURE — 97110 THERAPEUTIC EXERCISES: CPT | Mod: GO

## 2018-01-01 PROCEDURE — 87086 URINE CULTURE/COLONY COUNT: CPT | Performed by: EMERGENCY MEDICINE

## 2018-01-01 PROCEDURE — 87086 URINE CULTURE/COLONY COUNT: CPT | Performed by: FAMILY MEDICINE

## 2018-01-01 PROCEDURE — 40000193 ZZH STATISTIC PT WARD VISIT

## 2018-01-01 PROCEDURE — 71045 X-RAY EXAM CHEST 1 VIEW: CPT

## 2018-01-01 PROCEDURE — 99232 SBSQ HOSP IP/OBS MODERATE 35: CPT | Performed by: INTERNAL MEDICINE

## 2018-01-01 PROCEDURE — 83605 ASSAY OF LACTIC ACID: CPT | Performed by: EMERGENCY MEDICINE

## 2018-01-01 PROCEDURE — 80053 COMPREHEN METABOLIC PANEL: CPT | Performed by: EMERGENCY MEDICINE

## 2018-01-01 PROCEDURE — 96365 THER/PROPH/DIAG IV INF INIT: CPT | Performed by: EMERGENCY MEDICINE

## 2018-01-01 PROCEDURE — 85027 COMPLETE CBC AUTOMATED: CPT | Performed by: INTERNAL MEDICINE

## 2018-01-01 PROCEDURE — G0180 MD CERTIFICATION HHA PATIENT: HCPCS | Performed by: FAMILY MEDICINE

## 2018-01-01 PROCEDURE — 81001 URINALYSIS AUTO W/SCOPE: CPT | Performed by: EMERGENCY MEDICINE

## 2018-01-01 PROCEDURE — 96361 HYDRATE IV INFUSION ADD-ON: CPT | Performed by: EMERGENCY MEDICINE

## 2018-01-01 RX ORDER — BISACODYL 10 MG
10 SUPPOSITORY, RECTAL RECTAL DAILY PRN
Qty: 25 SUPPOSITORY | Refills: 1
Start: 2018-01-01

## 2018-01-01 RX ORDER — CEPHALEXIN 500 MG/1
500 CAPSULE ORAL 2 TIMES DAILY
Qty: 6 CAPSULE | Refills: 0 | DISCHARGE
Start: 2018-01-01 | End: 2018-01-01

## 2018-01-01 RX ORDER — MULTIVITAMIN WITH IRON
100 TABLET ORAL DAILY
Qty: 31 TABLET | Refills: 11 | Status: SHIPPED | OUTPATIENT
Start: 2018-01-01 | End: 2018-01-01

## 2018-01-01 RX ORDER — LEVOTHYROXINE SODIUM 75 UG/1
75 TABLET ORAL DAILY
Qty: 31 TABLET | Refills: 11 | Status: SHIPPED | OUTPATIENT
Start: 2018-01-01 | End: 2018-01-01 | Stop reason: ALTCHOICE

## 2018-01-01 RX ORDER — CETIRIZINE HYDROCHLORIDE 10 MG/1
10 TABLET ORAL DAILY
Status: DISCONTINUED | OUTPATIENT
Start: 2018-01-01 | End: 2018-01-01 | Stop reason: HOSPADM

## 2018-01-01 RX ORDER — CEFTRIAXONE SODIUM 1 G/50ML
1 INJECTION, SOLUTION INTRAVENOUS ONCE
Status: DISCONTINUED | OUTPATIENT
Start: 2018-01-01 | End: 2018-01-01

## 2018-01-01 RX ORDER — ASPIRIN 81 MG
TABLET,CHEWABLE ORAL
Qty: 31 TABLET | Refills: 11 | Status: SHIPPED | OUTPATIENT
Start: 2018-01-01 | End: 2018-01-01

## 2018-01-01 RX ORDER — LORAZEPAM 0.5 MG/1
0.25 TABLET ORAL EVERY 4 HOURS PRN
Qty: 20 TABLET | Refills: 5
Start: 2018-01-01

## 2018-01-01 RX ORDER — CETIRIZINE HYDROCHLORIDE 10 MG/1
10 TABLET ORAL DAILY
COMMUNITY
End: 2018-01-01

## 2018-01-01 RX ORDER — ASPIRIN 81 MG/1
81 TABLET, CHEWABLE ORAL DAILY
Status: DISCONTINUED | OUTPATIENT
Start: 2018-01-01 | End: 2018-01-01 | Stop reason: HOSPADM

## 2018-01-01 RX ORDER — LANOLIN ALCOHOL/MO/W.PET/CERES
CREAM (GRAM) TOPICAL
Qty: 31 TABLET | Refills: 11 | Status: SHIPPED | OUTPATIENT
Start: 2018-01-01 | End: 2018-01-01

## 2018-01-01 RX ORDER — IBUPROFEN 600 MG/1
600 TABLET, FILM COATED ORAL EVERY 6 HOURS PRN
Status: DISCONTINUED | OUTPATIENT
Start: 2018-01-01 | End: 2018-01-01 | Stop reason: HOSPADM

## 2018-01-01 RX ORDER — ACYCLOVIR 200 MG/1
3 CAPSULE ORAL ONCE
Status: DISCONTINUED | OUTPATIENT
Start: 2018-01-01 | End: 2018-01-01 | Stop reason: HOSPADM

## 2018-01-01 RX ORDER — SODIUM CHLORIDE 9 MG/ML
1000 INJECTION, SOLUTION INTRAVENOUS CONTINUOUS
Status: DISCONTINUED | OUTPATIENT
Start: 2018-01-01 | End: 2018-01-01

## 2018-01-01 RX ORDER — ACYCLOVIR 200 MG/1
3 CAPSULE ORAL ONCE
Status: DISCONTINUED | OUTPATIENT
Start: 2018-01-01 | End: 2018-01-01

## 2018-01-01 RX ORDER — MULTIVITAMIN WITH FOLIC ACID 400 MCG
TABLET ORAL
Qty: 31 TABLET | Refills: 11 | Status: SHIPPED | OUTPATIENT
Start: 2018-01-01 | End: 2018-01-01

## 2018-01-01 RX ORDER — AZITHROMYCIN 250 MG/1
TABLET, FILM COATED ORAL
Qty: 6 TABLET | Refills: 0 | Status: SHIPPED | OUTPATIENT
Start: 2018-01-01 | End: 2018-01-01

## 2018-01-01 RX ORDER — CEFTRIAXONE SODIUM 1 G/50ML
1 INJECTION, SOLUTION INTRAVENOUS ONCE
Status: COMPLETED | OUTPATIENT
Start: 2018-01-01 | End: 2018-01-01

## 2018-01-01 RX ORDER — ONDANSETRON 4 MG/1
4 TABLET, ORALLY DISINTEGRATING ORAL EVERY 6 HOURS PRN
Status: DISCONTINUED | OUTPATIENT
Start: 2018-01-01 | End: 2018-01-01 | Stop reason: HOSPADM

## 2018-01-01 RX ORDER — POLYETHYLENE GLYCOL 3350 17 G/17G
POWDER, FOR SOLUTION ORAL
Qty: 527 G | Refills: 11 | Status: SHIPPED | OUTPATIENT
Start: 2018-01-01 | End: 2018-01-01

## 2018-01-01 RX ORDER — SENNOSIDES A AND B 8.6 MG/1
2 TABLET, FILM COATED ORAL 3 TIMES DAILY
Qty: 120 TABLET | Refills: 11 | Status: SHIPPED | OUTPATIENT
Start: 2018-01-01 | End: 2018-01-01

## 2018-01-01 RX ORDER — CIPROFLOXACIN 250 MG/1
250 TABLET, FILM COATED ORAL 2 TIMES DAILY
Qty: 14 TABLET | Refills: 0 | Status: SHIPPED | OUTPATIENT
Start: 2018-01-01 | End: 2018-01-01

## 2018-01-01 RX ORDER — CEFTRIAXONE SODIUM 1 G/50ML
1 INJECTION, SOLUTION INTRAVENOUS EVERY 24 HOURS
Status: DISCONTINUED | OUTPATIENT
Start: 2018-01-01 | End: 2018-01-01 | Stop reason: HOSPADM

## 2018-01-01 RX ORDER — SULFAMETHOXAZOLE/TRIMETHOPRIM 800-160 MG
1 TABLET ORAL 2 TIMES DAILY
Qty: 6 TABLET | Refills: 0
Start: 2018-01-01 | End: 2018-01-01

## 2018-01-01 RX ORDER — PROCHLORPERAZINE 25 MG
12.5 SUPPOSITORY, RECTAL RECTAL EVERY 12 HOURS PRN
Status: DISCONTINUED | OUTPATIENT
Start: 2018-01-01 | End: 2018-01-01 | Stop reason: HOSPADM

## 2018-01-01 RX ORDER — LORAZEPAM 0.5 MG/1
0.25 TABLET ORAL EVERY 8 HOURS PRN
Qty: 20 TABLET | Refills: 0
Start: 2018-01-01 | End: 2018-01-01 | Stop reason: ALTCHOICE

## 2018-01-01 RX ORDER — LANOLIN ALCOHOL/MO/W.PET/CERES
1000 CREAM (GRAM) TOPICAL DAILY
Qty: 31 TABLET | Refills: 11 | Status: SHIPPED | OUTPATIENT
Start: 2018-01-01 | End: 2018-01-01

## 2018-01-01 RX ORDER — SENNOSIDES A AND B 8.6 MG/1
2 TABLET, FILM COATED ORAL DAILY
Qty: 120 TABLET | Refills: 11
Start: 2018-01-01 | End: 2018-01-01

## 2018-01-01 RX ORDER — PROCHLORPERAZINE MALEATE 5 MG
5 TABLET ORAL EVERY 6 HOURS PRN
Status: DISCONTINUED | OUTPATIENT
Start: 2018-01-01 | End: 2018-01-01 | Stop reason: HOSPADM

## 2018-01-01 RX ORDER — ONDANSETRON 2 MG/ML
4 INJECTION INTRAMUSCULAR; INTRAVENOUS EVERY 6 HOURS PRN
Status: DISCONTINUED | OUTPATIENT
Start: 2018-01-01 | End: 2018-01-01 | Stop reason: HOSPADM

## 2018-01-01 RX ORDER — SIMVASTATIN 10 MG
10 TABLET ORAL AT BEDTIME
Status: DISCONTINUED | OUTPATIENT
Start: 2018-01-01 | End: 2018-01-01 | Stop reason: HOSPADM

## 2018-01-01 RX ORDER — CEPHALEXIN 500 MG/1
500 CAPSULE ORAL 2 TIMES DAILY
Qty: 6 CAPSULE | Refills: 0 | Status: SHIPPED | OUTPATIENT
Start: 2018-01-01 | End: 2018-01-01

## 2018-01-01 RX ORDER — IBUPROFEN 800 MG/1
TABLET, FILM COATED ORAL
Qty: 90 TABLET | Refills: 11 | Status: SHIPPED | OUTPATIENT
Start: 2018-01-01 | End: 2018-01-01 | Stop reason: ALTCHOICE

## 2018-01-01 RX ORDER — MULTIVITAMIN WITH IRON
TABLET ORAL
Qty: 31 TABLET | Refills: 11 | Status: SHIPPED | OUTPATIENT
Start: 2018-01-01 | End: 2018-01-01

## 2018-01-01 RX ORDER — MORPHINE SULFATE 30 MG/1
5 TABLET ORAL EVERY 8 HOURS
Qty: 90 TABLET | Refills: 0 | Status: SHIPPED | OUTPATIENT
Start: 2018-01-01

## 2018-01-01 RX ORDER — CETIRIZINE HYDROCHLORIDE 10 MG/1
TABLET ORAL
Qty: 31 TABLET | Refills: 11 | Status: SHIPPED | OUTPATIENT
Start: 2018-01-01 | End: 2018-01-01

## 2018-01-01 RX ORDER — LEVOTHYROXINE SODIUM 75 UG/1
75 TABLET ORAL DAILY
Status: DISCONTINUED | OUTPATIENT
Start: 2018-01-01 | End: 2018-01-01 | Stop reason: HOSPADM

## 2018-01-01 RX ORDER — SULFAMETHOXAZOLE/TRIMETHOPRIM 800-160 MG
1 TABLET ORAL 2 TIMES DAILY
Qty: 6 TABLET | Refills: 0 | Status: SHIPPED | OUTPATIENT
Start: 2018-01-01 | End: 2018-01-01

## 2018-01-01 RX ORDER — SIMVASTATIN 10 MG
10 TABLET ORAL AT BEDTIME
Qty: 31 TABLET | Refills: 11 | Status: SHIPPED | OUTPATIENT
Start: 2018-01-01 | End: 2018-01-01

## 2018-01-01 RX ORDER — CETIRIZINE HYDROCHLORIDE 10 MG/1
10 TABLET ORAL DAILY PRN
Qty: 30 TABLET | Refills: 11
Start: 2018-01-01 | End: 2018-01-01 | Stop reason: ALTCHOICE

## 2018-01-01 RX ORDER — RALOXIFENE HYDROCHLORIDE 60 MG/1
TABLET, FILM COATED ORAL
Refills: 11 | OUTPATIENT
Start: 2018-01-01

## 2018-01-01 RX ORDER — NALOXONE HYDROCHLORIDE 0.4 MG/ML
.1-.4 INJECTION, SOLUTION INTRAMUSCULAR; INTRAVENOUS; SUBCUTANEOUS
Status: DISCONTINUED | OUTPATIENT
Start: 2018-01-01 | End: 2018-01-01 | Stop reason: HOSPADM

## 2018-01-01 RX ADMIN — IBUPROFEN 600 MG: 600 TABLET ORAL at 11:42

## 2018-01-01 RX ADMIN — IBUPROFEN 600 MG: 600 TABLET ORAL at 21:01

## 2018-01-01 RX ADMIN — SIMVASTATIN 10 MG: 10 TABLET, FILM COATED ORAL at 20:52

## 2018-01-01 RX ADMIN — CEFTRIAXONE SODIUM 1 G: 1 INJECTION, SOLUTION INTRAVENOUS at 22:42

## 2018-01-01 RX ADMIN — CEFTRIAXONE SODIUM 1 G: 1 INJECTION, SOLUTION INTRAVENOUS at 00:59

## 2018-01-01 RX ADMIN — SODIUM CHLORIDE 1000 ML: 9 INJECTION, SOLUTION INTRAVENOUS at 05:56

## 2018-01-01 RX ADMIN — CEFTRIAXONE SODIUM 1 G: 1 INJECTION, SOLUTION INTRAVENOUS at 23:10

## 2018-01-01 RX ADMIN — ASPIRIN 81 MG 81 MG: 81 TABLET ORAL at 09:34

## 2018-01-01 RX ADMIN — IBUPROFEN 600 MG: 600 TABLET ORAL at 08:37

## 2018-01-01 RX ADMIN — CETIRIZINE HYDROCHLORIDE 10 MG: 10 TABLET, FILM COATED ORAL at 11:58

## 2018-01-01 RX ADMIN — LEVOTHYROXINE SODIUM 75 MCG: 75 TABLET ORAL at 05:28

## 2018-01-01 RX ADMIN — LEVOTHYROXINE SODIUM 75 MCG: 75 TABLET ORAL at 05:54

## 2018-01-01 RX ADMIN — CETIRIZINE HYDROCHLORIDE 10 MG: 10 TABLET, FILM COATED ORAL at 08:12

## 2018-01-01 RX ADMIN — ASPIRIN 81 MG 81 MG: 81 TABLET ORAL at 08:12

## 2018-01-01 RX ADMIN — IBUPROFEN 600 MG: 600 TABLET ORAL at 21:34

## 2018-01-01 RX ADMIN — SIMVASTATIN 10 MG: 10 TABLET, FILM COATED ORAL at 21:24

## 2018-01-01 RX ADMIN — IBUPROFEN 600 MG: 600 TABLET ORAL at 05:27

## 2018-01-01 RX ADMIN — SODIUM CHLORIDE 1000 ML: 9 INJECTION, SOLUTION INTRAVENOUS at 23:04

## 2018-01-01 RX ADMIN — ASPIRIN 81 MG 81 MG: 81 TABLET ORAL at 08:38

## 2018-01-01 RX ADMIN — RANITIDINE 75 MG: 75 TABLET, FILM COATED ORAL at 08:15

## 2018-01-01 RX ADMIN — RANITIDINE 75 MG: 75 TABLET, FILM COATED ORAL at 12:01

## 2018-01-01 RX ADMIN — LEVOTHYROXINE SODIUM 75 MCG: 75 TABLET ORAL at 09:34

## 2018-01-01 ASSESSMENT — ACTIVITIES OF DAILY LIVING (ADL)
ADLS_ACUITY_SCORE: 36
ADLS_ACUITY_SCORE: 40
WHICH_OF_THE_ABOVE_FUNCTIONAL_RISKS_HAD_A_RECENT_ONSET_OR_CHANGE?: AMBULATION;TRANSFERRING;TOILETING;BATHING;DRESSING;EATING;COGNITION;COMMUNICATION/SPEECH
ADLS_ACUITY_SCORE: 40
DEPENDENT_IADLS:: CLEANING;COOKING;LAUNDRY;SHOPPING;MEAL PREPARATION;MEDICATION MANAGEMENT;MONEY MANAGEMENT;TRANSPORTATION
ADLS_ACUITY_SCORE: 40
ADLS_ACUITY_SCORE: 42
ADLS_ACUITY_SCORE: 40
ADLS_ACUITY_SCORE: 21
ADLS_ACUITY_SCORE: 21
ADLS_ACUITY_SCORE: 40

## 2018-02-13 NOTE — TELEPHONE ENCOUNTER
Reason for Call:  Other     Detailed comments: Pt has appt 03/08 for dark spot under fingernail. 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 Wondering if pt should be seen sooner - Please advise    Phone Number Patient can be reached at: Home number on file [phone] (home)    Best Time: Any    Can we leave a detailed message on this number? YES    Call taken on 2/13/2018 at 11:24 AM by Denise Behrendt

## 2018-02-13 NOTE — TELEPHONE ENCOUNTER
Spoke to Daughter, Arelis. (Consent to communicate on file). History of Melanoma er chart review. Given work in appointment tomorrow. Enedina Craven RN

## 2018-02-14 NOTE — MR AVS SNAPSHOT
"              After Visit Summary   2018    Rhianna Gamboa    MRN: [id number]           Patient Information     Date Of Birth          1933        Visit Information        Provider Department      2018 1:00 PM Reji Keyes MD [facility]        Today's Diagnoses     Lentigo    -  1    SK (seborrheic keratosis)        History of melanoma        History of skin cancer           Follow-ups after your visit        Who to contact     If you have questions or need follow up information about today's clinic visit or your schedule please contact [facility] directly at [phone].  Normal or non-critical lab and imaging results will be communicated to you by [employer], letter or phone within 4 business days after the clinic has received the results. If you do not hear from us within 7 days, please contact the clinic through [employer] or phone. If you have a critical or abnormal lab result, we will notify you by phone as soon as possible.  Submit refill requests through [employer] or call your pharmacy and they will forward the refill request to us. Please allow 3 business days for your refill to be completed.          Additional Information About Your Visit        MyChart Information     [employer] lets you send messages to your doctor, view your test results, renew your prescriptions, schedule appointments and more. To sign up, go to [email or web address] . Click on \"Log in\" on the left side of the screen, which will take you to the Welcome page. Then click on \"Sign up Now\" on the right side of the page.     You will be asked to enter the access code listed below, as well as some personal information. Please follow the directions to create your username and password.     Your access code is: [email or web address]  Expires: 5/15/2018  1:16 PM     Your access code will  in 90 days. If you need help or a new code, please call your [facility] or [phone].        Care " EveryWhere ID     This is your Care EveryWhere ID. This could be used by other organizations to access your [place] medical records  [id number]        Your Vitals Were     Pulse Pulse Oximetry                87 94%           Blood Pressure from Last 3 Encounters:   02/14/18 107/59   09/25/17 120/66   06/29/17 135/69    Weight from Last 3 Encounters:   05/02/17 67.8 kg (149 lb 6.4 oz)   04/07/17 66.9 kg (147 lb 6.4 oz)   02/15/17 68.9 kg (152 lb)              Today, you had the following     No orders found for display       Primary Care Provider Office Phone # Fax #    Omar Olivo [phone] [address] [address]        Equal Access to Services     SARAH DAVIDSON : Hadii aad ku hadasho Soomaali, waaxda luqadaha, qaybta kaalmada adeegyada, walter altman . So [facility] [phone].    ATENCIÓN: Si habla español, tiene a alexander disposición servicios gratuitos de asistencia lingüística. Llame al [phone].    We comply with applicable federal civil rights laws and [place] laws. We do not discriminate on the basis of race, color, national origin, age, disability, sex, sexual orientation, or gender identity.            Thank you!     Thank you for choosing [facility]  for your care. Our goal is always to provide you with excellent care. Hearing back from our patients is one way we can continue to improve our services. Please take a few minutes to complete the written survey that you may receive in the mail after your visit with us. Thank you!             Your Updated Medication List - Protect others around you: Learn how to safely use, store and throw away your medicines at www.disposemymeds.org.          This list is accurate as of 2/14/18  1:16 PM.  Always use your most recent med list.                   Brand Name Dispense Instructions for use Diagnosis    ALEVE 220 MG capsule   Generic drug:  naproxen sodium      Take 220 mg by mouth  2 times daily (with meals). PRN        aspirin 81 MG tablet     100    1 TABLET BY MOUTH DAILY    Other and unspecified hyperlipidemia       CALCIUM 600 + D 600-200 MG-UNIT Tabs     3 MONTHS    Reported on 5/2/2017    Osteoporosis, unspecified       cetirizine 10 MG tablet    zyrTEC    90 tablet    Take 1 tablet (10 mg) by mouth daily    Allergic rhinitis       cyanocobalamin 1000 MCG tablet    vitamin  B-12    100 tablet    Take 1,000 mcg by mouth daily Reported on 5/2/2017    Routine general medical examination at a health care facility       HYDROcodone-acetaminophen 5-325 MG per tablet    NORCO    20 tablet    Take 1 tablet by mouth every 6 hours as needed for moderate to severe pain maximum 4 tablet(s) per day    Sciatica of left side       ibuprofen 800 MG tablet    ADVIL/MOTRIN     Take 800 mg by mouth every 8 hours as needed for moderate pain        levothyroxine 75 MCG tablet    SYNTHROID    90 tablet    Take 1 tablet (75 mcg) by mouth daily    Hypothyroidism, unspecified type       Multiple vitamin Tabs      Reported on 5/2/2017    Other specified pre-operative examination       raloxifene 60 MG tablet    EVISTA    90 tablet    Take 1 tablet (60 mg) by mouth daily Hold on file until needed    Osteoporosis       RANITIDINE 75 PO      Take 1 tablet by mouth daily Reported on 5/2/2017        simvastatin 10 MG tablet    ZOCOR    90 tablet    Take 1 tablet (10 mg) by mouth At Bedtime    Hyperlipidemia LDL goal <130       VITAMIN B6 PO      Take 100 mg by mouth daily

## 2018-02-14 NOTE — NURSING NOTE
"Initial /59  Pulse 87  SpO2 94% Estimated body mass index is 27.33 kg/(m^2) as calculated from the following:    Height as of 5/2/17: 1.575 m (5' 2\").    Weight as of 5/2/17: 67.8 kg (149 lb 6.4 oz). .      "

## 2018-02-14 NOTE — LETTER
2/14/2018         RE: Rhianna Gamboa  [address]   [place] [address]        Dear Colleague,    Thank you for referring your patient, Rhianna Gamboa, to the [facility]. Please see a copy of my visit note below.    Rhianna Gamboa is a 84 year old year old female patient here today for f/u hx of non-melanoma skin cancer and melanoma.  She has no concerns today.  Patient reports the following previous treatments none.  Patient reports the following modifying factors none.  Associated symptoms: none.  Patient has no other skin complaints today.  Remainder of the HPI, Meds, PMH, Allergies, FH, and SH was reviewed in chart.      Past Medical History:   Diagnosis Date     Basal cell carcinoma      Malignant melanoma (H)        Past Surgical History:   Procedure Laterality Date     AMPUTATE FINGER(S) Right 1/20/2016    Procedure: AMPUTATE FINGER(S);  Surgeon: Brandon Rollins MD;  Location: [facility] OR     BIOPSY NODE SENTINEL N/A 1/20/2016    Procedure: BIOPSY NODE SENTINEL;  Surgeon: Brandon Rollins MD;  Location:  OR     EYE SURGERY  7/2009    right cataract     EYE SURGERY  2010    both eyes     RELEASE CARPAL TUNNEL Right 5/12/2015    Procedure: RELEASE CARPAL TUNNEL;  Surgeon: Omar Balderrama MD;  Location: [facility] OR        Family History   Problem Relation Age of Onset     C.A.D. Mother      C.A.D. Father      CANCER Maternal Grandmother      bowel cancer     C.A.D. Maternal Grandfather      DIABETES Sister      CEREBROVASCULAR DISEASE Sister      HEART DISEASE Sister      HEART DISEASE Sister      HEART DISEASE Son      Breast Cancer Daughter        Social History     Social History     Marital status:      Spouse name: N/A     Number of children: N/A     Years of education: N/A     Occupational History     Not on file.     Social History Main Topics     Smoking status: Never Smoker     Smokeless tobacco: Never Used     Alcohol use No     Drug use: No     Sexual activity: Not  Currently     Other Topics Concern     Parent/Sibling W/ Cabg, Mi Or Angioplasty Before 65f 55m? Yes     son MI in his 50s     Social History Narrative       Outpatient Encounter Prescriptions as of 2/14/2018   Medication Sig Dispense Refill     HYDROcodone-acetaminophen (NORCO) 5-325 MG per tablet Take 1 tablet by mouth every 6 hours as needed for moderate to severe pain maximum 4 tablet(s) per day 20 tablet 0     ibuprofen (ADVIL/MOTRIN) 800 MG tablet Take 800 mg by mouth every 8 hours as needed for moderate pain       Pyridoxine HCl (VITAMIN B6 PO) Take 100 mg by mouth daily       levothyroxine (SYNTHROID) 75 MCG tablet Take 1 tablet (75 mcg) by mouth daily 90 tablet 3     raloxifene (EVISTA) 60 MG tablet Take 1 tablet (60 mg) by mouth daily Hold on file until needed 90 tablet 3     simvastatin (ZOCOR) 10 MG tablet Take 1 tablet (10 mg) by mouth At Bedtime 90 tablet 3     cetirizine (ZYRTEC) 10 MG tablet Take 1 tablet (10 mg) by mouth daily 90 tablet 3     Ranitidine HCl (RANITIDINE 75 PO) Take 1 tablet by mouth daily Reported on 5/2/2017       Naproxen Sodium (ALEVE) 220 MG capsule Take 220 mg by mouth 2 times daily (with meals). PRN       cyanocolbalamin (VITAMIN B-12) 1000 MCG tablet Take 1,000 mcg by mouth daily Reported on 5/2/2017 100 tablet 3     ASPIRIN 81 MG OR TABS 1 TABLET BY MOUTH DAILY 100 3     CALCIUM 600 + D 600-200 MG-UNIT OR TABS Reported on 5/2/2017 3 MONTHS 1 YEAR     MULTIPLE VITAMIN OR TABS Reported on 5/2/2017  0     No facility-administered encounter medications on file as of 2/14/2018.              Review Of Systems  Skin: As above  Eyes: negative  Ears/Nose/Throat: negative  Respiratory: No shortness of breath, dyspnea on exertion, cough, or hemoptysis  Cardiovascular: negative  Gastrointestinal: negative  Genitourinary: negative  Musculoskeletal: negative  Neurologic: negative  Psychiatric: negative  Hematologic/Lymphatic/Immunologic: negative  Endocrine: negative      O:   NAD, WDWN,  Alert & Oriented, Mood & Affect wnl, Vitals stable   Here today alone   /59  Pulse 87  SpO2 94%   General appearance normal   Vitals stable   Alert, oriented and in no acute distress      Following lymph nodes palpated: Occipital, Cervical, Supraclavicular , axilla no lad    Stuck on papules and brown macules on trunk and ext    Red papules on trunk         The remainder of the full exam was unremarkable; the following areas were examined:  conjunctiva/lids, oral mucosa, neck, peripheral vascular system, abdomen, lymph nodes, digits/nails, eccrine and apocrine glands, scalp/hair, face, neck, chest, abdomen, buttocks, back, RUE, LUE, RLE, LLE       Eyes: Conjunctivae/lids:Normal     ENT: Lips, buccal mucosa, tongue: normal    MSK:Normal    Cardiovascular: peripheral edema none    Pulm: Breathing Normal    Lymph Nodes: No Head and Neck Lymphadenopathy     Neuro/Psych: Orientation:Normal; Mood/Affect:Normal      A/P:  1.hx of melanoma, seborrheic keratosis, letnigo, hx of non-melanoma skin cancer, angioma  MELANOMA DISCUSSED WITH PATIENT:  I discussed the specifics of tumor, prognosis, metachronous melanoma, self exam, and genetics with the patient. I explained the need for monthly skin exams including and taught the patient how to do this. Patient was asked about new or changing moles and a full skin exam was performed.   BENIGN LESIONS DISCUSSED WITH PATIENT:  I discussed the specifics of tumor, prognosis, and genetics of benign lesions.  I explained that treatment of these lesions would be purely cosmetic and not medically neccessary.  I discussed with patient different removal options including excision, cautery and /or laser.      Nature and genetics of benign skin lesions dicussed with patient.  Signs and Symptoms of skin cancer discussed with patient.  ABCDEs of melanoma reviewed with patient.  Patient encouraged to perform monthly skin exams.  UV precautions reviewed with patient.  Skin care regimen  reviewed with patient: Eliminate harsh soaps, i.e. Dial, zest, irsih spring; Mild soaps such as Cetaphil or Dove sensitive skin, avoid hot or cold showers, aggressive use of emollients including vanicream, cetaphil or cerave discussed with patient.    Risks of non-melanoma skin cancer discussed with patient   Return to clinic 6-12 months      Again, thank you for allowing me to participate in the care of your patient.        Sincerely,        Reji Keyes MD

## 2018-02-14 NOTE — PROGRESS NOTES
Rhianna Gamboa is a 84 year old year old female patient here today for f/u hx of non-melanoma skin cancer and melanoma.  She has no concerns today.  Patient reports the following previous treatments none.  Patient reports the following modifying factors none.  Associated symptoms: none.  Patient has no other skin complaints today.  Remainder of the HPI, Meds, PMH, Allergies, FH, and SH was reviewed in chart.      Past Medical History:   Diagnosis Date     Basal cell carcinoma      Malignant melanoma (H)        Past Surgical History:   Procedure Laterality Date     AMPUTATE FINGER(S) Right 1/20/2016    Procedure: AMPUTATE FINGER(S);  Surgeon: Brandon Rolilns MD;  Location: [facility] OR     BIOPSY NODE SENTINEL N/A 1/20/2016    Procedure: BIOPSY NODE SENTINEL;  Surgeon: Brandon Rollins MD;  Location: [facility] OR     EYE SURGERY  7/2009    right cataract     EYE SURGERY  2010    both eyes     RELEASE CARPAL TUNNEL Right 5/12/2015    Procedure: RELEASE CARPAL TUNNEL;  Surgeon: Omar Balderrama MD;  Location: [facility] OR        Family History   Problem Relation Age of Onset     C.A.D. Mother      C.A.D. Father      CANCER Maternal Grandmother      bowel cancer     C.A.D. Maternal Grandfather      DIABETES Sister      CEREBROVASCULAR DISEASE Sister      HEART DISEASE Sister      HEART DISEASE Sister      HEART DISEASE Son      Breast Cancer Daughter        Social History     Social History     Marital status:      Spouse name: N/A     Number of children: N/A     Years of education: N/A     Occupational History     Not on file.     Social History Main Topics     Smoking status: Never Smoker     Smokeless tobacco: Never Used     Alcohol use No     Drug use: No     Sexual activity: Not Currently     Other Topics Concern     Parent/Sibling W/ Cabg, Mi Or Angioplasty Before 65f 55m? Yes     son MI in his 50s     Social History Narrative       Outpatient Encounter Prescriptions as of 2/14/2018   Medication Sig Dispense Refill      HYDROcodone-acetaminophen (NORCO) 5-325 MG per tablet Take 1 tablet by mouth every 6 hours as needed for moderate to severe pain maximum 4 tablet(s) per day 20 tablet 0     ibuprofen (ADVIL/MOTRIN) 800 MG tablet Take 800 mg by mouth every 8 hours as needed for moderate pain       Pyridoxine HCl (VITAMIN B6 PO) Take 100 mg by mouth daily       levothyroxine (SYNTHROID) 75 MCG tablet Take 1 tablet (75 mcg) by mouth daily 90 tablet 3     raloxifene (EVISTA) 60 MG tablet Take 1 tablet (60 mg) by mouth daily Hold on file until needed 90 tablet 3     simvastatin (ZOCOR) 10 MG tablet Take 1 tablet (10 mg) by mouth At Bedtime 90 tablet 3     cetirizine (ZYRTEC) 10 MG tablet Take 1 tablet (10 mg) by mouth daily 90 tablet 3     Ranitidine HCl (RANITIDINE 75 PO) Take 1 tablet by mouth daily Reported on 5/2/2017       Naproxen Sodium (ALEVE) 220 MG capsule Take 220 mg by mouth 2 times daily (with meals). PRN       cyanocolbalamin (VITAMIN B-12) 1000 MCG tablet Take 1,000 mcg by mouth daily Reported on 5/2/2017 100 tablet 3     ASPIRIN 81 MG OR TABS 1 TABLET BY MOUTH DAILY 100 3     CALCIUM 600 + D 600-200 MG-UNIT OR TABS Reported on 5/2/2017 3 MONTHS 1 YEAR     MULTIPLE VITAMIN OR TABS Reported on 5/2/2017  0     No facility-administered encounter medications on file as of 2/14/2018.              Review Of Systems  Skin: As above  Eyes: negative  Ears/Nose/Throat: negative  Respiratory: No shortness of breath, dyspnea on exertion, cough, or hemoptysis  Cardiovascular: negative  Gastrointestinal: negative  Genitourinary: negative  Musculoskeletal: negative  Neurologic: negative  Psychiatric: negative  Hematologic/Lymphatic/Immunologic: negative  Endocrine: negative      O:   NAD, WDWN, Alert & Oriented, Mood & Affect wnl, Vitals stable   Here today alone   /59  Pulse 87  SpO2 94%   General appearance normal   Vitals stable   Alert, oriented and in no acute distress      Following lymph nodes palpated: Occipital,  Cervical, Supraclavicular , axilla no lad    Stuck on papules and brown macules on trunk and ext    Red papules on trunk         The remainder of the full exam was unremarkable; the following areas were examined:  conjunctiva/lids, oral mucosa, neck, peripheral vascular system, abdomen, lymph nodes, digits/nails, eccrine and apocrine glands, scalp/hair, face, neck, chest, abdomen, buttocks, back, RUE, LUE, RLE, LLE       Eyes: Conjunctivae/lids:Normal     ENT: Lips, buccal mucosa, tongue: normal    MSK:Normal    Cardiovascular: peripheral edema none    Pulm: Breathing Normal    Lymph Nodes: No Head and Neck Lymphadenopathy     Neuro/Psych: Orientation:Normal; Mood/Affect:Normal      A/P:  1.hx of melanoma, seborrheic keratosis, letnigo, hx of non-melanoma skin cancer, angioma  MELANOMA DISCUSSED WITH PATIENT:  I discussed the specifics of tumor, prognosis, metachronous melanoma, self exam, and genetics with the patient. I explained the need for monthly skin exams including and taught the patient how to do this. Patient was asked about new or changing moles and a full skin exam was performed.   BENIGN LESIONS DISCUSSED WITH PATIENT:  I discussed the specifics of tumor, prognosis, and genetics of benign lesions.  I explained that treatment of these lesions would be purely cosmetic and not medically neccessary.  I discussed with patient different removal options including excision, cautery and /or laser.      Nature and genetics of benign skin lesions dicussed with patient.  Signs and Symptoms of skin cancer discussed with patient.  ABCDEs of melanoma reviewed with patient.  Patient encouraged to perform monthly skin exams.  UV precautions reviewed with patient.  Skin care regimen reviewed with patient: Eliminate harsh soaps, i.e. Dial, zest, irsih spring; Mild soaps such as Cetaphil or Dove sensitive skin, avoid hot or cold showers, aggressive use of emollients including vanicream, cetaphil or cerave discussed with  patient.    Risks of non-melanoma skin cancer discussed with patient   Return to clinic 6-12 months

## 2018-04-24 PROBLEM — F02.80 ALZHEIMER'S DEMENTIA WITHOUT BEHAVIORAL DISTURBANCE, UNSPECIFIED TIMING OF DEMENTIA ONSET: Status: ACTIVE | Noted: 2018-01-01

## 2018-04-24 PROBLEM — G30.9 ALZHEIMER'S DEMENTIA WITHOUT BEHAVIORAL DISTURBANCE, UNSPECIFIED TIMING OF DEMENTIA ONSET: Status: ACTIVE | Noted: 2018-01-01

## 2018-04-24 NOTE — NURSING NOTE
"Chief Complaint   Patient presents with     UTI       Initial /71 (BP Location: Right arm, Patient Position: Chair, Cuff Size: Adult Regular)  Pulse 83  Temp 98.7  F (37.1  C) (Tympanic)  Resp 20  Ht 5' 2.5\" (1.588 m)  Wt 163 lb 6.4 oz (74.1 kg)  BMI 29.41 kg/m2 Estimated body mass index is 29.41 kg/(m^2) as calculated from the following:    Height as of this encounter: 5' 2.5\" (1.588 m).    Weight as of this encounter: 163 lb 6.4 oz (74.1 kg).  Medication Reconciliation: complete  "

## 2018-04-24 NOTE — PATIENT INSTRUCTIONS
Possible pelvic floor weakness causing some difficulty controlling the urinary sphincter.  Schedule gynecology consult.  Use adult diaper (depends), mostly at night, but may use during the day if with daytime incontinence as well.        Thank you for choosing [facility].  You may be receiving a survey in the mail from Alex Avendaño regarding your visit today.  Please take a few minutes to complete and return the survey to let us know how we are doing.      If you have questions or concerns, please contact us via mention or you can contact your care team at [phone].    Our Clinic hours are:  Monday 6:40 am  to 7:00 pm  Tuesday -Friday 6:40 am to 5:00 pm    The [place] outpatient lab hours are:  Monday - Friday 6:10 am to 4:45 pm  Saturdays 7:00 am to 11:00 am  Appointments are required, call [phone]    If you have clinical questions after hours or would like to schedule an appointment,  call the clinic at [phone].    Urinary Incontinence, Female (Adult)  Urinary incontinence means loss of control of the bladder. This problem affects many women, especially as they get older. If you have incontinence, you may be embarrassed to ask for help. But know that this problem can be treated.  Types of Incontinence  There are different types of incontinence. Two of the main types are described here. You can have more than one type.    Stress incontinence. With this type, urine leaks when pressure (stress) is put on the bladder. This may happen when you cough, sneeze, or laugh. Stress incontinence most often occurs because the pelvic floor muscles that support the bladder and urethra are weak. This can happen after pregnancy and vaginal childbirth or a hysterectomy. It can also be due to excess body weight or hormone changes.    Urge incontinence (also called overactive bladder). With this type, a sudden urge to urinate is felt often. This may happen even though there may not be much urine in the bladder. The  need to urinate often during the night is common. Urge incontinence most often occurs because of bladder spasms. This may be due to bladder irritation or infection. Damage to bladder nerves or pelvic muscles, constipation, and certain medicines can also lead to urge incontinence.  Treatment of urinary incontinence depends on the cause. Further evaluation is needed to find the type you have. This will likely include an exam and certain tests. Based on the results, you and your healthcare provider can then plan treatment. Until a diagnosis is made, the home care tips below can help relieve symptoms.  Home care    Do pelvic floor muscle exercises, if they are prescribed. The pelvic floor muscles help support the bladder and urethra. Many women find that their symptoms improve when doing special exercises that strengthen these muscles. To do the exercises contract the muscles you would use to stop your stream of urine, but do this when you re not urinating. Hold for 10 seconds, then relax. Repeat 10 to 20 times in a row, at least 3 times a day. Your provider may give you other instructions for how to do the exercises and how often.    Keep a bladder diary. This helps track how often and how much you urinate over a set period of time. Bring this diary with you to your next visit with the provider. The information can help your provider learn more about your bladder problem.    Lose weight, if advised to by your provider. Excess weight puts pressure on the bladder. Your provider can help you create a weight-loss plan that s right for you. This may include exercising more and making certain diet changes.    Don't consume foods and drinks that may irritate the bladder. These can include alcohol and caffeinated drinks.    Quit smoking. Smoking and other tobacco use can lead to chronic cough that strains the pelvic floor muscles. Smoking may also damage the bladder and urethra. Talk with your provider about treatments or  methods you can use to quit smoking.    If drinking large amounts of fluid causes you to have symptoms, you may be advised to limit your fluid intake. You may also be advised to drink most of your fluids during the day and to limit fluids at night.    If you re worried about urine leakage or accidents, you may wear absorbent pads to catch urine. Change the pads often. This helps reduce discomfort. It may also reduce the risk of skin or bladder infections.  Follow-up care  Follow up with your healthcare provider, or as directed. It may take some to find the right treatment for your problem. Your treatment plan may include special therapies or medicines. Certain procedures or surgery may also be options. Be sure to discuss any questions you have with your provider.  When to seek medical advice  Call the healthcare provider right away if any of these occur:    Fever of 100.4 F (38 C) or higher, or as directed by your provider    Bladder pain or fullness    Abdominal swelling    Nausea or vomiting    Back pain    Weakness, dizziness or fainting  Date Last Reviewed: 10/1/2017    2310-6881 [employer]. [address] [place] [address], [place] [address]. All rights reserved. This information is not intended as a substitute for professional medical care. Always follow your healthcare professional's instructions.

## 2018-04-24 NOTE — PROGRESS NOTES
"  SUBJECTIVE:   Rhianna Gamboa is a 84 year old female who presents to clinic today for the following health issues:      URINARY TRACT SYMPTOMS  Onset: 2-4 months, incontinence at night    Description:   Painful urination (Dysuria): no   Blood in urine (Hematuria): no   Delay in urine (Hesitency): no   Patient is a poor historian due to Alzheimer's dementia, but did say she commonly voids before bed, and has had to wake up once on some nights to void.  Patient denies remembering feeling urge to void and not make it to the bathroom.  Son said he noticed that the apartment smelled of urine a few times but not all the time.  The senior care visiting staff mentioned to son that she found \"feces in the trash can\" a few times in the last few months.  Patient is awaiting placement in an assisted living facility.    Intensity: mild    Progression of Symptoms:  Worsening, occurring more often    Accompanying Signs & Symptoms:  Fever/chills: no   Flank pain no   Nausea and vomiting: no   Any vaginal symptoms: none  Abdominal/Pelvic Pain: no     History:   History of frequent UTI's: pt not sure  History of kidney stones: no   Sexually Active: no   Possibility of pregnancy: No    Precipitating factors:   none    Therapies Tried and outcome: none tried    Verified above history with patient.      Problem list and histories reviewed & adjusted, as indicated.  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)     Alzheimer's dementia without behavioral disturbance, unspecified timing of dementia onset     Past Surgical History:   Procedure Laterality Date     AMPUTATE FINGER(S) Right 1/20/2016    Procedure: AMPUTATE FINGER(S);  Surgeon: Brandon Rollins MD;  Location: [facility] OR     BIOPSY " NODE SENTINEL N/A 1/20/2016    Procedure: BIOPSY NODE SENTINEL;  Surgeon: Brandon Rollins MD;  Location: [facility] OR     EYE SURGERY  7/2009    right cataract     EYE SURGERY  2010    both eyes     RELEASE CARPAL TUNNEL Right 5/12/2015    Procedure: RELEASE CARPAL TUNNEL;  Surgeon: Omar Balderrama MD;  Location: [facility] OR       Social History   Substance Use Topics     Smoking status: Never Smoker     Smokeless tobacco: Never Used     Alcohol use No     Family History   Problem Relation Age of Onset     C.A.D. Mother      C.A.D. Father      CANCER Maternal Grandmother      bowel cancer     C.A.D. Maternal Grandfather      DIABETES Sister      CEREBROVASCULAR DISEASE Sister      HEART DISEASE Sister      HEART DISEASE Sister      HEART DISEASE Son      Breast Cancer Daughter          Current Outpatient Prescriptions   Medication Sig Dispense Refill     ASPIRIN 81 MG OR TABS 1 TABLET BY MOUTH DAILY 100 3     CALCIUM 600 + D 600-200 MG-UNIT OR TABS Reported on 5/2/2017 3 MONTHS 1 YEAR     cetirizine (ZYRTEC) 10 MG tablet Take 1 tablet (10 mg) by mouth daily 90 tablet 3     cyanocolbalamin (VITAMIN B-12) 1000 MCG tablet Take 1,000 mcg by mouth daily Reported on 5/2/2017 100 tablet 3     ibuprofen (ADVIL/MOTRIN) 800 MG tablet Take 800 mg by mouth every 8 hours as needed for moderate pain       levothyroxine (SYNTHROID) 75 MCG tablet Take 1 tablet (75 mcg) by mouth daily 90 tablet 3     MULTIPLE VITAMIN OR TABS Reported on 5/2/2017  0     Naproxen Sodium (ALEVE) 220 MG capsule Take 220 mg by mouth 2 times daily (with meals). PRN       Pyridoxine HCl (VITAMIN B6 PO) Take 100 mg by mouth daily       raloxifene (EVISTA) 60 MG tablet Take 1 tablet (60 mg) by mouth daily Hold on file until needed 90 tablet 3     simvastatin (ZOCOR) 10 MG tablet Take 1 tablet (10 mg) by mouth At Bedtime 90 tablet 3     HYDROcodone-acetaminophen (NORCO) 5-325 MG per tablet Take 1 tablet by mouth every 6 hours as needed for moderate to severe pain  "maximum 4 tablet(s) per day (Patient not taking: Reported on 4/24/2018) 20 tablet 0     Ranitidine HCl (RANITIDINE 75 PO) Take 1 tablet by mouth daily Reported on 5/2/2017       Allergies   Allergen Reactions     Codeine      Headache       Detrol [Tolterodine Tartrate] Other (See Comments)     Syncopal episode     Tylenol Itching     Other [Seasonal Allergies] Rash     Linament cream, for aches and pains and broke out where she had put it on her       Reviewed and updated as needed this visit by clinical staff  Tobacco  Allergies  Meds  Med Hx  Surg Hx  Fam Hx  Soc Hx      Reviewed and updated as needed this visit by Provider         ROS:  C: NEGATIVE for fever, chills, change in weight  I: NEGATIVE for worrisome rashes, moles or lesions  R: NEGATIVE for significant cough or SOB  CV: NEGATIVE for chest pain, palpitations or peripheral edema  GI: NEGATIVE for nausea, abdominal pain, heartburn, or change in bowel habits  :see above  H: NEGATIVE for bleeding problems    OBJECTIVE:                                                    /71 (BP Location: Right arm, Patient Position: Chair, Cuff Size: Adult Regular)  Pulse 83  Temp 98.7  F (37.1  C) (Tympanic)  Resp 20  Ht 5' 2.5\" (1.588 m)  Wt 163 lb 6.4 oz (74.1 kg)  BMI 29.41 kg/m2  Body mass index is 29.41 kg/(m^2).  GENERAL:  alert and no distress, ambulatory w/o assist  SKIN: no suspicious lesions, no rashes  BACK: no CVA tenderness either side  ABD: rounded, soft, mild suprapubic tenderness, no guarding, no mass palpable on exam  PELVIC EXAM: normal external genitalia; when the labia are , urethra visualized with no abnormality and a fleshy non-erythematous smooth protrusion visible the size of a large olive, no discharge; speculum exam done but not completed as patient complained of discomfort; no visible urethral leakage on pelvic exam.  BIMANUAL EXAM: uterus difficult to palpate due to habitus, hard mass not palpable    Diagnostic test " results:  Diagnostic Test Results:  Results for orders placed or performed in visit on 04/24/18 (from the past 24 hour(s))   *UA reflex to Microscopic and Culture ([facility] [facility] (except [place] and [place])   Result Value Ref Range    Color Urine Yellow     Appearance Urine Clear     Glucose Urine Negative NEG^Negative mg/dL    Bilirubin Urine Negative NEG^Negative    Ketones Urine Negative NEG^Negative mg/dL    Specific Gravity Urine 1.020 1.003 - 1.035    Blood Urine Negative NEG^Negative    pH Urine 5.5 5.0 - 7.0 pH    Protein Albumin Urine Negative NEG^Negative mg/dL    Urobilinogen Urine 0.2 0.2 - 1.0 EU/dL    Nitrite Urine Positive (A) NEG^Negative    Leukocyte Esterase Urine Trace (A) NEG^Negative    Source Midstream Urine    Urine Microscopic   Result Value Ref Range    WBC Urine 10-25 (A) OTO5^0 - 5 /HPF    RBC Urine O - 2 OTO2^O - 2 /HPF    WBC Clumps Present (A) NEG^Negative /HPF    Bacteria Urine Many (A) NEG^Negative /HPF        ASSESSMENT/PLAN:                                                        ICD-10-CM    1. Incontinence in female R32 *UA reflex to Microscopic and Culture ([facility] (except [place] and [place])     OB/GYN REFERRAL     Urine Microscopic     Urine Culture Aerobic Bacterial  Pelvic floor weakness? Neurogenic? Due to dementia? UTI?  Discussed with patient and son findings on physical exam; u/a not available on visit as patient unable to leave sample until after.  More likely pelvic floor weakness - mass in introitus may be cervix or part of vaginal wall.  They concurred with gyn consult.  If ua positive for cystitis, start abx. The above condition may predispose to cystitis.  Return precautions discussed and given to patient/son.     2. Alzheimer's dementia without behavioral disturbance, unspecified timing of dementia onset G30.9 While this can contribute to inability to sense need to void, patient appears to still have the capacity to decipher  body's sensations.    F02.80 Adult diaper use advised and they concurred.     3. Nonspecific finding on examination of urine R82.90 Urine Culture Aerobic Bacterial       Follow up with Provider - at GYN consult.   Patient Instructions   Possible pelvic floor weakness causing some difficulty controlling the urinary sphincter.  Schedule gynecology consult.  Use adult diaper (depends), mostly at night, but may use during the day if with daytime incontinence as well.        Thank you for choosing [facility].  You may be receiving a survey in the mail from [employer] Avenda[employer] regarding your visit today.  Please take a few minutes to complete and return the survey to let us know how we are doing.      If you have questions or concerns, please contact us via "[employer] or you can contact your care team at [phone].    Our Clinic hours are:  Monday 6:40 am  to 7:00 pm  Tuesday -Friday 6:40 am to 5:00 pm    The [place] outpatient lab hours are:  Monday - Friday 6:10 am to 4:45 pm  Saturdays 7:00 am to 11:00 am  Appointments are required, call [phone]    If you have clinical questions after hours or would like to schedule an appointment,  call the clinic at [phone].    Urinary Incontinence, Female (Adult)  Urinary incontinence means loss of control of the bladder. This problem affects many women, especially as they get older. If you have incontinence, you may be embarrassed to ask for help. But know that this problem can be treated.  Types of Incontinence  There are different types of incontinence. Two of the main types are described here. You can have more than one type.    Stress incontinence. With this type, urine leaks when pressure (stress) is put on the bladder. This may happen when you cough, sneeze, or laugh. Stress incontinence most often occurs because the pelvic floor muscles that support the bladder and urethra are weak. This can happen after pregnancy and vaginal childbirth or a hysterectomy. It can also  be due to excess body weight or hormone changes.    Urge incontinence (also called overactive bladder). With this type, a sudden urge to urinate is felt often. This may happen even though there may not be much urine in the bladder. The need to urinate often during the night is common. Urge incontinence most often occurs because of bladder spasms. This may be due to bladder irritation or infection. Damage to bladder nerves or pelvic muscles, constipation, and certain medicines can also lead to urge incontinence.  Treatment of urinary incontinence depends on the cause. Further evaluation is needed to find the type you have. This will likely include an exam and certain tests. Based on the results, you and your healthcare provider can then plan treatment. Until a diagnosis is made, the home care tips below can help relieve symptoms.  Home care    Do pelvic floor muscle exercises, if they are prescribed. The pelvic floor muscles help support the bladder and urethra. Many women find that their symptoms improve when doing special exercises that strengthen these muscles. To do the exercises contract the muscles you would use to stop your stream of urine, but do this when you re not urinating. Hold for 10 seconds, then relax. Repeat 10 to 20 times in a row, at least 3 times a day. Your provider may give you other instructions for how to do the exercises and how often.    Keep a bladder diary. This helps track how often and how much you urinate over a set period of time. Bring this diary with you to your next visit with the provider. The information can help your provider learn more about your bladder problem.    Lose weight, if advised to by your provider. Excess weight puts pressure on the bladder. Your provider can help you create a weight-loss plan that s right for you. This may include exercising more and making certain diet changes.    Don't consume foods and drinks that may irritate the bladder. These can include  alcohol and caffeinated drinks.    Quit smoking. Smoking and other tobacco use can lead to chronic cough that strains the pelvic floor muscles. Smoking may also damage the bladder and urethra. Talk with your provider about treatments or methods you can use to quit smoking.    If drinking large amounts of fluid causes you to have symptoms, you may be advised to limit your fluid intake. You may also be advised to drink most of your fluids during the day and to limit fluids at night.    If you re worried about urine leakage or accidents, you may wear absorbent pads to catch urine. Change the pads often. This helps reduce discomfort. It may also reduce the risk of skin or bladder infections.  Follow-up care  Follow up with your healthcare provider, or as directed. It may take some to find the right treatment for your problem. Your treatment plan may include special therapies or medicines. Certain procedures or surgery may also be options. Be sure to discuss any questions you have with your provider.  When to seek medical advice  Call the healthcare provider right away if any of these occur:    Fever of 100.4 F (38 C) or higher, or as directed by your provider    Bladder pain or fullness    Abdominal swelling    Nausea or vomiting    Back pain    Weakness, dizziness or fainting  Date Last Reviewed: 10/1/2017    3137-6119 [employer]. [address] [place] [address], [place] [address]. All rights reserved. This information is not intended as a substitute for professional medical care. Always follow your healthcare professional's instructions.            Rupert Gomez MD  [facility]

## 2018-04-24 NOTE — MR AVS SNAPSHOT
After Visit Summary   4/24/2018    Rhianna Gamboa    MRN: [id number]           Patient Information     Date Of Birth          6/19/1933        Visit Information        Provider Department      4/24/2018 9:20 AM Rupert Gomez MD [facility]        Today's Diagnoses     Incontinence in female    -  1    Alzheimer's dementia without behavioral disturbance, unspecified timing of dementia onset          Care Instructions    Possible pelvic floor weakness causing some difficulty controlling the urinary sphincter.  Schedule gynecology consult.  Use adult diaper (depends), mostly at night, but may use during the day if with daytime incontinence as well.        Thank you for choosing [facility].  You may be receiving a survey in the mail from [facility] regarding your visit today.  Please take a few minutes to complete and return the survey to let us know how we are doing.      If you have questions or concerns, please contact us via Quack or you can contact your care team at [phone].    Our Clinic hours are:  Monday 6:40 am  to 7:00 pm  Tuesday -Friday 6:40 am to 5:00 pm    The [place] outpatient lab hours are:  Monday - Friday 6:10 am to 4:45 pm  Saturdays 7:00 am to 11:00 am  Appointments are required, call [phone]    If you have clinical questions after hours or would like to schedule an appointment,  call the clinic at [phone].    Urinary Incontinence, Female (Adult)  Urinary incontinence means loss of control of the bladder. This problem affects many women, especially as they get older. If you have incontinence, you may be embarrassed to ask for help. But know that this problem can be treated.  Types of Incontinence  There are different types of incontinence. Two of the main types are described here. You can have more than one type.    Stress incontinence. With this type, urine leaks when pressure (stress) is put on the bladder. This may happen when you  cough, sneeze, or laugh. Stress incontinence most often occurs because the pelvic floor muscles that support the bladder and urethra are weak. This can happen after pregnancy and vaginal childbirth or a hysterectomy. It can also be due to excess body weight or hormone changes.    Urge incontinence (also called overactive bladder). With this type, a sudden urge to urinate is felt often. This may happen even though there may not be much urine in the bladder. The need to urinate often during the night is common. Urge incontinence most often occurs because of bladder spasms. This may be due to bladder irritation or infection. Damage to bladder nerves or pelvic muscles, constipation, and certain medicines can also lead to urge incontinence.  Treatment of urinary incontinence depends on the cause. Further evaluation is needed to find the type you have. This will likely include an exam and certain tests. Based on the results, you and your healthcare provider can then plan treatment. Until a diagnosis is made, the home care tips below can help relieve symptoms.  Home care    Do pelvic floor muscle exercises, if they are prescribed. The pelvic floor muscles help support the bladder and urethra. Many women find that their symptoms improve when doing special exercises that strengthen these muscles. To do the exercises contract the muscles you would use to stop your stream of urine, but do this when you re not urinating. Hold for 10 seconds, then relax. Repeat 10 to 20 times in a row, at least 3 times a day. Your provider may give you other instructions for how to do the exercises and how often.    Keep a bladder diary. This helps track how often and how much you urinate over a set period of time. Bring this diary with you to your next visit with the provider. The information can help your provider learn more about your bladder problem.    Lose weight, if advised to by your provider. Excess weight puts pressure on the bladder.  Your provider can help you create a weight-loss plan that s right for you. This may include exercising more and making certain diet changes.    Don't consume foods and drinks that may irritate the bladder. These can include alcohol and caffeinated drinks.    Quit smoking. Smoking and other tobacco use can lead to chronic cough that strains the pelvic floor muscles. Smoking may also damage the bladder and urethra. Talk with your provider about treatments or methods you can use to quit smoking.    If drinking large amounts of fluid causes you to have symptoms, you may be advised to limit your fluid intake. You may also be advised to drink most of your fluids during the day and to limit fluids at night.    If you re worried about urine leakage or accidents, you may wear absorbent pads to catch urine. Change the pads often. This helps reduce discomfort. It may also reduce the risk of skin or bladder infections.  Follow-up care  Follow up with your healthcare provider, or as directed. It may take some to find the right treatment for your problem. Your treatment plan may include special therapies or medicines. Certain procedures or surgery may also be options. Be sure to discuss any questions you have with your provider.  When to seek medical advice  Call the healthcare provider right away if any of these occur:    Fever of 100.4 F (38 C) or higher, or as directed by your provider    Bladder pain or fullness    Abdominal swelling    Nausea or vomiting    Back pain    Weakness, dizziness or fainting  Date Last Reviewed: 10/1/2017    9697-5293 [employer]. [address] [place] [address], [place] [address]. All rights reserved. This information is not intended as a substitute for professional medical care. Always follow your healthcare professional's instructions.                Follow-ups after your visit        Additional Services     OB/GYN REFERRAL       Your provider has referred you to:  BRENT: Velia Wyjob  "[facility] [phone]   [email or web address]    Please be aware that coverage of these services is subject to the terms and limitations of your health insurance plan.  Call member services at your health plan with any benefit or coverage questions.      Please bring the following with you to your appointment:    (1) Any X-Rays, CTs or MRIs which have been performed.  Contact the facility where they were done to arrange for  prior to your scheduled appointment.   (2) List of current medications   (3) This referral request   (4) Any documents/labs given to you for this referral                  Who to contact     If you have questions or need follow up information about today's clinic visit or your schedule please contact [facility] directly at [phone].  Normal or non-critical lab and imaging results will be communicated to you by MyChart, letter or phone within 4 business days after the clinic has received the results. If you do not hear from us within 7 days, please contact the clinic through MyChart or phone. If you have a critical or abnormal lab result, we will notify you by phone as soon as possible.  Submit refill requests through [employer] or call your pharmacy and they will forward the refill request to us. Please allow 3 business days for your refill to be completed.          Additional Information About Your Visit        [employer] Information     [employer] lets you send messages to your doctor, view your test results, renew your prescriptions, schedule appointments and more. To sign up, go to [email or web address] . Click on \"Log in\" on the left side of the screen, which will take you to the Welcome page. Then click on \"Sign up Now\" on the right side of the page.     You will be asked to enter the access code listed below, as well as some personal information. Please follow the directions to create your username and password.     Your access code is: " "[email or web address]  Expires: 5/15/2018  2:16 PM     Your access code will  in 90 days. If you need help or a new code, please call your [place] clinic or [phone].        Care EveryWhere ID     This is your Care EveryWhere ID. This could be used by other organizations to access your [place] medical records  [id number]        Your Vitals Were     Pulse Temperature Respirations Height BMI (Body Mass Index)       83 98.7  F (37.1  C) (Tympanic) 20 5' 2.5\" (1.588 m) 29.41 kg/m2        Blood Pressure from Last 3 Encounters:   18 119/71   18 107/59   17 120/66    Weight from Last 3 Encounters:   18 163 lb 6.4 oz (74.1 kg)   17 149 lb 6.4 oz (67.8 kg)   17 147 lb 6.4 oz (66.9 kg)              We Performed the Following     *UA reflex to Microscopic and Culture ([place] and [facility] (except [place] and [place])     OB/GYN REFERRAL        Primary Care Provider Office Phone # Fax #    Omar Olivo [phone] [address] [facility] [address]        Equal Access to Services     SARAH DAVIDSON AH: Hadii joanne ku hadasho Soomaali, waaxda luqadaha, qaybta kaalmada adeegyada, walter sauceda hayjaelyn altman . So [facility] [phone].    ATENCIÓN: Si habla español, tiene a alexander disposición servicios gratuitos de asistencia lingüística. Llame al [phone].    We comply with applicable federal civil rights laws and [place] laws. We do not discriminate on the basis of race, color, national origin, age, disability, sex, sexual orientation, or gender identity.            Thank you!     Thank you for choosing [facility]  for your care. Our goal is always to provide you with excellent care. Hearing back from our patients is one way we can continue to improve our services. Please take a few minutes to complete the written survey that you may receive in the mail after your visit with us. Thank you!             Your Updated Medication List " - Protect others around you: Learn how to safely use, store and throw away your medicines at www.disposemymeds.org.          This list is accurate as of 4/24/18 11:01 AM.  Always use your most recent med list.                   Brand Name Dispense Instructions for use Diagnosis    ALEVE 220 MG capsule   Generic drug:  naproxen sodium      Take 220 mg by mouth 2 times daily (with meals). PRN        aspirin 81 MG tablet     100    1 TABLET BY MOUTH DAILY    Other and unspecified hyperlipidemia       CALCIUM 600 + D 600-200 MG-UNIT Tabs     3 MONTHS    Reported on 5/2/2017    Osteoporosis, unspecified       cetirizine 10 MG tablet    zyrTEC    90 tablet    Take 1 tablet (10 mg) by mouth daily    Allergic rhinitis       cyanocobalamin 1000 MCG tablet    vitamin  B-12    100 tablet    Take 1,000 mcg by mouth daily Reported on 5/2/2017    Routine general medical examination at a health care facility       HYDROcodone-acetaminophen 5-325 MG per tablet    NORCO    20 tablet    Take 1 tablet by mouth every 6 hours as needed for moderate to severe pain maximum 4 tablet(s) per day    Sciatica of left side       ibuprofen 800 MG tablet    ADVIL/MOTRIN     Take 800 mg by mouth every 8 hours as needed for moderate pain        levothyroxine 75 MCG tablet    SYNTHROID    90 tablet    Take 1 tablet (75 mcg) by mouth daily    Hypothyroidism, unspecified type       Multiple vitamin Tabs      Reported on 5/2/2017    Other specified pre-operative examination       raloxifene 60 MG tablet    EVISTA    90 tablet    Take 1 tablet (60 mg) by mouth daily Hold on file until needed    Osteoporosis       RANITIDINE 75 PO      Take 1 tablet by mouth daily Reported on 5/2/2017        simvastatin 10 MG tablet    ZOCOR    90 tablet    Take 1 tablet (10 mg) by mouth At Bedtime    Hyperlipidemia LDL goal <130       VITAMIN B6 PO      Take 100 mg by mouth daily

## 2018-04-30 NOTE — TELEPHONE ENCOUNTER
Harsh Stallings admin orders received and started.  Form was routed to RN for review prior to Dr. Olivo for signature.

## 2018-05-01 NOTE — NURSING NOTE
"Initial /75 (BP Location: Left arm, Patient Position: Chair, Cuff Size: Adult Small)  Pulse 77  Temp 98  F (36.7  C) (Tympanic)  Resp 18  Ht 5' 2.5\" (1.588 m)  Wt 163 lb (73.9 kg)  BMI 29.34 kg/m2 Estimated body mass index is 29.34 kg/(m^2) as calculated from the following:    Height as of this encounter: 5' 2.5\" (1.588 m).    Weight as of this encounter: 163 lb (73.9 kg). .      "

## 2018-05-01 NOTE — PROGRESS NOTES
"84 year old  here for a gynecology checkup. Per her primary she has incontinence and a lesion or lump. She has no complaints.    She is a poor historian due to her Alzheimers.  Her daughter was present and said that she was on incontinence medication and it made her sedated and had problems with arousal so she stopped it.  There are no problems she is aware of.    Past Medical History:   Diagnosis Date     Basal cell carcinoma      Malignant melanoma (H)      Past Surgical History:   Procedure Laterality Date     AMPUTATE FINGER(S) Right 2016    Procedure: AMPUTATE FINGER(S);  Surgeon: Brandon Rollins MD;  Location: [facility]     BIOPSY NODE SENTINEL N/A 2016    Procedure: BIOPSY NODE SENTINEL;  Surgeon: Brandon Rollins MD;  [facility]: [facility]     EYE SURGERY  2009    right cataract     EYE SURGERY  2010    both eyes     RELEASE CARPAL TUNNEL Right 2015    Procedure: RELEASE CARPAL TUNNEL;  Surgeon: Omar Balderrama MD;  Location: [facility] OR     [facility]-reviewed  FH-noncontributory   ROS: 10 point ROS neg other than the symptoms noted above in the HPI.  /75 (BP Location: Left arm, Patient Position: Chair, Cuff Size: Adult Small)  Pulse 77  Temp 98  F (36.7  C) (Tympanic)  Resp 18  Ht 5' 2.5\" (1.588 m)  Wt 163 lb (73.9 kg)  BMI 29.34 kg/m2  Alert and oriented   Abdomen-soft,nontender  Pelvic exam:BUS normal, no leakage of urine with coughing or activity, normal vagina and vulva, normal cervix without lesions or tenderness, she does have a stage 1 rectocele uterus normal size anteverted, adenxa normal in size without tenderness.  Rectovaginal-no masses    ASSESSMENT / PLAN:  (N39.3) Female stress incontinence  (primary encounter diagnosis)  Comment: normal exam  Plan: no evidence for concern. PT is an option or a pessary if it does become an issue.  The daughter denied any issues at this point of time, otherwise normal exam  30 minutes was spent face to face with the patient today discussing " her history, diagnosis, and follow-up plan as noted above.  Over 50% of the visit was spent in counseling and coordination of care.    Total Visit Time: 30 minutes.  Yasmin Bonilla MD

## 2018-05-01 NOTE — MR AVS SNAPSHOT
"              After Visit Summary   2018    Rhianna Gamboa    MRN: [id number]           Patient Information     Date Of Birth          1933        Visit Information        Provider Department      2018 1:30 PM Yasmin Bonilla MD [facility]        Today's Diagnoses     Female stress incontinence    -  1       Follow-ups after your visit        Who to contact     If you have questions or need follow up information about today's clinic visit or your schedule please contact [facility] directly at [phone].  Normal or non-critical lab and imaging results will be communicated to you by i-[employer], letter or phone within 4 business days after the clinic has received the results. If you do not hear from us within 7 days, please contact the clinic through [employer] or phone. If you have a critical or abnormal lab result, we will notify you by phone as soon as possible.  Submit refill requests through [employer] or call your pharmacy and they will forward the refill request to us. Please allow 3 business days for your refill to be completed.          Additional Information About Your Visit        MyChart Information     [employer] lets you send messages to your doctor, view your test results, renew your prescriptions, schedule appointments and more. To sign up, go to [email or web address] . Click on \"Log in\" on the left side of the screen, which will take you to the Welcome page. Then click on \"Sign up Now\" on the right side of the page.     You will be asked to enter the access code listed below, as well as some personal information. Please follow the directions to create your username and password.     Your access code is: [email or web address]  Expires: 5/15/2018  2:16 PM     Your access code will  in 90 days. If you need help or a new code, please call your [facility] or [phone].        Care EveryWhere ID     This is your Care EveryWhere ID. This could be used by " "other organizations to access your [place] medical records  [id number]        Your Vitals Were     Pulse Temperature Respirations Height BMI (Body Mass Index)       77 98  F (36.7  C) (Tympanic) 18 5' 2.5\" (1.588 m) 29.34 kg/m2        Blood Pressure from Last 3 Encounters:   05/01/18 127/75   04/24/18 119/71   02/14/18 107/59    Weight from Last 3 Encounters:   05/01/18 163 lb (73.9 kg)   04/24/18 163 lb 6.4 oz (74.1 kg)   05/02/17 149 lb 6.4 oz (67.8 kg)              Today, you had the following     No orders found for display       Primary Care Provider Office Phone # Fax #    Omar Olivo [phone] [address] [facility] [address]        Equal Access to Services     [facility] : Hadii aad ku hadasho Soomaali, waaxda luqadaha, qaybta kaalmada adeegyada, waxay ashiain hayaan raul altman . So [facility] [phone].    ATENCIÓN: Si habla español, tiene a alexander disposición servicios gratuitos de asistencia lingüística. Llame al [phone].    We comply with applicable federal civil rights laws and [place] laws. We do not discriminate on the basis of race, color, national origin, age, disability, sex, sexual orientation, or gender identity.            Thank you!     Thank you for choosing [facility]  for your care. Our goal is always to provide you with excellent care. Hearing back from our patients is one way we can continue to improve our services. Please take a few minutes to complete the written survey that you may receive in the mail after your visit with us. Thank you!             Your Updated Medication List - Protect others around you: Learn how to safely use, store and throw away your medicines at www.disposemymeds.org.          This list is accurate as of 5/1/18  3:55 PM.  Always use your most recent med list.                   Brand Name Dispense Instructions for use Diagnosis    ALEVE 220 MG capsule   Generic drug:  naproxen sodium      Take 220 mg " by mouth 2 times daily (with meals). PRN        aspirin 81 MG tablet     100    1 TABLET BY MOUTH DAILY    Other and unspecified hyperlipidemia       CALCIUM 600 + D 600-200 MG-UNIT Tabs     3 MONTHS    Reported on 5/2/2017    Osteoporosis, unspecified       cetirizine 10 MG tablet    zyrTEC    90 tablet    Take 1 tablet (10 mg) by mouth daily    Allergic rhinitis       cyanocobalamin 1000 MCG tablet    vitamin  B-12    100 tablet    Take 1,000 mcg by mouth daily Reported on 5/2/2017    Routine general medical examination at a health care facility       HYDROcodone-acetaminophen 5-325 MG per tablet    NORCO    20 tablet    Take 1 tablet by mouth every 6 hours as needed for moderate to severe pain maximum 4 tablet(s) per day    Sciatica of left side       ibuprofen 800 MG tablet    ADVIL/MOTRIN     Take 800 mg by mouth every 8 hours as needed for moderate pain        levothyroxine 75 MCG tablet    SYNTHROID    90 tablet    Take 1 tablet (75 mcg) by mouth daily    Hypothyroidism, unspecified type       Multiple vitamin Tabs      Reported on 5/2/2017    Other specified pre-operative examination       raloxifene 60 MG tablet    EVISTA    90 tablet    Take 1 tablet (60 mg) by mouth daily Hold on file until needed    Osteoporosis       RANITIDINE 75 PO      Take 1 tablet by mouth daily Reported on 5/2/2017        simvastatin 10 MG tablet    ZOCOR    90 tablet    Take 1 tablet (10 mg) by mouth At Bedtime    Hyperlipidemia LDL goal <130       VITAMIN B6 PO      Take 100 mg by mouth daily

## 2018-05-01 NOTE — TELEPHONE ENCOUNTER
Reviewed form.  Placed at Dr Olivo's desk for completion.  The remaining portions require dr's response.  Thank you.    Yadira Hoffmann RN

## 2018-05-01 NOTE — TELEPHONE ENCOUNTER
Dr Olivo completed his portions.  Forms back to RN.    Left non-detailed message for Harsh Stallings [facility], [phone], to return a call to the clinic RN.  Most recent H&P is 2016.  Pt did see Dr Gomez in April and Dr Olivo last September.  Will these office notes be acceptable?  ISAIAH Hoffmann RN

## 2018-05-01 NOTE — TELEPHONE ENCOUNTER
Juan Carlos called back.  Yes, last two office visits will be acceptable.  Printed and placed in folder.  Folder returned to Yasemin in forms.    Yadira Hoffmann RN

## 2018-05-02 NOTE — TELEPHONE ENCOUNTER
Form from [facility]-Physician Order Sheet. Orders were signed, faxed and sent to [facility].    [facility]

## 2018-05-02 NOTE — PROGRESS NOTES
Clinic Care Coordination Contact  Care Team Conversations    [facility] had been following pt with care coordination services on a peripheral basis as pt's family was providing paid  services for her in her own apartment.  It now appears that the pt is moving into [facility] assisted living [facility] in [place], [place].    [facility] will close the pt to care coordination at this time.  Please place new referral if new needs arise.    Sushma Riley  Social Work Care Coordinator  [place]  [phone]

## 2018-05-04 NOTE — TELEPHONE ENCOUNTER
Forms re-faxed with clarifications done on medications and sent to scanning.    [facility] [place]

## 2018-05-16 NOTE — TELEPHONE ENCOUNTER
Reason for call:  Patient reporting a symptom    Symptom or request: Pt has a history of UTIs.  Today she has a temp of 101.2, /62 and pulse of 92.  She is asking for an order for a UA.      Duration (how long have symptoms been present): today    Have you been treated for this before? Yes    Additional comments:     Phone Number patient can be reached at:  Home number on file [phone] (home)    Best Time:  any    Can we leave a detailed message on this number:  YES    Call taken on 5/16/2018 at 2:02 PM by Lola Taylor

## 2018-05-16 NOTE — TELEPHONE ENCOUNTER
Left message for patient to return call to clinic.   Liza Portillo RN    Seen Dr. Gomez on 04/24/18 for UTI.:  UA notes:  Notes Recorded by Rupert Gomez MD on 4/26/2018 at 7:28 AM  Please call patient's son.  Bacteria in culture is sensitive to Bactrim that was given to patient.   No additional treatment is needed.   If patient has recurrence or worsening symptoms, patient should see a provider again.

## 2018-05-17 NOTE — TELEPHONE ENCOUNTER
Called [facility], left message for patient's Rn to return call to clinic    Merry VIDAL Rn

## 2018-05-17 NOTE — TELEPHONE ENCOUNTER
Prosper calling back from [facility], she reports patient is not feeling well  Pt has a history of UTIs.    Yesterday she had a temp of 101.2, /62 and pulse of 92.  Today her symptoms are the same  She is asking for an order for a UA/UC verbal order    Contact:  [phone], prosper    Routing to provider.    Merry VIDAL Rn

## 2018-05-17 NOTE — TELEPHONE ENCOUNTER
Urinalysis with reflex to culture is ordered.    However, patient should be evaluated by a provider as fever and other constitutional symptoms may be due to many other causes.  A urinalysis will help rule out only one condition.

## 2018-05-17 NOTE — TELEPHONE ENCOUNTER
Ro at [facility] notified of provider's recommendations  She verbalized understanding      Merry VIDAL Rn

## 2018-05-22 NOTE — TELEPHONE ENCOUNTER
Marta called stating that patient has appt scheduled for 06/02 to see Dr Olivo, however needs verbal order for patient to start OT and PT 5/29. Patient currently at assisted living due to alzheimers.     [facility]  [facility]

## 2018-05-22 NOTE — TELEPHONE ENCOUNTER
"Requested Prescriptions   Pending Prescriptions Disp Refills     ASPIRIN LOW DOSE 81 MG chewable tablet [Pharmacy Med Name: ASPIRIN LOW CHW 81MG]  Last Written Prescription Date:  03/06/07  Last Fill Quantity: 100,  # refills: 3   Last office visit: 4/24/2018 with prescribing provider:  04/24/18   Future Office Visit:   Next 5 appointments (look out 90 days)     Jun 04, 2018  9:20 AM CDT   SHORT with Omar Olivo MD   [facility]    [address] [place] [address]   [phone]                11     Sig: CHEW AND SWALLOW ONE TABLET BY MOUTH ONCE DAILY    Analgesics (Non-Narcotic Tylenol and ASA Only) Passed    5/22/2018 11:12 AM       Passed - Recent (12 mo) or future (30 days) visit within the authorizing provider's specialty    Patient had office visit in the last 12 months or has a visit in the next 30 days with authorizing provider or within the authorizing provider's specialty.  See \"Patient Info\" tab in inbasket, or \"Choose Columns\" in Meds & Orders section of the refill encounter.           Passed - Patient is age 20 years or older    If ASA is flagged for ages under 20 years old. Forward to provider for confirmation Ryes Syndrome is not a concern.              Calcium Carb-Cholecalciferol (CALCIUM + D3) 600-200 MG-UNIT TABS [Pharmacy Med Name: CALCIUM 600MG/VIT D 200IU]  Last Written Prescription Date:  03/06/17  Last Fill Quantity: 3 mths,  # refills: 1 year   Last office visit: 4/24/2018 with prescribing provider:  04/24/18   Future Office Visit:   Next 5 appointments (look out 90 days)     Jun 04, 2018  9:20 AM CDT   SHORT with [facility]ivo MD   [facility]    [address] [place] [address]   [phone]                11     Sig: TAKE TWO TABLETS BY MOUTH ONCE DAILY    Vitamin Supplements (Adult) Protocol Passed    5/22/2018 11:12 AM       Passed - High dose Vitamin D not ordered " "      Passed - Recent (12 mo) or future (30 days) visit within the authorizing provider's specialty    Patient had office visit in the last 12 months or has a visit in the next 30 days with authorizing provider or within the authorizing provider's specialty.  See \"Patient Info\" tab in inbasket, or \"Choose Columns\" in Meds & Orders section of the refill encounter.            cetirizine (ZYRTEC) 10 MG tablet [Pharmacy Med Name: CETIRIZINE TAB 10MG]  Last Written Prescription Date:  03/03/14  Last Fill Quantity: 90,  # refills: 3   Last office visit: 4/24/2018 with prescribing provider:  04/24/18   Future Office Visit:   Next 5 appointments (look out 90 days)     Jun 04, 2018  9:20 AM CDT   [facility]   [facility]    [address] [address]   [phone]                11     Sig: TAKE 1 TABLET BY MOUTH ONCE DAILY    Antihistamines Protocol Failed    5/22/2018 11:12 AM       Failed - Patient is 3-64 years of age    Apply weight-based dosing for peds patients age 3 - 12 years of age.    Forward request to provider for patients under the age of 3 or over the age of 64.         Passed - Recent (12 mo) or future (30 days) visit within the authorizing provider's specialty    Patient had office visit in the last 12 months or has a visit in the next 30 days with authorizing provider or within the authorizing provider's specialty.  See \"Patient Info\" tab in inbasket, or \"Choose Columns\" in Meds & Orders section of the refill encounter.            ranitidine (ZANTAC) 75 MG tablet [Pharmacy Med Name: RANITIDINE TAB 75MG]  11    HIstorical Sig: TAKE 1 TABLET BY MOUTH ONCE DAILY    H2 Blockers Protocol Passed    5/22/2018 11:12 AM       Passed - Patient is age 12 or older       Passed - Recent (12 mo) or future (30 days) visit within the authorizing provider's specialty    Patient had office visit in the last 12 months or has a visit in the next " "30 days with authorizing provider or within the authorizing provider's specialty.  See \"Patient Info\" tab in inbasket, or \"Choose Columns\" in Meds & Orders section of the refill encounter.            Multiple Vitamin (TAB-A-MILAD) TABS [Pharmacy Med Name: TAB-A-MILAD TAB]  11    Historical Sig: TAKE 1 TABLET BY MOUTH ONCE DAILY    Vitamin Supplements (Adult) Protocol Passed    5/22/2018 11:12 AM       Passed - High dose Vitamin D not ordered       Passed - Recent (12 mo) or future (30 days) visit within the authorizing provider's specialty    Patient had office visit in the last 12 months or has a visit in the next 30 days with authorizing provider or within the authorizing provider's specialty.  See \"Patient Info\" tab in inbasket, or \"Choose Columns\" in Meds & Orders section of the refill encounter.            cyanocobalamin (VITAMIN  B-12) 1000 MCG tablet [Pharmacy Med Name: VITAMIN B-12 TAB 1000MCG]  Last Written Prescription Date:  12/16/16  Last Fill Quantity: 100,  # refills: 3   Last office visit: 4/24/2018 with prescribing provider:  04/24/18   Future Office Visit:   Next 5 appointments (look out 90 days)     Jun 04, 2018  9:20 AM CDT   SHORT with Omar Olivo MD   [facility]    [address] [address]   [phone]                11     Sig: TAKE 1 TABLET BY MOUTH ONCE DAILY    Vitamin Supplements (Adult) Protocol Passed    5/22/2018 11:12 AM       Passed - High dose Vitamin D not ordered       Passed - Recent (12 mo) or future (30 days) visit within the authorizing provider's specialty    Patient had office visit in the last 12 months or has a visit in the next 30 days with authorizing provider or within the authorizing provider's specialty.  See \"Patient Info\" tab in inbasket, or \"Choose Columns\" in Meds & Orders section of the refill encounter.            pyridoxine (VITAMIN B-6) 100 MG tablet [Pharmacy Med Name: VITAMIN B-6 TAB 100MG]  " 11    Historical Sig: TAKE 1 TABLET BY MOUTH ONCE DAILY    There is no refill protocol information for this order

## 2018-05-22 NOTE — TELEPHONE ENCOUNTER
"Requested Prescriptions   Pending Prescriptions Disp Refills     ASPIRIN LOW DOSE 81 MG chewable tablet [Pharmacy Med Name: ASPIRIN LOW CHW 81MG]  Last Written Prescription Date:  03/06/2007  Last Fill Quantity: 100,  # refills: 3   Last office visit: 4/24/2018 with prescribing provider:  Patricia Gonzalez Office Visit:   Next 5 appointments (look out 90 days)     Jun 04, 2018  9:20 AM CDT   SHORT with Omar Olivo MD   [facility]    [address] [address]   [phone]                        Sig: CHEW AND SWALLOW ONE TABLET BY MOUTH ONCE DAILY    Analgesics (Non-Narcotic Tylenol and ASA Only) Passed    5/21/2018  6:10 PM       Passed - Recent (12 mo) or future (30 days) visit within the authorizing provider's specialty    Patient had office visit in the last 12 months or has a visit in the next 30 days with authorizing provider or within the authorizing provider's specialty.  See \"Patient Info\" tab in inbasket, or \"Choose Columns\" in Meds & Orders section of the refill encounter.           Passed - Patient is age 20 years or older    If ASA is flagged for ages under 20 years old. Forward to provider for confirmation Ryes Syndrome is not a concern.              Calcium Carb-Cholecalciferol (CALCIUM + D3) 600-200 MG-UNIT TABS [Pharmacy Med Name: CALCIUM 600MG/VIT D 200IU]  Last Written Prescription Date:  03/06/2017  Last Fill Quantity: 3mo,  # refills: 1 year   Last office visit: 4/24/2018 with prescribing provider:  Patricia Gonzalez Office Visit:   Next 5 appointments (look out 90 days)     Jun 04, 2018  9:20 AM CDT   SHORT with Omar Olivo MD   [facility]    [address] [address]   [phone]                   11     Sig: TAKE TWO TABLETS BY MOUTH ONCE DAILY    Vitamin Supplements (Adult) Protocol Passed    5/21/2018  6:10 PM       Passed - High dose Vitamin D not ordered    " "   Passed - Recent (12 mo) or future (30 days) visit within the authorizing provider's specialty    Patient had office visit in the last 12 months or has a visit in the next 30 days with authorizing provider or within the authorizing provider's specialty.  See \"Patient Info\" tab in inbasket, or \"Choose Columns\" in Meds & Orders section of the refill encounter.            cetirizine (ZYRTEC) 10 MG tablet [Pharmacy Med Name: CETIRIZINE TAB 10MG]  Last Written Prescription Date:  3/31/2014  Last Fill Quantity: 90,  # refills: 3   Last office visit: 4/24/2018 with prescribing provider:  Patricia   Future Office Visit:   Next 5 appointments (look out 90 days)     Jun 04, 2018  9:20 AM CDT   SHORT with Omar Olivo MD   [facility]    [address] [address]   [phone]                   11     Sig: TAKE 1 TABLET BY MOUTH ONCE DAILY    Antihistamines Protocol Failed    5/21/2018  6:10 PM       Failed - Patient is 3-64 years of age    Apply weight-based dosing for peds patients age 3 - 12 years of age.    Forward request to provider for patients under the age of 3 or over the age of 64.         Passed - Recent (12 mo) or future (30 days) visit within the authorizing provider's specialty    Patient had office visit in the last 12 months or has a visit in the next 30 days with authorizing provider or within the authorizing provider's specialty.  See \"Patient Info\" tab in inbasket, or \"Choose Columns\" in Meds & Orders section of the refill encounter.            ranitidine (ZANTAC) 75 MG tablet [Pharmacy Med Name: RANITIDINE TAB 75MG]  Last Written Prescription Date:  Historic  Last Fill Quantity: \,  # refills: \   Last office visit: 4/24/2018 with prescribing provider:  Patricia   Future Office Visit:   Next 5 appointments (look out 90 days)     Jun 04, 2018  9:20 AM CDT   SHORT with Omar Olivo MD   [facility] " "   [address] [address]   [phone]                   11     Sig: TAKE 1 TABLET BY MOUTH ONCE DAILY    H2 Blockers Protocol Passed    5/21/2018  6:10 PM       Passed - Patient is age 12 or older       Passed - Recent (12 mo) or future (30 days) visit within the authorizing provider's specialty    Patient had office visit in the last 12 months or has a visit in the next 30 days with authorizing provider or within the authorizing provider's specialty.  See \"Patient Info\" tab in inbasket, or \"Choose Columns\" in Meds & Orders section of the refill encounter.            Multiple Vitamin (TAB-A-MILAD) TABS [Pharmacy Med Name: TAB-A-MILAD TAB]  Last Written Prescription Date:  Historical  Last Fill Quantity: ,  # refills:    Last office visit: 4/24/2018 with prescribing provider:  patricia   Future Office Visit:   Next 5 appointments (look out 90 days)     Jun 04, 2018  9:20 AM CDT   SHORT with Omar Olivo MD   [facility]    [address] [address]   [phone]                   11     Sig: TAKE 1 TABLET BY MOUTH ONCE DAILY    Vitamin Supplements (Adult) Protocol Passed    5/21/2018  6:10 PM       Passed - High dose Vitamin D not ordered       Passed - Recent (12 mo) or future (30 days) visit within the authorizing provider's specialty    Patient had office visit in the last 12 months or has a visit in the next 30 days with authorizing provider or within the authorizing provider's specialty.  See \"Patient Info\" tab in inbasket, or \"Choose Columns\" in Meds & Orders section of the refill encounter.            cyanocobalamin (VITAMIN  B-12) 1000 MCG tablet [Pharmacy Med Name: VITAMIN B-12 TAB 1000MCG]  Last Written Prescription Date:  12/16/2010  Last Fill Quantity: 100,  # refills: 3   Last office visit: 4/24/2018 with prescribing provider:  Patricia  Future Office Visit:   Next 5 appointments (look out 90 days)     Jun 04, 2018  " "9:20 AM CDT   SHORT with Omar Olivo MD   [facility]    [address] [address]   [phone]                        Sig: TAKE 1 TABLET BY MOUTH ONCE DAILY    Vitamin Supplements (Adult) Protocol Passed    5/21/2018  6:10 PM       Passed - High dose Vitamin D not ordered       Passed - Recent (12 mo) or future (30 days) visit within the authorizing provider's specialty    Patient had office visit in the last 12 months or has a visit in the next 30 days with authorizing provider or within the authorizing provider's specialty.  See \"Patient Info\" tab in inbasket, or \"Choose Columns\" in Meds & Orders section of the refill encounter.            pyridoxine (VITAMIN B-6) 100 MG tablet [Pharmacy Med Name: VITAMIN B-6 TAB 100MG]  Last Written Prescription Date:  Not currently on med list  Last Fill Quantity: ,   # refills:   Last Office Visit: Gomez  Future Office visit:    Next 5 appointments (look out 90 days)     Jun 04, 2018  9:20 AM CDT   SHORT with Omar Olivo MD   [facility]    [address] [address]   [phone]                   Routing refill request to provider for review/approval because:  Drug not on the [facility], [facility] or [facility] refill protocol or controlled substance    Future Office Visit:   Next 5 appointments (look out 90 days)     Jun 04, 2018  9:20 AM CDT   SHORT with Omar Olivo MD   [facility]    [address] [address]   [phone]                   11     Sig: TAKE 1 TABLET BY MOUTH ONCE DAILY    There is no refill protocol information for this order          "

## 2018-05-23 NOTE — TELEPHONE ENCOUNTER
Routing refill request to provider for review/approval because:  Patient is 85 yo- failed Antihistamine protocol  Okay for quant 31?     Liza MENON RN

## 2018-05-23 NOTE — TELEPHONE ENCOUNTER
Reason for Call:  Other prescription    Detailed comments: Matthieu from [facility] calling reporting levothyroxine was missed 5/21 and 5/22 due to med error. [employer] labs are coming up.    Phone Number Patient can be reached at: Other phone number:  [phone]    Best Time: any    Can we leave a detailed message on this number? YES    Call taken on 5/23/2018 at 11:08 AM by Cristina Orlando

## 2018-05-25 NOTE — TELEPHONE ENCOUNTER
Form from [facility]-Physician Orders. Orders were signed, faxed and sent to [facility].    [facility]

## 2018-05-29 NOTE — PROGRESS NOTES
[facility] now requests orders and shares plan of care/discharge summaries for some patients through [employer].  Please REPLY TO THIS MESSAGE OR ROUTE BACK TO THE AUTHOR in order to give authorization for orders when needed.  This is considered a verbal order, you will still receive a faxed copy of orders for signature.  Thank you for your assistance in improving collaboration for our patients.    ORDER    Requesting  To cont home PT 2x a week for 3 weeks for fall prevention and walker safety, impaired gait and balance, and transfer training.     Also requesting home OT evaluation.       When I did her medication list it alerted me to an interaction between Ibuprofen and Naproxen Sodium, if there are any changes needed please let me know.  Thanks

## 2018-06-04 NOTE — TELEPHONE ENCOUNTER
Reason for Call:  Other     Detailed comments: Nicole calling requesting verbal orders for OT cognitive assessment, home safety, fall prevention and equipment needs.     Phone Number Patient can be reached at: Other phone number:  Nicole [phone]    Best Time: any    Can we leave a detailed message on this number? YES    Call taken on 6/4/2018 at 2:10 PM by Cristina Orlando

## 2018-06-04 NOTE — PATIENT INSTRUCTIONS
Please go to lab.    Follow up in 6 months.    I have printed the prescriptions.          Thank you for choosing [facility].  You may be receiving a survey in the mail from Alex Avendaño regarding your visit today.  Please take a few minutes to complete and return the survey to let us know how we are doing.      If you have questions or concerns, please contact us via [facility] or you can contact your care team at [phone].    Our Clinic hours are:  Monday 6:40 am  to 7:00 pm  Tuesday -Friday 6:40 am to 5:00 pm    The [place] outpatient lab hours are:  Monday - Friday 6:10 am to 4:45 pm  Saturdays 7:00 am to 11:00 am  Appointments are required, call [phone]    If you have clinical questions after hours or would like to schedule an appointment,  call the clinic at [phone].

## 2018-06-04 NOTE — PROGRESS NOTES
SUBJECTIVE:   Rhianna Gamboa is a 84 year old female who presents to clinic today for the following health issues:  Chief Complaint   Patient presents with     general health check     not a physical. Had a UTI several weeks ago, finished course of Abx. Does not have any further symptoms. Initially had nocturnal incontenence, which has resolved. Has moved to the memory unit at [facility].       Patient is here for check in.  Family has moved to patient to a memory care unit and things are going well.      Rhianna's memory continues to decline.  She cannot remember recent events.  She cannot name all of her children.    In talking with Arelis who is power of , discussed with her about future plans for healthcare directive.  Discussed DNR/DNI, what would occur if she loses weight, would she have a g tube or other scenario of recurrent aspiration pneumonia and discussed futile care and what also is a nature death. We reviewed medication and usefulness of some of them and discontinuing those she does not need due to changes in her care and living situation.     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)     Alzheimer's dementia without behavioral disturbance, unspecified timing of dementia onset     Past Medical History:   Diagnosis Date     Basal cell carcinoma      Malignant melanoma (H)      Past Surgical History:   Procedure Laterality Date     AMPUTATE FINGER(S) Right 1/20/2016    Procedure: AMPUTATE FINGER(S);  Surgeon: Brandon Rollins MD;  Location: [facility] OR     BIOPSY NODE SENTINEL N/A 1/20/2016    Procedure: BIOPSY NODE SENTINEL;  Surgeon: Brandon Rollins MD;  [facility]: [facility]     EYE SURGERY  7/2009    right cataract     EYE SURGERY  2010    both eyes     RELEASE CARPAL TUNNEL Right  "5/12/2015    Procedure: RELEASE CARPAL TUNNEL;  Surgeon: Omar Balderrama MD;  Location: [facility] OR             Problem list and histories reviewed & adjusted, as indicated.  Additional history: as documented        Reviewed and updated as needed this visit by clinical staff  Allergies  Meds       Reviewed and updated as needed this visit by Provider         ROS:  CONSTITUTIONAL:NEGATIVE for fever, chills, change in weight  INTEGUMENTARY/SKIN: NEGATIVE for worrisome rashes, moles or lesions  EYES: NEGATIVE for vision changes or irritation  ENT/MOUTH: NEGATIVE for ear, mouth and throat problems  RESP:NEGATIVE for significant cough or SOB  CV: NEGATIVE for chest pain, palpitations or peripheral edema  MUSCULOSKELETAL: NEGATIVE for significant arthralgias or myalgia  NEURO: as above  ENDOCRINE: NEGATIVE for temperature intolerance, skin/hair changes  HEME/ALLERGY/IMMUNE: NEGATIVE for bleeding problems  PSYCHIATRIC: NEGATIVE for changes in mood or affect    OBJECTIVE:                                                    /66 (Cuff Size: Adult Large)  Pulse 72  Temp 98.3  F (36.8  C) (Tympanic)  Ht 5' 2.5\" (1.588 m)  Wt 167 lb (75.8 kg)  BMI 30.06 kg/m2  Body mass index is 30.06 kg/(m^2).  GENERAL APPEARANCE: alert, no distress and cooperative  HENT: ear canals and TM's normal and nose and mouth without ulcers or lesions  NECK: no adenopathy, no asymmetry, masses, or scars and thyroid normal to palpation  RESP: lungs clear to auscultation - no rales, rhonchi or wheezes  CV: regular rates and rhythm, normal S1 S2, no S3 or S4 and no murmur, click or rub  ABDOMEN: soft, nontender, without hepatosplenomegaly or masses and bowel sounds normal  MS: extremities normal- no gross deformities noted  SKIN: no suspicious lesions or rashes  NEURO: A and O to self  PSYCH: mentation appears normal and affect normal/bright         ASSESSMENT/PLAN:                                                    (G30.9,  F02.80) Alzheimer's " dementia without behavioral disturbance, unspecified timing of dementia onset  (primary encounter diagnosis)  Comment: discussed need to get DNR/DNI set up, discussed need to decide on future cares due to diagnosis, discussed stopping other meds that are truly not needed.  Plan: cyanocobalamin (VITAMIN  B-12) 1000 MCG tablet,        pyridoxine (VITAMIN B-6) 100 MG tablet, Basic         metabolic panel, CANCELED: Basic metabolic         panel            (E03.9) Hypothyroidism, unspecified type  Comment: need to recheck lab  Plan: levothyroxine (SYNTHROID) 75 MCG tablet, TSH,         T4 free, CANCELED: TSH, CANCELED: T4 FREE            (E78.5) Hyperlipidemia LDL goal <130  Comment: recheck labs  Plan: simvastatin (ZOCOR) 10 MG tablet, Lipid panel         reflex to direct LDL Fasting, CANCELED: Lipid         panel reflex to direct LDL Fasting            (M81.0) Osteoporosis, unspecified osteoporosis type, unspecified pathological fracture presence  Comment: stop one med and continue with supplement  Plan: Calcium Carb-Cholecalciferol (CALCIUM + D3)         600-200 MG-UNIT TABS                See Patient Instructions  Discussed with Arelis about getting other items set up due to progressive dementia, known course, futile cares etc.     Counseling/Coordination of care is over 20 min in 40 min appt.      Omar Olivo MD  [facility]

## 2018-06-04 NOTE — MR AVS SNAPSHOT
After Visit Summary   6/4/2018    Rhianna Gamboa    MRN: [id number]           Patient Information     Date Of Birth          6/19/1933        Visit Information        Provider Department      6/4/2018 9:20 AM Omar Olivo MD [facility]        Today's Diagnoses     Alzheimer's dementia without behavioral disturbance, unspecified timing of dementia onset    -  1    Routine general medical examination at a health care facility        Hypothyroidism, unspecified type        Hyperlipidemia LDL goal <130          Care Instructions    Please go to lab.    Follow up in 6 months.    I have printed the prescriptions.          Thank you for choosing [facility].  You may be receiving a survey in the mail from [employer] regarding your visit today.  Please take a few minutes to complete and return the survey to let us know how we are doing.      If you have questions or concerns, please contact us via McP[employer] or you can contact your care team at [phone].    Our Clinic hours are:  Monday 6:40 am  to 7:00 pm  Tuesday -Friday 6:40 am to 5:00 pm    The [place] outpatient lab hours are:  Monday - Friday 6:10 am to 4:45 pm  Saturdays 7:00 am to 11:00 am  Appointments are required, call [phone]    If you have clinical questions after hours or would like to schedule an appointment,  call the clinic at [phone].            Follow-ups after your visit        Follow-up notes from your care team     Return in about 6 months (around 12/4/2018).      Who to contact     If you have questions or need follow up information about today's clinic visit or your schedule please contact [facility] directly at [phone].  Normal or non-critical lab and imaging results will be communicated to you by MyChart, letter or phone within 4 business days after the clinic has received the results. If you do not hear from us within 7 days, please contact the clinic through [employer]unkert or phone.  "If you have a critical or abnormal lab result, we will notify you by phone as soon as possible.  Submit refill requests through [employer] or call your pharmacy and they will forward the refill request to us. Please allow 3 business days for your refill to be completed.          Additional Information About Your Visit        Care EveryWhere ID     This is your Care EveryWhere ID. This could be used by other organizations to access your [place] medical records  [id number]        Your Vitals Were     Pulse Temperature Height BMI (Body Mass Index)          72 98.3  F (36.8  C) (Tympanic) 5' 2.5\" (1.588 m) 30.06 kg/m2         Blood Pressure from Last 3 Encounters:   06/04/18 118/66   05/01/18 127/75   04/24/18 119/71    Weight from Last 3 Encounters:   06/04/18 167 lb (75.8 kg)   05/01/18 163 lb (73.9 kg)   04/24/18 163 lb 6.4 oz (74.1 kg)              We Performed the Following     Basic metabolic panel     Lipid panel reflex to direct LDL Fasting     T4 FREE     TSH          Today's Medication Changes          These changes are accurate as of 6/4/18 10:17 AM.  If you have any questions, ask your nurse or doctor.               These medicines have changed or have updated prescriptions.        Dose/Directions    aspirin 81 MG tablet   This may have changed:  Another medication with the same name was removed. Continue taking this medication, and follow the directions you see here.   Used for:  Other and unspecified hyperlipidemia        1 TABLET BY MOUTH DAILY   Quantity:  100   Refills:  3       Calcium + D3 600-200 MG-UNIT Tabs   This may have changed:  See the new instructions.   Used for:  Routine general medical examination at a health care facility        Dose:  1 tablet   Take 1 tablet by mouth 2 times daily   Quantity:  62 tablet   Refills:  11       cyanocobalamin 1000 MCG tablet   Commonly known as:  vitamin  B-12   This may have changed:  See the new instructions.   Used for:  Routine general medical " examination at a health care facility        Dose:  1000 mcg   Take 1 tablet (1,000 mcg) by mouth daily   Quantity:  31 tablet   Refills:  11       pyridoxine 100 MG tablet   Commonly known as:  VITAMIN B-6   This may have changed:    - See the new instructions.  - Another medication with the same name was removed. Continue taking this medication, and follow the directions you see here.   Used for:  Routine general medical examination at a health care facility        Dose:  100 mg   Take 1 tablet (100 mg) by mouth daily   Quantity:  31 tablet   Refills:  11         Stop taking these medicines if you haven't already. Please contact your care team if you have questions.     CALCIUM 600 + D 600-200 MG-UNIT Tabs           HYDROcodone-acetaminophen 5-325 MG per tablet   Commonly known as:  NORCO           ibuprofen 800 MG tablet   Commonly known as:  ADVIL/MOTRIN           RANITIDINE 75 PO                Where to get your medicines      Some of these will need a paper prescription and others can be bought over the counter.  Ask your nurse if you have questions.     Bring a paper prescription for each of these medications     Calcium + D3 600-200 MG-UNIT Tabs    cyanocobalamin 1000 MCG tablet    levothyroxine 75 MCG tablet    pyridoxine 100 MG tablet    simvastatin 10 MG tablet                Primary Care Provider Office Phone # Fax #    Omar Olivo [phone] [address] [address]        Equal Access to Services     SARAH DAVIDSON : Mian stein Sotimo, waaxda luqadaha, qaybta kaalmada adecalixto, walter bonilla. So [facility] [phone].    ATENCIÓN: Si habla español, tiene a alexander disposición servicios gratuitos de asistencia lingüística. Llame al [phone].    We comply with applicable federal civil rights laws and [place] laws. We do not discriminate on the basis of race, color, national origin, age, disability, sex, sexual orientation, or  gender identity.            Thank you!     Thank you for choosing [facility]  for your care. Our goal is always to provide you with excellent care. Hearing back from our patients is one way we can continue to improve our services. Please take a few minutes to complete the written survey that you may receive in the mail after your visit with us. Thank you!             Your Updated Medication List - Protect others around you: Learn how to safely use, store and throw away your medicines at www.disposemymeds.org.          This list is accurate as of 6/4/18 10:17 AM.  Always use your most recent med list.                   Brand Name Dispense Instructions for use Diagnosis    ALEVE 220 MG capsule   Generic drug:  naproxen sodium      Take 220 mg by mouth 2 times daily (with meals). PRN        aspirin 81 MG tablet     100    1 TABLET BY MOUTH DAILY    Other and unspecified hyperlipidemia       Calcium + D3 600-200 MG-UNIT Tabs     62 tablet    Take 1 tablet by mouth 2 times daily    Routine general medical examination at a health care facility       cyanocobalamin 1000 MCG tablet    vitamin  B-12    31 tablet    Take 1 tablet (1,000 mcg) by mouth daily    Routine general medical examination at a health care facility       levothyroxine 75 MCG tablet    SYNTHROID    31 tablet    Take 1 tablet (75 mcg) by mouth daily    Hypothyroidism, unspecified type       pyridoxine 100 MG tablet    VITAMIN B-6    31 tablet    Take 1 tablet (100 mg) by mouth daily    Routine general medical examination at a health care facility       simvastatin 10 MG tablet    ZOCOR    31 tablet    Take 1 tablet (10 mg) by mouth At Bedtime    Hyperlipidemia LDL goal <130       TAB-A-MILAD Tabs     31 tablet    TAKE 1 TABLET BY MOUTH ONCE DAILY    Routine general medical examination at a health care facility

## 2018-06-14 NOTE — PROGRESS NOTES
[facility] now requests orders and shares plan of care/discharge summaries for some patients through [employer].  Please REPLY TO THIS MESSAGE OR ROUTE BACK TO THE AUTHOR in order to give authorization for orders when needed.  This is considered a verbal order, you will still receive a faxed copy of orders for signature.  Thank you for your assistance in improving collaboration for our patients.    ORDER- requesting to add 3 more weeks of home PT visits for Rhianna to cont to work on her hip strengthening, gait and balance.    MD SUMMARY/PLAN OF CARE- Rhianna has continued to show impaired balance and gait, and would benefit from continued PT to address these issues as she is a high falls risk.

## 2018-06-20 NOTE — TELEPHONE ENCOUNTER
"Requested Prescriptions   Pending Prescriptions Disp Refills     raloxifene (EVISTA) 60 MG tablet [Pharmacy Med Name: RALOXIFENE TAB 60MG]  Last Written Prescription Date:  08/21/09   ENded:  07/14/11  Last Fill Quantity: 90,  # refills: 3   Last office visit: Department has no specialty with prescribing provider:  06/04/18   Future Office Visit:      11     Sig: TAKE 1 TABLET BY MOUTH ONCE DAILY    Bisphosphonates Failed    6/20/2018  3:13 PM       Failed - Dexa on file within past 2 years    Please review last Dexa result.          Failed - Normal serum creatinine on file within past 12 months    Recent Labs   Lab Test  05/02/17   1005   CR  0.86          Passed - Recent (12 mo) or future (30 days) visit within the authorizing provider's specialty    Patient had office visit in the last 12 months or has a visit in the next 30 days with authorizing provider or within the authorizing provider's specialty.  See \"Patient Info\" tab in inbasket, or \"Choose Columns\" in Meds & Orders section of the refill encounter.           Passed - Patient is age 18 or older          "

## 2018-06-22 NOTE — TELEPHONE ENCOUNTER
Routing refill request to provider for review/approval because:  Labs not current:  Cr  Last DEXA 5/30/11  Last order of DX HIP/PELVIS/SPINE was found on 8/30/2011 from Hospital Encounter on 8/30/2011     Ania HURLEY RN

## 2018-06-25 NOTE — PROGRESS NOTES
[facility] now requests orders and shares plan of care/discharge summaries for some patients through [employer].  Please REPLY TO THIS MESSAGE OR ROUTE BACK TO THE AUTHOR in order to give authorization for orders when needed.  This is considered a verbal order, you will still receive a faxed copy of orders for signature.  Thank you for your assistance in improving collaboration for our patients.    ORDER- requesting verbal order to add OT eval for Rhianna to help with getting her a wheelchair, her mobility is declining and she is becoming unsafe with ambulation and a w/c is recommended for longer distances and eventually will likely need to use it to navigate her facility safely.

## 2018-06-27 NOTE — PATIENT INSTRUCTIONS
We have re done the POLST Today.    Please follow up in one month.    Please stop the raloxifene medication.    Please go to lab today.      Collect urine at the memory care unit.    Thank you for choosing [facility].  You may be receiving a survey in the mail from Alex Avenda[employer] regarding your visit today.  Please take a few minutes to complete and return the survey to let us know how we are doing.      If you have questions or concerns, please contact us via [employer] or you can contact your care team at [phone].    Our Clinic hours are:  Monday 6:40 am  to 7:00 pm  Tuesday -Friday 6:40 am to 5:00 pm    The [place] outpatient lab hours are:  Monday - Friday 6:10 am to 4:45 pm  Saturdays 7:00 am to 11:00 am  Appointments are required, call [phone]    If you have clinical questions after hours or would like to schedule an appointment,  call the clinic at [phone].

## 2018-06-27 NOTE — TELEPHONE ENCOUNTER
Damian Starr of Dr. Olivo's recommendations   Verbal order given for clean catch UA/UC  She verbalized understanding    Merry VIDAL Rn

## 2018-06-27 NOTE — PROGRESS NOTES
SUBJECTIVE:   Rhianna Gamboa is a 85 year old female who presents to clinic today for the following health issues:    Chief Complaint   Patient presents with     Mobility Issues     rapid decline in her mobility. Has been working with PT t her residence and they are alarmed at how quickly her strength has declined.     Edema     noting b/l ankle swelling.     Hearing Problem     complains that she hears music, even when there is none       Patient has had some more weakness and follow at the memory care unit.  Family is not concerned.  Patient has dementia and it is getting worse.  She has no behavior issues.  She is currently in PT and OT. Maybe slight fever at times.  No complaints of cough, sputum production. No complaints of urinary symptoms other than she has wet the bed a couple of times.      Discussed POLST with daughter and son rae today.  They were confused on the options.  Printed and new one and went through the POLST and what Rhianna would have wanted and also what is reasonable.  There was a discussion that patient would not have wanted to be on a ventilator or have lots of measures done.      Discussed also her edema is minimal and also she is hearing voice or music.  Patient does have further progression of dementia.          Problem list and histories reviewed & adjusted, as indicated.  Additional history: as documented        Reviewed and updated as needed this visit by clinical staff  Allergies  Meds       Reviewed and updated as needed this visit by Provider         ROS:  CONSTITUTIONAL:NEGATIVE for fever, chills, change in weight  INTEGUMENTARY/SKIN: NEGATIVE for worrisome rashes, moles or lesions  RESP:NEGATIVE for significant cough or SOB  CV: NEGATIVE for chest pain, palpitations or peripheral edema  : as above  MUSCULOSKELETAL: weakness  NEURO: weakness and dementia  PSYCHIATRIC: bright affect    OBJECTIVE:                                                    /58 (Cuff Size:  "Adult Large)  Pulse 72  Temp 99  F (37.2  C) (Tympanic)  Ht 5' 2.5\" (1.588 m)  Wt 167 lb (75.8 kg)  BMI 30.06 kg/m2  Body mass index is 30.06 kg/(m^2).  GENERAL APPEARANCE: alert, no distress and cooperative  RESP: lungs clear to auscultation - no rales, rhonchi or wheezes  CV: regular rates and rhythm, normal S1 S2, no S3 or S4 and no murmur, click or rub  ABDOMEN: soft, nontender, without hepatosplenomegaly or masses and bowel sounds normal  MS: extremities normal- no gross deformities noted  SKIN: no suspicious lesions or rashes  NEURO: alert and orientated to self. Year  '2008' month 'Feb', season, president and place could not answer and just laughed  PSYCH: pleasant         ASSESSMENT/PLAN:                                                    1. Fever, unspecified fever cause  Low grade and will do work up.  CXR seems to be clear.  Labs pending  - CBC with platelets differential  - TSH  - T4 FREE  - XR Chest 2 Views; Future  - UA reflex to Microscopic and Culture; Future  - Urine Culture Aerobic Bacterial; Future    2. Alzheimer's dementia without behavioral disturbance, unspecified timing of dementia onset    - CBC with platelets differential  - TSH  - T4 FREE  - XR Chest 2 Views; Future  - Basic metabolic panel  - DNR/DNI    3. Advanced directives, counseling/discussion  Completed the polst again, with the wished patient and guardian would have wanted.  DNR/DNI.  Intermediate cares if it can help, reviewed hydration and feeding tubes which they did not want  - DNR/DNI    4. Generalized muscle weakness    - UA reflex to Microscopic and Culture; Future  - Urine Culture Aerobic Bacterial; Future      See Patient Instructions  Counseling/Coordination of care is over 20 min in 40 min appt.      Omar Olivo MD  [facility]  "

## 2018-06-27 NOTE — TELEPHONE ENCOUNTER
Reason for Call:  Other call back    Detailed comments: Ro MCCALL from [facility] calling wondering if ua/uc can be switched to a clean catch instead of cath?    Phone Number Patient can be reached at: Other phone number:  Ro: [phone]    Best Time: any    Can we leave a detailed message on this number? YES    Call taken on 6/27/2018 at 3:11 PM by Cristina Orlando

## 2018-06-27 NOTE — MR AVS SNAPSHOT
After Visit Summary   6/27/2018    Rhianna Gamboa    MRN: [id number]           Patient Information     Date Of Birth          6/19/1933        Visit Information        Provider Department      6/27/2018 9:20 AM Omar Olivo MD [facility]        Today's Diagnoses     Fever, unspecified fever cause    -  1    Alzheimer's dementia without behavioral disturbance, unspecified timing of dementia onset        Advanced directives, counseling/discussion        Generalized muscle weakness          Care Instructions    We have re done the POLST Today.    Please follow up in one month.    Please stop the raloxifene medication.    Please go to lab today.          Thank you for choosing [facility].  You may be receiving a survey in the mail from [employer] [facility] regarding your visit today.  Please take a few minutes to complete and return the survey to let us know how we are doing.      If you have questions or concerns, please contact us via [employer] or you can contact your care team at [phone].    Our Clinic hours are:  Monday 6:40 am  to 7:00 pm  Tuesday -Friday 6:40 am to 5:00 pm    The [place] outpatient lab hours are:  Monday - Friday 6:10 am to 4:45 pm  Saturdays 7:00 am to 11:00 am  Appointments are required, call [phone]    If you have clinical questions after hours or would like to schedule an appointment,  call the clinic at [phone].            Follow-ups after your visit        Follow-up notes from your care team     Return in about 4 weeks (around 7/25/2018).      Your next 10 appointments already scheduled     Jul 30, 2018 11:00 AM CDT   Office Visit with Omar Olivo MD   [facility]    [address] [address]   [phone]           Bring a current list of meds and any records pertaining to this visit. For Physicals, please bring immunization records and any forms needing to be filled out. Please  "arrive 10 minutes early to complete paperwork.              Future tests that were ordered for you today     Open Future Orders        Priority Expected Expires Ordered    UA reflex to Microscopic and Culture Routine  7/27/2018 6/27/2018    Urine Culture Aerobic Bacterial Routine  7/27/2018 6/27/2018            Who to contact     If you have questions or need follow up information about today's clinic visit or your schedule please contact [facility] directly at [phone].  Normal or non-critical lab and imaging results will be communicated to you by MyChart, letter or phone within 4 business days after the clinic has received the results. If you do not hear from us within 7 days, please contact the clinic through MyChart or phone. If you have a critical or abnormal lab result, we will notify you by phone as soon as possible.  Submit refill requests through [employer] or call your pharmacy and they will forward the refill request to us. Please allow 3 business days for your refill to be completed.          Additional Information About Your Visit        Care EveryWhere ID     This is your Care EveryWhere ID. This could be used by other organizations to access your [place] medical records  [id number]        Your Vitals Were     Pulse Temperature Height BMI (Body Mass Index)          72 99  F (37.2  C) (Tympanic) 5' 2.5\" (1.588 m) 30.06 kg/m2         Blood Pressure from Last 3 Encounters:   06/27/18 108/58   06/04/18 118/66   05/01/18 127/75    Weight from Last 3 Encounters:   06/27/18 167 lb (75.8 kg)   06/04/18 167 lb (75.8 kg)   05/01/18 163 lb (73.9 kg)              We Performed the Following     Basic metabolic panel     CBC with platelets differential     DNR/DNI     T4 FREE     TSH        Primary Care Provider Office Phone # Fax #    Omar Olivo [phone] [address] [address]        Equal Access to Services     SARAH DAVIDSON AH: Mian stein " Sotimo, waharryda luqadaha, qaybta kaalmada kevin, walter carter harrythao laKwamejaelyn irene. So [facility] [phone].    ATENCIÓN: Si wes au, tiene a alexander disposición servicios gratuitos de asistencia lingüística. Jacinta al [phone].    We comply with applicable federal civil rights laws and [place] laws. We do not discriminate on the basis of race, color, national origin, age, disability, sex, sexual orientation, or gender identity.            Thank you!     Thank you for choosing [facility]  for your care. Our goal is always to provide you with excellent care. Hearing back from our patients is one way we can continue to improve our services. Please take a few minutes to complete the written survey that you may receive in the mail after your visit with us. Thank you!             Your Updated Medication List - Protect others around you: Learn how to safely use, store and throw away your medicines at www.disposemymeds.org.          This list is accurate as of 6/27/18 10:49 AM.  Always use your most recent med list.                   Brand Name Dispense Instructions for use Diagnosis    ALEVE 220 MG capsule   Generic drug:  naproxen sodium      Take 220 mg by mouth 2 times daily (with meals). PRN        aspirin 81 MG tablet     100    1 TABLET BY MOUTH DAILY    Other and unspecified hyperlipidemia       Calcium + D3 600-200 MG-UNIT Tabs     62 tablet    Take 1 tablet by mouth 2 times daily    Osteoporosis, unspecified osteoporosis type, unspecified pathological fracture presence       cyanocobalamin 1000 MCG tablet    vitamin  B-12    31 tablet    Take 1 tablet (1,000 mcg) by mouth daily    Alzheimer's dementia without behavioral disturbance, unspecified timing of dementia onset       levothyroxine 75 MCG tablet    SYNTHROID    31 tablet    Take 1 tablet (75 mcg) by mouth daily    Hypothyroidism, unspecified type       pyridoxine 100 MG tablet    VITAMIN B-6    31 tablet    Take 1 tablet (100  mg) by mouth daily    Alzheimer's dementia without behavioral disturbance, unspecified timing of dementia onset       simvastatin 10 MG tablet    ZOCOR    31 tablet    Take 1 tablet (10 mg) by mouth At Bedtime    Hyperlipidemia LDL goal <130       TAB-A-MILAD Tabs     31 tablet    TAKE 1 TABLET BY MOUTH ONCE DAILY    Routine general medical examination at a health care facility

## 2018-06-27 NOTE — TELEPHONE ENCOUNTER
Called [facility] phone rang several times, no answer  Called Ro Rn: [phone] mailbox was not set up    Result notes 6/27/18:  Please call daughter and [facility] unit,   The radiologist on their screens saw what could be a streak of the beginning of an infiltrate on her chest xray.   Recommend to put Rhianna on zithromax 250 mg take two tabs today, then one tab once a day for 4 days.  No refill.  Just incase of the start of a pneumonia.   Patient lives at [facility].   Check with them on where the prescription should be sent.   Omar Olivo MD   Family Medicine     Daughter of patient was called, see phone note today  Rx needs to be called in to pharmacy for patient at [facility]    Merry VIDAL Rn

## 2018-06-28 NOTE — TELEPHONE ENCOUNTER
Spoke with CAL Nguyen at [facility] in [place] [phone]  Allergies reviewed and Z-Philippe ordered as written below and E-Prescribed to [facility], phone, [phone].  Fax orders to [facility]: ([phone]    Pt's daughter Shaquille notified (Consent to Comm on file).    Ania HURLEY RN

## 2018-06-29 NOTE — TELEPHONE ENCOUNTER
Left message for patient's daughter to return call to clinic.  Cipro ready - what pharmacy?    Patient is resident at [facility] in [place] [phone] - [facility].    Karolina VIDAL RN

## 2018-06-29 NOTE — TELEPHONE ENCOUNTER
Please call the assisted living and also the daughter,  Rhianna's urine could have a bladder infection going on.  Please call in Cipro 250 mg twice a day for 7 days to the pharmacy for her living situation, no refill.  Patient has dementia. The urine culture was still pending from what I know.  I have only a fax of the urine analysis that was abnormal and the zithromax will not cover for bladder infections.  Omar Olivo MD  Family Medicine

## 2018-06-29 NOTE — TELEPHONE ENCOUNTER
Daughter notified.  She is not certain of pharmacy.    Will wait for [facility] to return call.  Karolina VIDAL RN

## 2018-07-02 NOTE — TELEPHONE ENCOUNTER
[facility] call:  1.  Please send Rx to [facility]  2.  Please fax order for med to [facility] @ Fax# [phone]

## 2018-07-03 NOTE — PROGRESS NOTES
[facility] now requests orders and shares plan of care/discharge summaries for some patients through [employer].  Please REPLY TO THIS MESSAGE OR ROUTE BACK TO THE AUTHOR in order to give authorization for orders when needed.  This is considered a verbal order, you will still receive a faxed copy of orders for signature.  Thank you for your assistance in improving collaboration for our patients.    ORDER- Requesting Social Work order for community resources, possible information on hospice program/benefits.

## 2018-07-06 NOTE — PROGRESS NOTES
[facility] now requests orders and shares plan of care/discharge summaries for some patients through [employer].  Please REPLY TO THIS MESSAGE OR ROUTE BACK TO THE AUTHOR in order to give authorization for orders when needed.  This is considered a verbal order, you will still receive a faxed copy of orders for signature.  Thank you for your assistance in improving collaboration for our patients.    ORDER- requesting verbal order to cont home PT once a week for 4 more weeks to work on gait, balance and functional stregthening.    MD SUMMARY/PLAN OF CARE- Rhianna was able to walk today a short distance but is still using the w/c for primary means of mobility, has had multiple falls.

## 2018-07-19 NOTE — TELEPHONE ENCOUNTER
Reason for call:  Patient reporting a symptom    Symptom or request: cough    Duration (how long have symptoms been present): on going    Have you been treated for this before? Yes    Additional comments: Amrita calling from [facility] stating the pt has had increased coughing and was running a low temp (99) last night. She listened to her lungs and said it sounded like a bubbler. She is suspecting aspiration from coughing at meals and was wondering if they can get an order for speech eval. She states they can do xrays and labs at the facility if you think she needs this. She doesn't have an appt until 7-30-18 and she is concerned about her waiting so long.    Phone Number patient can be reached at:  Other phone number:  Amrita [phone] or [phone]    Best Time:  any    Can we leave a detailed message on this number:  YES    Call taken on 7/19/2018 at 11:27 AM by Nhung Winters

## 2018-07-20 NOTE — TELEPHONE ENCOUNTER
Nurse, Lana,  informed of message below from provider.      S-(situation): Call to [facility], spoke with Nurse Liudmila, patient is afebrile with left lower lobe crackle.     B-(background): Hx of Cough and infiltrate to left lower lobe.     A-(assessment): VS: T 97, P 80, R 20 BP: 127/76, Sat 94% RA. LS: crackles to left lower lobe.  Nurse reports temp last night was 97.5, afebrile today as well.    R-(recommendations): nurse Lana asks if ok to just have a portable x-ray at Assisted Living facility as daughter cannot bring patient to clinic today.     Nurse will fax over request to clinic.

## 2018-07-20 NOTE — TELEPHONE ENCOUNTER
Form from [facility]-Physician Order Sheet. Orders were signed, faxed and sent to scanning.    [facility]

## 2018-07-20 NOTE — TELEPHONE ENCOUNTER
Attempted to contact nurse from [facility], left voice message to call back.

## 2018-07-20 NOTE — TELEPHONE ENCOUNTER
Fax received with report from Assisted Living facility nurse. Placed in providers basket for review and advisement.

## 2018-07-20 NOTE — TELEPHONE ENCOUNTER
Ordered CXR.  If there is a pneumonia she needs to be seen and evaluated.  Omar Olivo MD  Family Medicine

## 2018-07-20 NOTE — TELEPHONE ENCOUNTER
Since she is symptomatic she needs to be evaluated to make sure there is not a pneumonia developing.  This should not wait.    Please do a speech evaluation to see how she is swallowing.  Omar Olivo MD  Family Medicine

## 2018-07-23 NOTE — ED AVS SNAPSHOT
[facility] Emergency Department    [address] [address]    Phone:  [phone]    Fax:  [phone]                                       Rhianna Gamboa   MRN: [id number]    Department:  [facility] Emergency Department   Date of Visit:  7/23/2018           After Visit Summary Signature Page     I have received my discharge instructions, and my questions have been answered. I have discussed any challenges I see with this plan with the nurse or doctor.    ..........................................................................................................................................  Patient/Patient Representative Signature      ..........................................................................................................................................  Patient Representative Print Name and Relationship to Patient    ..................................................               ................................................  Date                                            Time    ..........................................................................................................................................  Reviewed by Signature/Title    ...................................................              ..............................................  Date                                                            Time

## 2018-07-23 NOTE — ED AVS SNAPSHOT
[facility] Emergency Department    [address] [address]    Phone:  [phone]    Fax:  [phone]                                       Rhianna Gamboa   MRN: [id number]    Department:  [facility] Emergency Department   Date of Visit:  7/23/2018           Patient Information     Date Of Birth          6/19/1933        Your diagnoses for this visit were:     Fall, initial encounter     Closed head injury, initial encounter     Laceration of scalp, initial encounter        You were seen by Dean Calero MD.      Follow-up Information     Schedule an appointment as soon as possible for a visit with Omar Olivo MD.    Specialty:  Family Practice    Why:  For suture removal    Contact information:    [address] [address]  [phone]          Discharge Instructions       Tylenol as needed for pain.  Removal of staples in 14 days.  Return to the emergency department if worse or changes.    Your next 10 appointments already scheduled     Jul 30, 2018 11:00 AM CDT   Office Visit with [facility]ivo MD   [facility]    [address] [id number] [phone]           Bring a current list of meds and any records pertaining to this visit. For Physicals, please bring immunization records and any forms needing to be filled out. Please arrive 10 minutes early to complete paperwork.              24 Hour Appointment Hotline       To make an appointment at any [facility], call [phone] ([phone]). If you don't have a family doctor or clinic, we will help you find one. [facility] are conveniently located to serve the needs of you and your family.             Review of your medicines      Our records show that you are taking the medicines listed below. If these are incorrect, please call your family doctor or clinic.        Dose / Directions Last dose taken    ALEVE 220 MG  capsule   Dose:  220 mg   Generic drug:  naproxen sodium        Take 220 mg by mouth 2 times daily (with meals). PRN   Refills:  0        amoxicillin-clavulanate 875-125 MG per tablet   Commonly known as:  AUGMENTIN   Dose:  1 tablet   Quantity:  14 tablet        Take 1 tablet by mouth 2 times daily for 7 days   Refills:  0        aspirin 81 MG tablet   Quantity:  100        1 TABLET BY MOUTH DAILY   Refills:  3        Calcium + D3 600-200 MG-UNIT Tabs   Dose:  1 tablet   Quantity:  62 tablet        Take 1 tablet by mouth 2 times daily   Refills:  11        cyanocobalamin 1000 MCG tablet   Commonly known as:  vitamin  B-12   Dose:  1000 mcg   Quantity:  31 tablet        Take 1 tablet (1,000 mcg) by mouth daily   Refills:  11        levothyroxine 75 MCG tablet   Commonly known as:  SYNTHROID   Dose:  75 mcg   Quantity:  31 tablet        Take 1 tablet (75 mcg) by mouth daily   Refills:  11        pyridoxine 100 MG tablet   Commonly known as:  VITAMIN B-6   Dose:  100 mg   Quantity:  31 tablet        Take 1 tablet (100 mg) by mouth daily   Refills:  11        simvastatin 10 MG tablet   Commonly known as:  ZOCOR   Dose:  10 mg   Quantity:  31 tablet        Take 1 tablet (10 mg) by mouth At Bedtime   Refills:  11        TAB-A-MILAD Tabs   Quantity:  31 tablet        TAKE 1 TABLET BY MOUTH ONCE DAILY   Refills:  11                Procedures and tests performed during your visit     Basic metabolic panel    CBC with platelets differential    CT Cervical Spine w/o Contrast    CT Head w/o Contrast      Orders Needing Specimen Collection     None      Pending Results     No orders found from 7/21/2018 to 7/24/2018.            Pending Culture Results     No orders found from 7/21/2018 to 7/24/2018.            Pending Results Instructions     If you had any lab results that were not finalized at the time of your Discharge, you can call the ED Lab Result RN at [phone]. You will be contacted by this team for any positive Lab  results or changes in treatment. The nurses are available 7 days a week from 10A to 6:30P.  You can leave a message 24 hours per day and they will return your call.        Test Results From Your Hospital Stay        7/23/2018  9:05 PM      Component Results     Component Value Ref Range & Units Status    WBC 8.7 4.0 - 11.0 10e9/L Final    RBC Count 4.10 3.8 - 5.2 10e12/L Final    Hemoglobin 13.4 11.7 - 15.7 g/dL Final    Hematocrit 41.2 35.0 - 47.0 % Final     (H) 78 - 100 fl Final    MCH 32.7 26.5 - 33.0 pg Final    MCHC 32.5 31.5 - 36.5 g/dL Final    RDW 13.5 10.0 - 15.0 % Final    Platelet Count 240 150 - 450 10e9/L Final    Diff Method Automated Method  Final    % Neutrophils 62.0 % Final    % Lymphocytes 20.2 % Final    % Monocytes 12.1 % Final    % Eosinophils 4.8 % Final    % Basophils 0.3 % Final    % Immature Granulocytes 0.6 % Final    Nucleated RBCs 0 0 /100 Final    Absolute Neutrophil 5.4 1.6 - 8.3 10e9/L Final    Absolute Lymphocytes 1.8 0.8 - 5.3 10e9/L Final    Absolute Monocytes 1.1 0.0 - 1.3 10e9/L Final    Absolute Eosinophils 0.4 0.0 - 0.7 10e9/L Final    Absolute Basophils 0.0 0.0 - 0.2 10e9/L Final    Abs Immature Granulocytes 0.1 0 - 0.4 10e9/L Final    Absolute Nucleated RBC 0.0  Final         7/23/2018  9:43 PM      Component Results     Component Value Ref Range & Units Status    Sodium 136 133 - 144 mmol/L Final    Potassium 3.8 3.4 - 5.3 mmol/L Final    Chloride 103 94 - 109 mmol/L Final    Carbon Dioxide 29 20 - 32 mmol/L Final    Anion Gap 4 3 - 14 mmol/L Final    Glucose 104 (H) 70 - 99 mg/dL Final    Urea Nitrogen 16 7 - 30 mg/dL Final    Creatinine 0.78 0.52 - 1.04 mg/dL Final    GFR Estimate 70 >60 mL/min/1.7m2 Final    Non  GFR Calc    GFR Estimate If Black 85 >60 mL/min/1.7m2 Final    African American GFR Calc    Calcium 8.6 8.5 - 10.1 mg/dL Final         7/23/2018  9:46 PM      Narrative     CT SCAN OF THE HEAD WITHOUT CONTRAST   7/23/2018 8:48 PM      HISTORY: Fall, closed head injury with scalp laceration, unwitnessed,  dementia.     TECHNIQUE:  Axial images of the head and coronal reformations without  IV contrast material. Radiation dose for this scan was reduced using  automated exposure control, adjustment of the mA and/or kV according  to patient size, or iterative reconstruction technique.    COMPARISON: 6/29/2017.    FINDINGS:  There is generalized atrophy of the brain.  There is low  attenuation in the white matter of the cerebral hemispheres consistent  with sequelae of small vessel ischemic disease. There is no evidence  of intracranial hemorrhage, mass, acute infarct or anomaly.     The visualized portions of the sinuses and mastoids appear normal.  There is right parietal scalp soft tissue swelling with no underlying  fracture.        Impression     IMPRESSION:   1. Right parietal scalp soft tissue swelling. No underlying fracture  or intracranial trauma.  2. Atrophy of the brain.  White matter changes consistent with  sequelae of small vessel ischemic disease. This is unchanged.    NIDIA ROCHE MD         7/23/2018  9:46 PM      Narrative     CT CERVICAL SPINE WITHOUT CONTRAST   7/23/2018 8:49 PM     HISTORY: Neck pain after trauma.      TECHNIQUE: Axial images of the cervical spine were obtained without  intravenous contrast. Multiplanar reformations were performed.   Radiation dose for this scan was reduced using automated exposure  control, adjustment of the mA and/or kV according to patient size, or  iterative reconstruction technique.    COMPARISON: 6/29/2017.    FINDINGS: There is no evidence of fracture. There is degeneration of  medial atlantoaxial joint with adjacent thickening behind the  odontoid.There is multilevel degenerative disc disease especially at  C5-C6 and C6-C7. There is multilevel degenerative facet arthropathy.  There is acquired fusion anteriorly and posteriorly at C3-C4 and  bilaterally posteriorly at C2-C3. There is  reversal of lordosis. There  is grade 1 subluxation of C4 on C5. There is no significant spinal  canal stenosis. Paraspinous soft tissues appear normal. Common carotid  arteries and internal carotid arteries are tortuous.        Impression     IMPRESSION:  1. No evidence of acute trauma.  2. Degenerative changes, reversal of lordosis and subluxation at C4-C5  as described above.  3. No change.     NIDIA ROCHE MD                Thank you for choosing [facility]       Thank you for choosing [facility] for your care. Our goal is always to provide you with excellent care. Hearing back from our patients is one way we can continue to improve our services. Please take a few minutes to complete the written survey that you may receive in the mail after you visit with us. Thank you!        Care EveryWhere ID     This is your Care EveryWhere ID. This could be used by other organizations to access your [facility] medical records  [id number]        Equal Access to Services     SARAH DAVIDSON : Mian Shah, roby adams, kelly brown, walter bonilla. So [facility] [phone].    ATENCIÓN: Si habla español, tiene a alexander disposición servicios gratuitos de asistencia lingüística. Llame al [phone].    We comply with applicable federal civil rights laws and [place] laws. We do not discriminate on the basis of race, color, national origin, age, disability, sex, sexual orientation, or gender identity.            After Visit Summary       This is your record. Keep this with you and show to your community pharmacist(s) and doctor(s) at your next visit.

## 2018-07-23 NOTE — MR AVS SNAPSHOT
After Visit Summary   7/23/2018    Rhianna Gamboa    MRN: [id number]           Patient Information     Date Of Birth          6/19/1933        Visit Information        Provider Department      7/23/2018 1:00 PM Tammy Fernandes APRN CNP [facility]        Today's Diagnoses     Cough    -  1      Care Instructions    Please tell staff to observe patient for possible swallowing problems, see if she is cough, choking when eating, if yes recommend swallow study eval    Chest Xray    Mucinex  mg twice daily as needed for cough    Start Augmentin 1 tablet twice daily for 7 days                 Follow-ups after your visit        Your next 10 appointments already scheduled     Jul 30, 2018 11:00 AM CDT   Office Visit with Omar Olivo MD   [facility]    [address] [address] [phone]           Bring a current list of meds and any records pertaining to this visit. For Physicals, please bring immunization records and any forms needing to be filled out. Please arrive 10 minutes early to complete paperwork.              Future tests that were ordered for you today     Open Future Orders        Priority Expected Expires Ordered    XR Chest 2 Views Routine 7/23/2018 7/23/2019 7/23/2018            Who to contact     If you have questions or need follow up information about today's clinic visit or your schedule please contact [facility] directly at [phone].  Normal or non-critical lab and imaging results will be communicated to you by MyChart, letter or phone within 4 business days after the clinic has received the results. If you do not hear from us within 7 days, please contact the clinic through MyChart or phone. If you have a critical or abnormal lab result, we will notify you by phone as soon as possible.  Submit refill requests through [employer] or call your pharmacy and they will forward the  "refill request to us. Please allow 3 business days for your refill to be completed.          Additional Information About Your Visit        Care EveryWhere ID     This is your Care EveryWhere ID. This could be used by other organizations to access your [facility] medical records  [id number]        Your Vitals Were     Pulse Temperature Height Pulse Oximetry BMI (Body Mass Index)       78 98.5  F (36.9  C) (Tympanic) 5' 2.5\" (1.588 m) 93% 30.06 kg/m2        Blood Pressure from Last 3 Encounters:   07/23/18 106/58   06/27/18 108/58   06/04/18 118/66    Weight from Last 3 Encounters:   07/23/18 167 lb (75.8 kg)   06/27/18 167 lb (75.8 kg)   06/04/18 167 lb (75.8 kg)                 Today's Medication Changes          These changes are accurate as of 7/23/18  1:34 PM.  If you have any questions, ask your nurse or doctor.               Start taking these medicines.        Dose/Directions    amoxicillin-clavulanate 875-125 MG per tablet   Commonly known as:  AUGMENTIN   Used for:  Cough        Dose:  1 tablet   Take 1 tablet by mouth 2 times daily for 7 days   Quantity:  14 tablet   Refills:  0            Where to get your medicines      These medications were sent to [facility] - [address] [address]     Phone:  [phone]     amoxicillin-clavulanate 875-125 MG per tablet                Primary Care Provider Office Phone # Fax #    Omar Olivo [phone] [address] [address]        Equal Access to Services     [facility] LETY AH: Hadpeyman Shah, waharryda luarun, qaybtravis palmalwalter watson. So [facility] [phone].    ATENCIÓN: Si habla español, tiene a alexander disposición servicios gratuitos de asistencia lingüística. Llame al [phone].    We comply with applicable federal civil rights laws and [place] laws. We do not discriminate on the " basis of race, color, national origin, age, disability, sex, sexual orientation, or gender identity.            Thank you!     Thank you for choosing [facility]  for your care. Our goal is always to provide you with excellent care. Hearing back from our patients is one way we can continue to improve our services. Please take a few minutes to complete the written survey that you may receive in the mail after your visit with us. Thank you!             Your Updated Medication List - Protect others around you: Learn how to safely use, store and throw away your medicines at www.disposemymeds.org.          This list is accurate as of 7/23/18  1:34 PM.  Always use your most recent med list.                   Brand Name Dispense Instructions for use Diagnosis    ALEVE 220 MG capsule   Generic drug:  naproxen sodium      Take 220 mg by mouth 2 times daily (with meals). PRN        amoxicillin-clavulanate 875-125 MG per tablet    AUGMENTIN    14 tablet    Take 1 tablet by mouth 2 times daily for 7 days    Cough       aspirin 81 MG tablet     100    1 TABLET BY MOUTH DAILY    Other and unspecified hyperlipidemia       Calcium + D3 600-200 MG-UNIT Tabs     62 tablet    Take 1 tablet by mouth 2 times daily    Osteoporosis, unspecified osteoporosis type, unspecified pathological fracture presence       cyanocobalamin 1000 MCG tablet    vitamin  B-12    31 tablet    Take 1 tablet (1,000 mcg) by mouth daily    Alzheimer's dementia without behavioral disturbance, unspecified timing of dementia onset       levothyroxine 75 MCG tablet    SYNTHROID    31 tablet    Take 1 tablet (75 mcg) by mouth daily    Hypothyroidism, unspecified type       pyridoxine 100 MG tablet    VITAMIN B-6    31 tablet    Take 1 tablet (100 mg) by mouth daily    Alzheimer's dementia without behavioral disturbance, unspecified timing of dementia onset       simvastatin 10 MG tablet    ZOCOR    31 tablet    Take 1 tablet (10 mg) by mouth At Bedtime     Hyperlipidemia LDL goal <130       TAB-A-MILAD Tabs     31 tablet    TAKE 1 TABLET BY MOUTH ONCE DAILY    Routine general medical examination at a health care facility

## 2018-07-23 NOTE — PROGRESS NOTES
SUBJECTIVE:   Rhianna Gamboa is a 85 year old female who presents to clinic today with her son and daughter for the following health issues: productive cough for over a week. Patient family members concern about possible aspiration pneumonia, they did not observe that patient have swallowing problems, but states one provider mentioned possible aspiration pneumonia when patient was diagnosed with LLL pneumonia about a month ago.     ENT Symptoms             Symptoms: cc Present Absent Comment   Fever/Chills   x    Fatigue   x    Muscle Aches   x    Eye Irritation   x    Sneezing   x    Nasal Angel/Drg   x    Sinus Pressure/Pain   x    Loss of smell   x    Dental pain   x    Sore Throat   x    Swollen Glands       Ear Pain/Fullness   x    Cough  x  Has been coughing throughout the day, wet and productive cough    Wheeze   x    Chest Pain   x    Shortness of breath   x    Rash   x    Other    Recent diagnosis of pneumonia of the left lower lobe on 6/27/18 with infiltrates seen on CXR. At that time given azithromycin which improved symptoms.     Daughter reports concerns that she has difficulty swallowing sometimes when eating and concerns of future aspiration.      Symptom duration:  Since last week    Symptom severity:  Mild    Treatments tried: None    Contacts: Lives in nursing home      Problem list and histories reviewed & adjusted, as indicated.  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)     Alzheimer's dementia without behavioral disturbance, unspecified timing of dementia onset     Past Surgical History:   Procedure Laterality Date     AMPUTATE FINGER(S) Right 1/20/2016    Procedure: AMPUTATE FINGER(S);  Surgeon: Brandon Rollins MD;  Location:  OR      BIOPSY NODE SENTINEL N/A 1/20/2016    Procedure: BIOPSY NODE SENTINEL;  Surgeon: Brandon Rollins MD;  Location: [facility] OR     EYE SURGERY  7/2009    right cataract     EYE SURGERY  2010    both eyes     RELEASE CARPAL TUNNEL Right 5/12/2015    Procedure: RELEASE CARPAL TUNNEL;  Surgeon: Omar Balderrama MD;  Location: [facility] OR       Social History   Substance Use Topics     Smoking status: Never Smoker     Smokeless tobacco: Never Used     Alcohol use No     Family History   Problem Relation Age of Onset     C.A.D. Mother      C.A.D. Father      Cancer Maternal Grandmother      bowel cancer     C.A.D. Maternal Grandfather      Diabetes Sister      Cerebrovascular Disease Sister      HEART DISEASE Sister      HEART DISEASE Sister      HEART DISEASE Son      Breast Cancer Daughter          Current Outpatient Prescriptions   Medication Sig Dispense Refill     amoxicillin-clavulanate (AUGMENTIN) 875-125 MG per tablet Take 1 tablet by mouth 2 times daily for 7 days 14 tablet 0     ASPIRIN 81 MG OR TABS 1 TABLET BY MOUTH DAILY 100 3     Calcium Carb-Cholecalciferol (CALCIUM + D3) 600-200 MG-UNIT TABS Take 1 tablet by mouth 2 times daily 62 tablet 11     cyanocobalamin (VITAMIN  B-12) 1000 MCG tablet Take 1 tablet (1,000 mcg) by mouth daily 31 tablet 11     levothyroxine (SYNTHROID) 75 MCG tablet Take 1 tablet (75 mcg) by mouth daily 31 tablet 11     Multiple Vitamin (TAB-A-MILAD) TABS TAKE 1 TABLET BY MOUTH ONCE DAILY 31 tablet 11     pyridoxine (VITAMIN B-6) 100 MG tablet Take 1 tablet (100 mg) by mouth daily 31 tablet 11     simvastatin (ZOCOR) 10 MG tablet Take 1 tablet (10 mg) by mouth At Bedtime 31 tablet 11     Naproxen Sodium (ALEVE) 220 MG capsule Take 220 mg by mouth 2 times daily (with meals). PRN         Reviewed and updated as needed this visit by clinical staff  Allergies       Reviewed and updated as needed this visit by Provider         ROS:  Constitutional, HEENT, cardiovascular, pulmonary, gi and gu systems  "are negative, except as otherwise noted.    OBJECTIVE:     /58  Pulse 78  Temp 98.5  F (36.9  C) (Tympanic)  Ht 5' 2.5\" (1.588 m)  Wt 167 lb (75.8 kg)  SpO2 93%  BMI 30.06 kg/m2  Body mass index is 30.06 kg/(m^2).  GENERAL: healthy, alert and no distress  RESP: crackles auscultated of the left lower lobe   CV: regular rate and rhythm, normal S1 S2, no S3 or S4, no murmur, click or rub, mild nonpitting peripheral edema and peripheral pulses strong  PSYCH: mildly disoriented, affect normal and appearance well groomed    Diagnostic Test Results:  No results found for this or any previous visit (from the past 24 hour(s)).    ASSESSMENT/PLAN:     Cough    -Due to duration of symptoms > 1 week and crackles auscultated on exam recommend treatment with antibiotics. Augmentin to cover possible aspiration pneumonia given history of difficulty swallowing. Consider future swallow study.   -OTC Robitussin DM as needed for cough   Plan: amoxicillin-clavulanate (AUGMENTIN) 875-125 MG         per tablet, XR Chest 2 Views  -follow up in 1 week    See Patient Instructions. Follow up appointment in 1 week, sooner if symptoms worsen.     JENNY [facility]  "

## 2018-07-23 NOTE — PATIENT INSTRUCTIONS
Please tell staff to observe patient for possible swallowing problems, see if she is coughing, choking when eating, if yes recommend swallow study eval    Chest Xray    Mucinex  mg twice daily as needed for cough    Start Augmentin 1 tablet twice daily for 7 days

## 2018-07-24 NOTE — ED PROVIDER NOTES
History     Chief Complaint   Patient presents with     Fall     head lac, unwittnessed fall     HPI  Rhianna Gamboa is a 85 year old female, past medical history significant for Alzheimer's dementia, malignant melanoma, hyperlipidemia, anemia, osteoporosis, hypothyroidism, presents to the emergency department by EMS with concerns of an unwitnessed fall and head laceration.  Scant history is available from the patient who has significant dementia, paperwork accompanying the patient documents DNR/DNI status, per paramedics the patient had an unwitnessed fall at her assisted living/memory care unit [facility] and presents for evaluation.  She was apparently found in the bathroom of her room lying on the floor with blood surrounding her head and apparent scalp laceration.  Direct pressure was applied the patient was assisted into the ambulance and brought here.  The patient herself indicates pain in her posterior scalp area only.  On direct questioning she denies pain in her neck chest back abdomen or extremities.      Problem List:    Patient Active Problem List    Diagnosis Date Noted     Alzheimer's dementia without behavioral disturbance, unspecified timing of dementia onset 04/24/2018     Priority: Medium     Malignant melanoma of finger of right hand (H) 01/07/2016     Priority: Medium     Advance Care Planning 01/15/2013     Priority: Medium     Advance Care Planning 8/22/2016: ACP Facilitation Session:    Rhianna Gamboa presented for ACP Facilitation session at a group session. She was accompanied by daughter. [employer] information provided and resources reviewed. She currently wishes to give additional consideration to ACP  She currently has the following questions or concerns about Advance Care Planning: none known.  Confirmed/documented legally designated decision maker(s).  Added by Purvi Albright RN, Advance Care Planning Liaison.  Advance Care Planning 4/29/2015 Patient states  she does not have an advanced directive. Copy of [employer] Advanced directive given to patient for review.  Advance Care Planning 1/15/2013: Patient states has Advance Directive and will bring in a copy to clinic.        Allergic rhinitis 09/27/2012     Priority: Medium     CTS (carpal tunnel syndrome) 03/14/2012     Priority: Medium     HYPERLIPIDEMIA LDL GOAL <130 10/31/2010     Priority: Medium     Anemia 08/14/2008     Priority: Medium     Osteoporosis 03/06/2007     Priority: Medium     Problem list name updated by automated process. Provider to review       Hypothyroidism 03/07/2006     Priority: Medium     Problem list name updated by automated process. Provider to review       Cough 03/07/2006     Priority: Medium     chronic and dry for years          Past Medical History:    Past Medical History:   Diagnosis Date     Basal cell carcinoma      Malignant melanoma (H)        Past Surgical History:    Past Surgical History:   Procedure Laterality Date     AMPUTATE FINGER(S) Right 1/20/2016    Procedure: AMPUTATE FINGER(S);  Surgeon: Brandon Rollins MD;  Location: [facility] OR     BIOPSY NODE SENTINEL N/A 1/20/2016    Procedure: BIOPSY NODE SENTINEL;  Surgeon: Brandon Rollins MD;  Location:  OR     EYE SURGERY  7/2009    right cataract     EYE SURGERY  2010    both eyes     RELEASE CARPAL TUNNEL Right 5/12/2015    Procedure: RELEASE CARPAL TUNNEL;  Surgeon: Omar Balderrama MD;  Location: [facility] OR       Family History:    Family History   Problem Relation Age of Onset     C.A.D. Mother      C.A.D. Father      Cancer Maternal Grandmother      bowel cancer     C.A.D. Maternal Grandfather      Diabetes Sister      Cerebrovascular Disease Sister      HEART DISEASE Sister      HEART DISEASE Sister      HEART DISEASE Son      Breast Cancer Daughter        Social History:  Marital Status:   [5]  Social History   Substance Use Topics     Smoking status: Never Smoker     Smokeless tobacco: Never Used      Alcohol use No        Medications:      ASPIRIN 81 MG OR TABS   Calcium Carb-Cholecalciferol (CALCIUM + D3) 600-200 MG-UNIT TABS   cyanocobalamin (VITAMIN  B-12) 1000 MCG tablet   levothyroxine (SYNTHROID) 75 MCG tablet   Multiple Vitamin (TAB-A-MILAD) TABS   pyridoxine (VITAMIN B-6) 100 MG tablet   simvastatin (ZOCOR) 10 MG tablet   amoxicillin-clavulanate (AUGMENTIN) 875-125 MG per tablet   Naproxen Sodium (ALEVE) 220 MG capsule         Review of Systems   Unable to perform ROS: Dementia       Physical Exam   BP: 146/87  Pulse: 87  Temp: 98.3  F (36.8  C)  Resp: 20  SpO2: 95 %      Physical Exam   Constitutional: She appears well-developed and well-nourished.   Oriented ×1 only.  Pleasant   HENT:   Head: Normocephalic.   Right Ear: External ear normal.   Left Ear: External ear normal.   Nose: Nose normal.   Mouth/Throat: Oropharynx is clear and moist.   5 cm right parietal scalp area laceration.  Slow ooze of blood.  No arterial bleeding   Eyes: Conjunctivae and EOM are normal. Pupils are equal, round, and reactive to light.   Neck:   Denies neck pain however tender to palpation the right paraspinal area.  Equivocal tenderness/facial grimacing with palpation midline C-spine around C3-C4 level.   Cardiovascular: Normal rate, regular rhythm, normal heart sounds and intact distal pulses.    Pulmonary/Chest: Effort normal and breath sounds normal.   Abdominal: Soft. Bowel sounds are normal.   Musculoskeletal: Normal range of motion.   Neurological: She is alert.   Skin: Skin is warm and dry.   Psychiatric: She has a normal mood and affect. Her behavior is normal.   Nursing note and vitals reviewed.      ED Course     ED Course     Procedures             Labs Ordered and Resulted from Time of ED Arrival Up to the Time of Departure from the ED   CBC WITH PLATELETS DIFFERENTIAL - Abnormal; Notable for the following:        Result Value     (*)     All other components within normal limits   BASIC METABOLIC PANEL -  Abnormal; Notable for the following:     Glucose 104 (*)     All other components within normal limits     Results for orders placed or performed during the hospital encounter of 07/23/18   CT Head w/o Contrast    Narrative    CT SCAN OF THE HEAD WITHOUT CONTRAST   7/23/2018 8:48 PM     HISTORY: Fall, closed head injury with scalp laceration, unwitnessed,  dementia.     TECHNIQUE:  Axial images of the head and coronal reformations without  IV contrast material. Radiation dose for this scan was reduced using  automated exposure control, adjustment of the mA and/or kV according  to patient size, or iterative reconstruction technique.    COMPARISON: 6/29/2017.    FINDINGS:  There is generalized atrophy of the brain.  There is low  attenuation in the white matter of the cerebral hemispheres consistent  with sequelae of small vessel ischemic disease. There is no evidence  of intracranial hemorrhage, mass, acute infarct or anomaly.     The visualized portions of the sinuses and mastoids appear normal.  There is right parietal scalp soft tissue swelling with no underlying  fracture.      Impression    IMPRESSION:   1. Right parietal scalp soft tissue swelling. No underlying fracture  or intracranial trauma.  2. Atrophy of the brain.  White matter changes consistent with  sequelae of small vessel ischemic disease. This is unchanged.    NIDIA ROCHE MD   CT Cervical Spine w/o Contrast    Narrative    CT CERVICAL SPINE WITHOUT CONTRAST   7/23/2018 8:49 PM     HISTORY: Neck pain after trauma.      TECHNIQUE: Axial images of the cervical spine were obtained without  intravenous contrast. Multiplanar reformations were performed.   Radiation dose for this scan was reduced using automated exposure  control, adjustment of the mA and/or kV according to patient size, or  iterative reconstruction technique.    COMPARISON: 6/29/2017.    FINDINGS: There is no evidence of fracture. There is degeneration of  medial atlantoaxial joint  with adjacent thickening behind the  odontoid.There is multilevel degenerative disc disease especially at  C5-C6 and C6-C7. There is multilevel degenerative facet arthropathy.  There is acquired fusion anteriorly and posteriorly at C3-C4 and  bilaterally posteriorly at C2-C3. There is reversal of lordosis. There  is grade 1 subluxation of C4 on C5. There is no significant spinal  canal stenosis. Paraspinous soft tissues appear normal. Common carotid  arteries and internal carotid arteries are tortuous.      Impression    IMPRESSION:  1. No evidence of acute trauma.  2. Degenerative changes, reversal of lordosis and subluxation at C4-C5  as described above.  3. No change.     NIDIA ROCHE MD         Critical Care time:  none               No results found for this or any previous visit (from the past 24 hour(s)).  9:52 PM  Lab diagnostics and imaging reviewed with patient.  The right parietal scalp laceration is approximately 5 cm in length and was cleaned appropriately and irrigated with normal saline by the ERT.  I performed simple interrupted closure after prepping the skin with Hibiclens and ensuring adequate anesthesia with skin testing first.  I placed 6 staples.  Well-tolerated and good wound margin apposition.      Medications - No data to display    Assessments & Plan (with Medical Decision Making)   85-year-old female past medical history reviewed as above who presents to the emergency department with concerns of unwitnessed fall and head laceration as described in the HPI.  Physical exam finds a scalp laceration right parietal area, midline C-spine tenderness as described on exam.  Imaging was obtained of the patient's head and neck and reviewed as above.  Simple interrupted closure of the parietal scalp laceration performed without difficulty in the emergency department.  The patient was returned to her assisted living/memory care at [facility].  All questions were answered.      Disclaimer: This  note consists of symbols derived from keyboarding, dictation and/or voice recognition software. As a result, there may be errors in the script that have gone undetected. Please consider this when interpreting information found in this chart.      I have reviewed the nursing notes.    I have reviewed the findings, diagnosis, plan and need for follow up with the patient.          Discharge Medication List as of 7/23/2018 10:29 PM          Final diagnoses:   Fall, initial encounter   Closed head injury, initial encounter   Laceration of scalp, initial encounter       7/23/2018   [facility] EMERGENCY DEPARTMENT     Dean Calero MD  07/27/18 [id number]

## 2018-07-24 NOTE — ED NOTES
Pt daughter Shaquille was contacted at  and informed of pt imaging exams and of pt return to [facility].

## 2018-07-24 NOTE — ED NOTES
Bed: ED04  Expected date:   Expected time:   Means of arrival: Ambulance  Comments:  FALL- HEAD LAC

## 2018-07-24 NOTE — ED NOTES
Pt is resident at [facility], was found in the bathroom lying on the floor with a puddle of blood about the size of a dinner plate underneath her head. No active bleeding upon arrival to ER, unable to see laceration due to large amount of blood in her hair. No pain over spine or back, pt has pain R side of neck. Pt moves all extremities, denies pain with movement, says he has a HA. Blood glucose 105 per EMS.

## 2018-07-24 NOTE — DISCHARGE INSTRUCTIONS
Tylenol as needed for pain.  Removal of staples in 14 days.  Return to the emergency department if worse or changes.

## 2018-07-27 NOTE — TELEPHONE ENCOUNTER
[facility]   Orders were signed, faxed and sent to dago Woodard on 7/27/2018 at 7:19 AM

## 2018-07-27 NOTE — IP AVS SNAPSHOT
` `     [facility] SURGICAL: [phone]                 INTERAGENCY TRANSFER FORM - NOTES (H&P, Discharge Summary, Consults, Procedures, Therapies)   2018                    Hospital Admission Date: 2018  RHIANNA GAMBOA   : 1933  Sex: Female        Patient PCP Information     Provider PCP Type    Omar Olivo MD General         History & Physicals      H&P by Ronaldo Noriega MD at 2018  9:15 AM     Author:  Ronaldo Noriega MD Service:  Hospitalist Author Type:  Physician    Filed:  2018  9:15 AM Date of Service:  2018  9:15 AM Creation Time:  2018  9:07 AM    Status:  Signed :  Ronaldo Noriega MD (Physician)         [facility] History and Physical    Rhianna Gamboa MRN# [id number]   Age: 85 year old YOB: 1933     Date of Admission:  2018    Home clinic: [facility]  Primary care provider: Omar Olivo          Chief Complaint:   AMS    History is obtained from the electronic health record and emergency department physician          History of Present Illness:   Pt severely demented and unable to provide hx. Per ED notes  Rhianna Gamboa is a 85 year old female who has past medical history significant for Alzheimer's dementia, malignant melanoma, anemia, hypothyroidism, and significant dementia, presenting to the emergency department with concerns regarding altered mental status.  Patient was here earlier in the week, approximately 4-5 days ago after the patient had a fall, suffering laceration to the back of the head.  Staples were placed, and patient had been discharged back to [facility].  Patient was brought back to the emergency department today because of increased amounts of lethargic behavior, with increased frequent urination.  Patient with notable change in mentation level over the recent past.  No reports of fever.  Patient is unable to provide any additional history information.   History was obtained from nurse, who had discussion with EMS.           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 She was accompanied by daughter. Honoring Choices information provided and resources reviewed. She currently wishes to give additional consideration to ACP  She currently has the following questions or concerns about Advance Care Planning: none known.  Confirmed/documented legally designated decision maker(s).  Added by Purvi Albright RN, Advance Care Planning Liaison.  Advance Care Planning 4/29/2015 Patient states she does not have an advanced directive. Copy of [employer]oring Choices Advanced directive given to patient for review.  Advance Care Planning 1/15/2013: Patient states has Advance Directive and will bring in a copy to clinic.        Allergic rhinitis 09/27/2012     Priority: Medium     CTS (carpal tunnel syndrome) 03/14/2012     Priority: Medium     HYPERLIPIDEMIA LDL GOAL <130 10/31/2010     Priority: Medium     Anemia 08/14/2008     Priority: Medium     Osteoporosis 03/06/2007     Priority: Medium     Problem list name updated by automated process. Provider to review       Hypothyroidism 03/07/2006     Priority: Medium     Problem list name updated by automated process. Provider to review       Cough 03/07/2006     Priority: Medium     chronic and dry for years                 Past Surgical History:      Past Surgical History:   Procedure Laterality Date     AMPUTATE FINGER(S) Right 1/20/2016     Procedure: AMPUTATE FINGER(S);  Surgeon: Brandon Rollins MD;  Location: [facility] OR     BIOPSY NODE SENTINEL N/A 1/20/2016    Procedure: BIOPSY NODE SENTINEL;  Surgeon: Brandon Rollins MD;  Location:  OR     EYE SURGERY  7/2009    right cataract     EYE SURGERY  2010    both eyes     RELEASE CARPAL TUNNEL Right 5/12/2015    Procedure: RELEASE CARPAL TUNNEL;  Surgeon: Omar Balderrama MD;  Location: [facility] OR             Social History:     Social History     Social History     Marital status:      Spouse name: N/A     Number of children: N/A     Years of education: N/A     Occupational History     Not on file.     Social History Main Topics     Smoking status: Never Smoker     Smokeless tobacco: Never Used     Alcohol use No     Drug use: No     Sexual activity: Not Currently     Other Topics Concern     Parent/Sibling W/ Cabg, Mi Or Angioplasty Before 65f 55m? Yes     son MI in his 50s     Social History Narrative             Family History:     Family History   Problem Relation Age of Onset     C.A.D. Mother      C.A.D. Father      Cancer Maternal Grandmother      bowel cancer     C.A.D. Maternal Grandfather      Diabetes Sister      Cerebrovascular Disease Sister      HEART DISEASE Sister      HEART DISEASE Sister      HEART DISEASE Son      Breast Cancer Daughter              Allergies:     Allergies   Allergen Reactions     Codeine Other (See Comments)     Headache       Detrol [Tolterodine Tartrate] Other (See Comments)     Syncopal episode     Tylenol Itching     Other [Seasonal Allergies] Rash     Linament cream, for aches and pains and broke out where she had put it on her             Medications:     Prior to Admission medications    Medication Sig Last Dose Taking? Auth Provider   ASPIRIN 81 MG OR TABS 1 TABLET BY MOUTH DAILY 7/27/2018 at 0900 Yes Omar Olivo MD   Calcium Carb-Cholecalciferol (CALCIUM + D3) 600-200 MG-UNIT TABS Take 1 tablet by mouth 2 times daily  Patient taking differently:  Take 2 tablets by mouth daily  7/27/2018 at 0900 Yes Omar Olivo MD   cetirizine (ZYRTEC) 10 MG tablet Take 10 mg by mouth daily 7/27/2018 at 0900 Yes Reported, Patient   cyanocobalamin (VITAMIN  B-12) 1000 MCG tablet Take 1 tablet (1,000 mcg) by mouth daily 7/27/2018 at 0900 Yes Omar Olivo MD   levothyroxine (SYNTHROID) 75 MCG tablet Take 1 tablet (75 mcg) by mouth daily 7/27/2018 at 0730 Yes Omar Olivo MD   Multiple Vitamin (TAB-A-MILAD) TABS TAKE 1 TABLET BY MOUTH ONCE DAILY 7/27/2018 at 0900 Yes Omar Olivo MD   pyridoxine (VITAMIN B-6) 100 MG tablet Take 1 tablet (100 mg) by mouth daily 7/27/2018 at 0900 Yes Omar Olivo MD   RANITIDINE HCL PO Take 75 mg by mouth daily 7/27/2018 at 0900 Yes Reported, Patient   simvastatin (ZOCOR) 10 MG tablet Take 1 tablet (10 mg) by mouth At Bedtime 7/27/2018 at 1900 Yes Omar Olivo MD   amoxicillin-clavulanate (AUGMENTIN) 875-125 MG per tablet Take 1 tablet by mouth 2 times daily for 7 days   Tammy Fernandes, JENNY CNP   IBUPROFEN PO Take 800 mg by mouth every 8 hours as needed for moderate pain Unknown at prn  Reported, Patient   Naproxen Sodium (ALEVE) 220 MG capsule Take 220 mg by mouth 2 times daily (with meals). PRN Unknown at prn  Reported, Patient            Review of Systems:   The Review of Systems is negative in ALL other than noted in the HPI          Physical Exam:   Blood pressure 153/72, pulse 83, temperature 98  F (36.7  C), temperature source Oral, resp. rate 18, weight 79.3 kg (174 lb 13.2 oz), SpO2 91 %, not currently breastfeeding.  GENERAL APPEARANCE: healthy, alert and no distress  EYES: conjunctiva clear, eyes grossly normal  HENT: external ears and nose normal   NECK: supple, no masses or adenopathy  RESP: lungs clear to auscultation - no rales, rhonchi or wheezes  CV: regular rate and rhythm, normal S1 S2, no S3 or S4 and no murmur, click or rub   ABDOMEN: soft, nontender, no HSM or masses and bowel  sounds normal  MS: no clubbing, cyanosis; no edema  SKIN: clear without significant rashes or lesions  NEURO: Normal strength and tone, sensory exam grossly normal, mentation intact and speech normal         Data:     Lab Results   Component Value Date    WBC 14.5 (H) 07/28/2018    HGB 12.8 07/28/2018    HCT 39.9 07/28/2018     07/28/2018     07/28/2018     Lab Results   Component Value Date     07/28/2018    CO2 27 07/28/2018     Lab Results   Component Value Date    BUN 14 07/28/2018     No components found for: SEDRATE  No components found for: DDIMER  No results found for: BNP  Lab Results   Component Value Date    TSH 3.69 06/27/2018     No results found for: TROPONIN  UA RESULTS:  Recent Labs   Lab Test  07/27/18   2315  04/24/18   1110   COLOR  Yellow  Yellow   APPEARANCE  Clear  Clear   URINEGLC  Negative  Negative   URINEBILI  Negative  Negative   URINEKETONE  Negative  Negative   SG  1.015  1.020   UBLD  Small*  Negative   URINEPH  6.0  5.5   PROTEIN  Negative  Negative   UROBILINOGEN   --   0.2   NITRITE  Negative  Positive*   LEUKEST  Moderate*  Trace*   RBCU  5*  O - 2   WBCU  8*  10-25*     Liver Function Studies -   Recent Labs   Lab Test  07/27/18   2304   PROTTOTAL  6.9   ALBUMIN  2.9*   BILITOTAL  0.8   ALKPHOS  72   AST  24   ALT  18       RADIOLOGY:    CT HEAD/ CXR-negative     A/P  ACUTE ENCEPHALOPATHY ON BASELINE DEMENTIA  Likely due to possible UTI. Has leucocytosis, positive UA-cx-p. CT head/ cxe negative.  -continue rocephin. Monitor cx.     HYPO T4  Continue meds    ALZHEIMERS DEMENTIA  Continue vit b6/b12    DISPO  Fall just last week. In [place]. Will have PT/OT/SW eval.    DVT PROF-ambulate     Ronaldo Noriega MD  [phone][SK1.1]          Revision History        User Key Date/Time User Provider Type Action    > SK1.1 7/28/2018  9:15 AM Ronaldo Noriega MD Physician Sign                     Discharge Summaries      Discharge Summaries by Bella Hunter DO at 7/30/2018  10:42 AM     Author:  Bella Hunter DO Service:  Internal Medicine Author Type:  Physician    Filed:  7/30/2018 11:34 AM Date of Service:  7/30/2018 10:42 AM Creation Time:  7/30/2018 10:42 AM    Status:  Signed :  Bella Hunter DO (Physician)         [facility] Discharge Summary    Rhianna Gamboa MRN# [id number]   Age: 85 year old YOB: 1933     Date of Admission:  7/27/2018  Date of Discharge::[RH1.1]  7/30/2018[RH1.2]  Admitting Physician:  Grayson Cisneros MD  Discharge Physician:[RH1.1]  Bella Hunter DO[RH1.3]  Primary Physician: Omar Olivo       [place] [facility]:[RH1.1] [facility][RH1.4]          Admission Diagnoses:   Acute cystitis without hematuria [N30.00]  Altered mental status, unspecified altered mental status type [R41.82]  Alzheimer's dementia without behavioral disturbance, unspecified timing of dementia onset [G30.9, F02.80]          Discharge Diagnosis:   Principle diagnosis:[RH1.1] acute cystitis w/out hematuria[RH1.4]  Secondary diagnoses:  Altered mental status, unspecified altered mental status type [R41.82]  Alzheimer's dementia without behavioral disturbance, unspecified timing of dementia onset [G30.9, F02.80]          Procedures:[RH1.1]     None[RH1.4]            Allergies:[RH1.1]      Allergies   Allergen Reactions     Codeine Other (See Comments)     Headache       Detrol [Tolterodine Tartrate] Other (See Comments)     Syncopal episode     Tylenol Itching     Other [Seasonal Allergies] Rash     Linament cream, for aches and pains and broke out where she had put it on her[RH1.3]              Discharge Medications:[RH1.1]       Current Discharge Medication List      START taking these medications    Details   cephALEXin (KEFLEX) 500 MG capsule Take 1 capsule (500 mg) by mouth 2 times daily for 3 days  Qty: 6 capsule, Refills: 0    Associated Diagnoses: Acute cystitis without hematuria         CONTINUE these  medications which have NOT CHANGED    Details   ASPIRIN 81 MG OR TABS 1 TABLET BY MOUTH DAILY  Qty: 100, Refills: 3    Associated Diagnoses: Other and unspecified hyperlipidemia      Calcium Carb-Cholecalciferol (CALCIUM + D3) 600-200 MG-UNIT TABS Take 1 tablet by mouth 2 times daily  Qty: 62 tablet, Refills: 11    Associated Diagnoses: Osteoporosis, unspecified osteoporosis type, unspecified pathological fracture presence      cetirizine (ZYRTEC) 10 MG tablet Take 10 mg by mouth daily      cyanocobalamin (VITAMIN  B-12) 1000 MCG tablet Take 1 tablet (1,000 mcg) by mouth daily  Qty: 31 tablet, Refills: 11    Associated Diagnoses: Alzheimer's dementia without behavioral disturbance, unspecified timing of dementia onset      levothyroxine (SYNTHROID) 75 MCG tablet Take 1 tablet (75 mcg) by mouth daily  Qty: 31 tablet, Refills: 11    Associated Diagnoses: Hypothyroidism, unspecified type      Multiple Vitamin (TAB-A-MILAD) TABS TAKE 1 TABLET BY MOUTH ONCE DAILY  Qty: 31 tablet, Refills: 11    Comments: Please authorize qty 31 with prn refills for assisting living patient. thanks  Associated Diagnoses: Routine general medical examination at a health care facility      pyridoxine (VITAMIN B-6) 100 MG tablet Take 1 tablet (100 mg) by mouth daily  Qty: 31 tablet, Refills: 11    Associated Diagnoses: Alzheimer's dementia without behavioral disturbance, unspecified timing of dementia onset      RANITIDINE HCL PO Take 75 mg by mouth daily      simvastatin (ZOCOR) 10 MG tablet Take 1 tablet (10 mg) by mouth At Bedtime  Qty: 31 tablet, Refills: 11    Associated Diagnoses: Hyperlipidemia LDL goal <130      IBUPROFEN PO Take 800 mg by mouth every 8 hours as needed for moderate pain      Naproxen Sodium (ALEVE) 220 MG capsule Take 220 mg by mouth 2 times daily (with meals). PRN         STOP taking these medications       amoxicillin-clavulanate (AUGMENTIN) 875-125 MG per tablet Comments:   Reason for Stopping:[RH1.3]               "       Consultations:[RH1.1]   PT/OT/social work[RH1.4]          Brief History of Presenting Illness:[RH1.1]   \"Pt severely demented and unable to provide hx. Per ED notes  Rhianna Gamboa is a 85 year old female who has past medical history significant for Alzheimer's dementia, malignant melanoma, anemia, hypothyroidism, and significant dementia, presenting to the emergency department with concerns regarding altered mental status.  Patient was here earlier in the week, approximately 4-5 days ago after the patient had a fall, suffering laceration to the back of the head.  Staples were placed, and patient had been discharged back to [facility] in [place].  Patient was brought back to the emergency department today because of increased amounts of lethargic behavior, with increased frequent urination.  Patient with notable change in mentation level over the recent past.  No reports of fever.  Patient is unable to provide any additional history information.  History was obtained from nurse, who had discussion with EMS.\"[RH1.4]          Hospital Course:[RH1.1]   Acute encephalopathy on chronic baseline dementia: Likely due to probable urinary tract infection.  Was admitted with leukocytosis, mildly positive UA, although culture was negative.  Workup for other causes was unrevealing.  Improved with IV ceftriaxone.  White blood cell count trending down and mental status close to baseline  --Discharge with Keflex 500 twice daily to complete a total of 7 days of antibiotics    Hypothyroidism: Chronic and stable, discharged on prior to admission levothyroxine    Alzheimer's dementia: Continue prior to admission vitamin B6 and B12    Left hip pain: Had a fall at her assisted living last week.  Needing several doses of Tylenol this admission for complaints of hip and back pain.  X-ray done prior to admission does not show acute fracture.  Okay to resume prior to admission NSAIDs and Tylenol as needed for " pain[RH1.4]    # Discharge Pain Plan:[RH1.1]   - During her hospitalization, Rhianna experienced pain due to left hip pain.  She is unable to participate in a plan for discharge pain management; however, the plan was discussed in a collaborative fashion with her OSMAR .   - Pharmacologic adjuvants:  NSAIDs and Acetaminophen[RH1.4]               Discharge Exam:[RH1.1]   Blood pressure 161/80, pulse 77, temperature 97.5  F (36.4  C), temperature source Axillary, resp. rate 18, weight 79.3 kg (174 lb 13.2 oz), SpO2 94 %, not currently breastfeeding.[RH1.3]    Gen: alert, in no distress,[RH1.1] elderly, frail[RH1.4]  CV: RRR, S1 and S2 normal, no murmurs. Radial and pedal pulses symmetric and normal  Respiratory: Lungs clear bilaterally  Abd: Soft, non-tender.  Normal bowel sounds.  No hepatosplenomegaly   MSK:  extremities normal- no gross deformities noted, normal muscle tone[RH1.1] no pain with palpation along the spine or either hip.. Normal range of motion of bilateral legs[RH1.4]  Skin: no suspicious lesions or rashes  Lymph: No peripheral edema           Pending/Follow-Up Tests at Discharge:[RH1.1]   None[RH1.4]          Discharge Instructions and Follow-Up:   Discharge diet: Regular   Discharge activity: Activity as tolerated   Discharge follow-up: Follow with primary care provider in[RH1.1] 7[RH1.4] days           Discharge Disposition:[RH1.1]   Discharged to assisted living[RH1.4]      Attestation:  Amount of time performed on this discharge :[RH1.1] 20[RH1.4] minutes.    Bella Hunter DO[RH1.1]          Revision History        User Key Date/Time User Provider Type Action    > RH1.3 7/30/2018 11:34 AM Bella Hunter DO Physician Sign     RH1.2 7/30/2018 10:55 AM Bella Hunter DO Physician      RH1.4 7/30/2018 10:45 AM Bella Hunter DO Physician      RH1.1 7/30/2018 10:42 AM Bella Hunter,  Physician                      Consult Notes      Consults by Jocelin Wood, LICSW  at 7/28/2018 11:01 AM     Author:  Jocelin Wood LICSW Service:  (none) Author Type:      Filed:  7/28/2018 11:01 AM Date of Service:  7/28/2018 11:01 AM Creation Time:  7/28/2018 10:59 AM    Status:  Signed :  Jocelin Wood LICSW ()     Consult Orders:    1. Care Transition RN/SW IP Consult [[id number]] ordered by Ronaldo Noriega MD at 07/28/18 0923                CARE TRANSITION SOCIAL WORK INITIAL ASSESSMENT:  Reason For Consult: discharge planning   Met with: Patient and Family.    DATA  Active Problems:    Altered mental state       Primary Care Clinic Name:  ([facility])  Primary Care MD Name:  (Geovani)  Contact information and PCP information verified: Yes      ASSESSMENT  Cognitive Status: awake, alert and disoriented.       Resources List: Assisted Living     Lives With: facility resident  Living Arrangements: assisted living     Description of Support System: Supportive, Involved   Who is your support system?: Facility resident(s)/Staff   Support Assessment: Adequate family and caregiver support, Adequate social supports   Insurance Concerns: No Insurance issues identified        This writer met with pt and with pts Arelis rowan introduced self and role. Discussed discharge planning and medicare guidelines in regards to home care and SNF benefits. Pt lives at [facility] unit (phone: [phone] Fax: [phone]). Goal is for pt to return back to detention when stable as she receives total care there. They help her with transferring, toileting, feeding, dressing, medications, laundry, meals etc. Dtr is requesting that transport be arranged on dc.       PLAN    detention    Discharge Planner   Discharge Plans in progress: detention  Barriers to discharge plan: medical stability  Follow up plan: CTS to follow       Entered by: Jocelin Wood 07/28/2018 10:59 AM             BRIGIDA Andino, [facility] [phone][AK1.1]       Revision History        User Key  Date/Time User Provider Type Action    > AK1.1 7/28/2018 11:01 AM Jocelin Wood, [facility]  Sign                     Progress Notes - Physician (Notes from 07/27/18 through 07/30/18)      Progress Notes by Stacie Fontana PT at 7/29/2018  1:29 PM     Author:  Stacie Fontana PT Service:  (none) Author Type:  Physical Therapist    Filed:  7/29/2018  1:32 PM Date of Service:  7/29/2018  1:29 PM Creation Time:  7/29/2018  1:29 PM    Status:  Signed :  Stacie Fontana PT (Physical Therapist)         Discharge Planner PT[KJ1.1]   Patient plan for discharge: Memory Care  Current status: Transfers sit <> stand with CGA.[KJ1.2]  Pt amb 25' with RW with mod A of 2 for steering and to keep the RW close as pt tends to push it 3 feet in front of her.[KJ1.1]  Barriers to return to prior living situation: impaired gait  Recommendations for discharge: Memory Care with PT for strengthening and gait training  Rationale for recommendations: Pt would benefit from continued PT for strengthening and gait training for increased safety and endurance.       Entered by:[KJ1.2] Stacie Fontana 07/29/2018 1:30 PM[KJ1.1]     Physical Therapy Evaluation[KJ1.2]       07/29/18 1320   Living Environment   Lives With facility resident   Living Arrangements assisted living   Home Accessibility no concerns   Living Environment Comment Pt lives at [facility]   Functional Level Prior   Prior Functional Level Comment PLOF unclear as Pt is not a reliable historian. Per chart, has assist with all ADLs/IADLs at baseline. Ambulates with walker.   General Information   Onset of Illness/Injury or Date of Surgery - Date 07/27/18   Referring Physician Dr Noriega   Patient/Family Goals Statement unable to verbalize a goal   Pertinent History of Current Problem (include personal factors and/or comorbidities that impact the POC) Per H&P:  Rhianna Gamboa is a 85 year old female who has past medical history significant for Alzheimer's dementia,  malignant melanoma, anemia, hypothyroidism, and significant dementia, presenting to the emergency department with concerns regarding altered mental status.  Patient was here earlier in the week, approximately 4-5 days ago after the patient had a fall, suffering laceration to the back of the head.  Staples were placed, and patient had been discharged back to [facility].  Patient was brought back to the emergency department today because of increased amounts of lethargic behavior, with increased frequent urination.  Patient with notable change in mentation level over the recent past.  No reports of fever.    Cognitive Status Examination   Orientation person;place   Level of Consciousness alert   Follows Commands and Answers Questions 50% of the time   Personal Safety and Judgment at risk behaviors demonstrated   Memory impaired   Posture    Posture Forward head position;Protracted shoulders;Kyphosis   Range of Motion (ROM)   ROM Comment WFL for age   Strength   Strength Comments generally 4+/5 throughout   Transfer Skills   Transfer Comments sit <> stand with CGA of 2   Gait   Gait Comments Pt amb 25' with RW with mod A of 2 for steering and to keep the RW close as pt tends to push it 3 feet in front of her.   Balance   Balance Comments good static standing balance, fair to good dynamic standing balance   General Therapy Interventions   Planned Therapy Interventions gait training;strengthening   Clinical Impression   Criteria for Skilled Therapeutic Intervention yes, treatment indicated   PT Diagnosis decreased strength and endurance   Influenced by the following impairments decreased strength and endurance   Functional limitations due to impairments mobility and gait   Clinical Presentation Stable/Uncomplicated   Clinical Presentation Rationale clinical judgement   Clinical Decision Making (Complexity) Low complexity   Therapy Frequency` daily   Predicted Duration of Therapy Intervention  "(days/wks) 3 days   Anticipated Discharge Disposition (Memory Care)   Risk & Benefits of therapy have been explained Yes   Patient, Family & other staff in agreement with plan of care Yes   [facility] TM \"6 Clicks\"   2016, Trustees of [facility], under license to [employer].  All rights reserved.   6 Clicks Short Forms Basic Mobility Inpatient Short Form   [facility] AM-PAC  \"6 Clicks\" V.2 Basic Mobility Inpatient Short Form   1. Turning from your back to your side while in a flat bed without using bedrails? 2 - A Lot   2. Moving from lying on your back to sitting on the side of a flat bed without using bedrails? 2 - A Lot   3. Moving to and from a bed to a chair (including a wheelchair)? 2 - A Lot   4. Standing up from a chair using your arms (e.g., wheelchair, or bedside chair)? 3 - A Little   5. To walk in hospital room? 2 - A Lot   6. Climbing 3-5 steps with a railing? 1 - Total   Basic Mobility Raw Score (Score out of 24.Lower scores equate to lower levels of function) 12   Total Evaluation Time   Total Evaluation Time (Minutes) 15[KJ1.1]     See Care Plan for Goals.    Stacie Fontana PT[KJ1.2]         Revision History        User Key Date/Time User Provider Type Action    > KJ1.1 7/29/2018  1:32 PM Stacie Fontana, PT Physical Therapist Sign     KJ1.2 7/29/2018  1:29 PM Stacie Fontana, PT Physical Therapist             Progress Notes by Mariza Stauffer OT at 7/29/2018  1:17 PM     Author:  Mariza Stauffer OT Service:  Acute IP Rehab Author Type:  Occupational Therapist    Filed:  7/29/2018  1:18 PM Date of Service:  7/29/2018  1:17 PM Creation Time:  7/29/2018  1:17 PM    Status:  Signed :  Mariza Stauffer OT (Occupational Therapist)            07/29/18 1300   Quick Adds   Type of Visit Initial Occupational Therapy Evaluation   Living Environment   Lives With facility resident   Living Arrangements assisted living   Home Accessibility no concerns   Living Environment Comment Pt " lives at [facility]   Functional Level Prior   Prior Functional Level Comment PLOF unclear as Pt is not a reliable historian. Per chart, has assist with all ADLs/IADLs at baseline. Ambulates with walker.   General Information   Onset of Illness/Injury or Date of Surgery - Date 07/27/18   Referring Physician Dr. Noriega   Patient/Family Goals Statement none stated   Additional Occupational Profile Info/Pertinent History of Current Problem ACUTE ENCEPHALOPATHY ON BASELINE DEMENTIA, likely d/t UTI (per chart)   Precautions/Limitations fall precautions   General Observations pleasantly confused   Cognitive Status Examination   Orientation person;place   Level of Consciousness alert   Able to Follow Commands moderate impairment   Personal Safety (Cognitive) severe impairment   Cognitive Comment advanced dementia    Pain Assessment   Patient Currently in Pain No   Range of Motion (ROM)   ROM Quick Adds No deficits were identified   Strength   Strength Comments BUE grossly 4-/5   Transfer Skill: Sit to Stand   Level of [place]: Sit/Stand contact guard   Physical Assist/Nonphysical Assist: Sit/Stand 2 persons   Transfer Skill: Sit to Stand full weight-bearing   Assistive Device for Transfer: Sit/Stand rolling walker   Transfer Skill: Toilet Transfer   Level of [place]: Toilet moderate assist (50% patients effort)   Physical Assist/Nonphysical Assist: Toilet 2 persons   Weight-Bearing Restrictions: Toilet full weight-bearing   Assistive Device rolling walker;grab bars   Toilet Transfer Skill Comments extra time, and use of B grab bars. Needing Ax2 for safety   Lower Body Dressing   Level of [place]: Dress Lower Body maximum assist (25% patients effort)   Physical Assist/Nonphysical Assist: Dress Lower Body 1 person assist   Toileting   Level of [place]: Toilet stand-by assist   Physical Assist/Nonphysical Assist: Toilet set-up required;supervision;verbal cues   Grooming   Level of [place]:  "Grooming stand-by assist   Physical Assist/Nonphysical Assist: Grooming set-up required;supervision;verbal cues   Instrumental Activities of Daily Living (IADL)   IADL Comments relies on OSMAR   Activities of Daily Living Analysis   Impairments Contributing to Impaired Activities of Daily Living balance impaired;cognition impaired;strength decreased   Clinical Impression   Criteria for Skilled Therapeutic Interventions Met yes, treatment indicated   OT Diagnosis decreased functional ind, and impaired safety with mobility   Influenced by the following impairments generalized weakness d/t UTI   Assessment of Occupational Performance 1-3 Performance Deficits   Identified Performance Deficits safety during mobility, strength for mobility/functional transfers   Clinical Decision Making (Complexity) Low complexity   Therapy Frequency 3 times/wk   Predicted Duration of Therapy Intervention (days/wks) 2-3 days   Anticipated Discharge Disposition Long Term Care Facility   Risks and Benefits of Treatment have been explained. Yes   Patient, Family & other staff in agreement with plan of care Yes   Clinical Impression Comments Pt will benefit from ongoing OT to increase strength and endurance in order to maximize safety with ADLs/mobility.    [facility] [employer] TM \"6 Clicks\"   2016, Trustees of [facility], under license to [employer].  All rights reserved.   6 Clicks Short Forms Daily Activity Inpatient Short Form   [facility] [employer]  \"6 Clicks\" Daily Activity Inpatient Short Form   1. Putting on and taking off regular lower body clothing? 2 - A Lot   2. Bathing (including washing, rinsing, drying)? 2 - A Lot   3. Toileting, which includes using toilet, bedpan or urinal? 3 - A Little   4. Putting on and taking off regular upper body clothing? 3 - A Little   5. Taking care of personal grooming such as brushing teeth? 3 - A Little   6. Eating meals? 4 - None   Daily Activity Raw Score (Score out of 24.Lower " scores equate to lower levels of function) 17   Total Evaluation Time   Total Evaluation Time (Minutes) 15     See care plan for goals[JM1.1]    Mariza Edmonds[JM1.2]PEDRO SureshR/L[JM1.1]     Revision History        User Key Date/Time User Provider Type Action    > JM1.2 7/29/2018  1:18 PM Mariza Stauffer OT Occupational Therapist Sign     JM1.1 7/29/2018  1:17 PM Mariza Stauffer OT Occupational Therapist             Progress Notes by Ronaldo Noriega MD at 7/29/2018 11:16 AM     Author:  Ronaldo Noriega MD Service:  Hospitalist Author Type:  Physician    Filed:  7/29/2018 11:25 AM Date of Service:  7/29/2018 11:16 AM Creation Time:  7/29/2018 11:16 AM    Status:  Signed :  Ronaldo Noriega MD (Physician)         [facility] Internal Medicine Progress Note     Date of Service (when I saw the patient): 07/29/2018    REASON FOR ADMISSION / INTERVAL HISTORY:  AMS. Pt has dementia. See details below.    ASSESSMENT/PLAN:   ACUTE ENCEPHALOPATHY ON BASELINE DEMENTIA  Likely due to possible UTI. Has leucocytosis, mildly  positive UA-cx-p. CT head/ cxr negative. Improved significantly today  -continue rocephin. Monitor cx. F/u  Wbc in AM     HYPO T4  Continue meds     ALZHEIMERS DEMENTIA  Continue vit b6/b12     DISPO  Fall just last week. In [place]. Will have PT/OT/SW eval.      RONALDO NORIEGA MD   Pg [phone]    DVT Prhylaxis: Low Risk/Ambulatory with no VTE prophylaxis indicated  Code Status: DNR/DNI    ROS:  As described in A/P and Exam.  Otherwise ALL are  negative.    PHYSICAL EXAM:  All vitals have been reviewed    Blood pressure 123/68, pulse 94, temperature 97.9  F (36.6  C), temperature source Oral, resp. rate 16, weight 79.3 kg (174 lb 13.2 oz), SpO2 93 %, not currently breastfeeding.    I/O this shift:  In: 480 [P.O.:480]  Out: -     GENERAL APPEARANCE: healthy, alert and no distress  EYES: conjunctiva clear, eyes grossly normal  HENT: external ears and nose normal   NECK: supple, no masses or adenopathy  RESP: lungs  clear to auscultation - no rales, rhonchi or wheezes  CV: regular rate and rhythm, normal S1 S2, no S3 or S4 and no murmur, click or rub   ABDOMEN: soft, nontender, no HSM or masses and bowel sounds normal  MS: no clubbing, cyanosis; no edema  SKIN: clear without significant rashes or lesions  NEURO: -non-focal moves all 4 extr    ROUTINE  LABS (Last four results)  CMP  Recent Labs  Lab 07/28/18 0615 07/27/18 2304 07/23/18 2055    136 136   POTASSIUM 3.6 3.6 3.8   CHLORIDE 107 101 103   CO2 27 26 29   ANIONGAP 5 9 4   * 116* 104*   BUN 14 17 16   CR 0.71 0.81 0.78   GFRESTIMATED 78 67 70   GFRESTBLACK >90 81 85   TANG 8.7 8.1* 8.6   PROTTOTAL  --  6.9  --    ALBUMIN  --  2.9*  --    BILITOTAL  --  0.8  --    ALKPHOS  --  72  --    AST  --  24  --    ALT  --  18  --      CBC  Recent Labs  Lab 07/28/18 0615 07/27/18 2304 07/23/18 2055   WBC 14.5* 12.7* 8.7   RBC 4.00 3.85 4.10   HGB 12.8 12.4 13.4   HCT 39.9 37.7 41.2    98 101*   MCH 32.0 32.2 32.7   MCHC 32.1 32.9 32.5   RDW 13.5 13.7 13.5    218 240     INRNo lab results found in last 7 days.  Arterial Blood GasNo lab results found in last 7 days.    No results found for this or any previous visit (from the past 24 hour(s)).[SK1.1]       Revision History        User Key Date/Time User Provider Type Action    > SK1.1 7/29/2018 11:25 AM Ronaldo Noriega MD Physician Sign            Progress Notes by Tabatha Petersen, RN at 7/28/2018  6:53 PM     Author:  Tabatha Petersen RN Service:  Nursing Author Type:  Registered Nurse    Filed:  7/28/2018  7:07 PM Date of Service:  7/28/2018  6:53 PM Creation Time:  7/28/2018  6:53 PM    Status:  Signed :  Tabatha Petersen, RN (Registered Nurse)         After her afternoon nap, [facility]. She was awake during meal and fed self.   Spoke with Ania MCCALL at [facility] who said she has had a steady decline since she moved into facility in May. She was  ambulatory and last couple of days they have had to use an EZ stand. Reportedly she gets very weak when she has a UTI. Writer asked her about her reports of pain left hip and back pain she had reported during turning and repositioning and Ania said that she did have reports of the same pain at [facility] and they would give her ibuprofen.  Sitting up in chair, enjoying ice cream.[HH1.1]      Revision History        User Key Date/Time User Provider Type Action    > HH1.1 7/28/2018  7:07 PM Tabatha Petersen RN Registered Nurse Sign            Progress Notes by Mariza Stauffer OT at 7/28/2018  1:08 PM     Author:  Mariza Stauffer OT Service:  Acute IP Rehab Author Type:  Occupational Therapist    Filed:  7/28/2018  1:09 PM Date of Service:  7/28/2018  1:08 PM Creation Time:  7/28/2018  1:08 PM    Status:  Signed :  Mariza Stauffer OT (Occupational Therapist)         OT order received. Per MD, hold therapies today. Will attempt to initiate OT tomorrow.     Mariza Stauffer OTR/L[JM1.1]     Revision History        User Key Date/Time User Provider Type Action    > JM1.1 7/28/2018  1:09 PM Mariza Stauffer OT Occupational Therapist Sign            Progress Notes by Cynthia Ren RN at 7/28/2018  6:28 AM     Author:  Cynthia Ren RN Service:  Skilled Nursing Author Type:  Registered Nurse    Filed:  7/28/2018  6:29 AM Date of Service:  7/28/2018  6:28 AM Creation Time:  7/28/2018  6:28 AM    Status:  Signed :  Cynthia Ren RN (Registered Nurse)         Spoke to attendant at [facility] and nurse will be available at 1000 and to call back at that time to complete admit assessment process.[SB1.1]      Revision History        User Key Date/Time User Provider Type Action    > SB1.1 7/28/2018  6:29 AM Cynthia Ren RN Registered Nurse Sign            Progress Notes by Cate Amor RN at 7/28/2018  5:47 AM     Author:  Kurilla, Cate, RN Service:  Nursing Author Type:  Registered Nurse    Filed:   7/28/2018  5:47 AM Date of Service:  7/28/2018  5:47 AM Creation Time:  7/28/2018  5:47 AM    Status:  Signed :  Cate Amor RN (Registered Nurse)         Skin affirmation note    Admitting nurse completed full skin assessment, Thomas score and Thomas interventions. This writer agrees with the initial skin assessment findings.[AK1.1]       Revision History        User Key Date/Time User Provider Type Action    > AK1.1 7/28/2018  5:47 AM Cate Amor RN Registered Nurse Sign            ED Provider Notes by Jacoby Elliott MD at 7/27/2018 10:36 PM     Author:  Jacoby Elliott MD Service:  Emergency Medicine Author Type:  Physician    Filed:  7/28/2018  5:17 AM Date of Service:  7/27/2018 10:36 PM Creation Time:  7/28/2018  5:12 AM    Status:  Signed :  Jacoby Elliott MD (Physician)           History     Chief Complaint   Patient presents with     Altered Mental Status     lethargic all day     HPI  Rhianna Gamboa is a 85 year old female who[DW1.1] has past medical history significant for Alzheimer's dementia, malignant melanoma, anemia, hypothyroidism, and significant dementia, presenting to the emergency department with concerns regarding altered mental status.  Patient was here earlier in the week, approximately 4-5 days ago after the patient had a fall, suffering laceration to the back of the head.  Staples were placed, and patient had been discharged back to [facility].  Patient was brought back to the emergency department today because of increased amounts of lethargic behavior, with increased frequent urination.  Patient with notable change in mentation level over the recent past.  No reports of fever.  Patient is unable to provide any additional history information.  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 She was accompanied by daughter. Honoring Choices information provided and resources reviewed. She currently wishes to give additional consideration to ACP  She currently has the following questions or concerns about Advance Care Planning: none known.  Confirmed/documented legally designated decision maker(s).  Added by Purvi Albright RN, Advance Care Planning Liaison.  Advance Care Planning 4/29/2015 Patient states she does not have an advanced directive. Copy of [employer]oring Choices Advanced directive given to patient for review.  Advance Care Planning 1/15/2013: Patient states has Advance Directive and will bring in a copy to clinic.        Allergic rhinitis 09/27/2012     Priority: Medium     CTS (carpal tunnel syndrome) 03/14/2012     Priority: Medium     HYPERLIPIDEMIA LDL GOAL <130 10/31/2010     Priority: Medium     Anemia 08/14/2008     Priority: Medium     Osteoporosis 03/06/2007     Priority: Medium     Problem list name updated by automated process. Provider to review       Hypothyroidism 03/07/2006     Priority: Medium     Problem list name updated by automated process. Provider to review       Cough 03/07/2006     Priority: Medium     chronic and dry for years          Past Medical History:    Past Medical History:   Diagnosis Date     Basal cell carcinoma      Malignant melanoma (H)        Past Surgical History:    Past Surgical History:   Procedure Laterality Date     AMPUTATE FINGER(S) Right 1/20/2016    Procedure: AMPUTATE FINGER(S);  Surgeon: Brandon Rollins MD;  Location:  OR      BIOPSY NODE SENTINEL N/A 1/20/2016    Procedure: BIOPSY NODE SENTINEL;  Surgeon: Brandon Rollins MD;  Location: [facility] OR     EYE SURGERY  7/2009    right cataract     EYE SURGERY  2010    both eyes     RELEASE CARPAL TUNNEL Right 5/12/2015    Procedure: RELEASE CARPAL TUNNEL;  Surgeon: Omar Balderrama MD;  Location: [facility] OR       Family History:    Family History   Problem Relation Age of Onset     C.A.D. Mother      C.A.D. Father      Cancer Maternal Grandmother      bowel cancer     C.A.D. Maternal Grandfather      Diabetes Sister      Cerebrovascular Disease Sister      HEART DISEASE Sister      HEART DISEASE Sister      HEART DISEASE Son      Breast Cancer Daughter        Social History:  Marital Status:   [5]  Social History   Substance Use Topics     Smoking status: Never Smoker     Smokeless tobacco: Never Used     Alcohol use No        Medications:      No current outpatient prescriptions on file.      Review of Systems[DW1.1]   Unable to perform ROS[DW1.2]:[DW1.1] Dementia[DW1.2]       Physical Exam   BP: 94/57  Pulse: 80  Heart Rate: 80  Temp: 97.4  F (36.3  C)  Resp: 20  Weight: 79.3 kg (174 lb 13.2 oz)  SpO2: 92 %      Physical Exam[DW1.1]  /62 (BP Location: Right arm)  Pulse 83  Temp 97.8  F (36.6  C) (Oral)  Resp 18  Wt 79.3 kg (174 lb 13.2 oz)  SpO2 92%  BMI 31.47 kg/m2[DW1.3]  General: Awake, moving extremities.  Unable to provide any history information.  Able to follow simple commands.  Head: posterior scalp staples in place.  Nose: no rhinorrhea or epistaxis  Ears: no external auditory canal discharge or bleeding.    Eyes: Sclera nonicteric. Conjunctiva noninjected. PERRL,   Mouth: no tonsillar erythema, edema, or exudate  Neck: supple, no palp LAD  Lungs: CTAB  CV: RRR, S1/S2; peripheral pulses palpable and symmetric  Abdomen: soft, nt, nd, no guarding or rebound. Positive bowel sounds  Extremities: no cyanosis or edema  Skin: no rash or diaphoresis  Neuro: Moving all  extremities.[DW1.2]      ED Course     ED Course     Procedures               Critical Care time:[DW1.1]  none[DW1.2]               Results for orders placed or performed during the hospital encounter of 07/27/18 (from the past 24 hour(s))   CBC with platelets differential   Result Value Ref Range    WBC 12.7 (H) 4.0 - 11.0 10e9/L    RBC Count 3.85 3.8 - 5.2 10e12/L    Hemoglobin 12.4 11.7 - 15.7 g/dL    Hematocrit 37.7 35.0 - 47.0 %    MCV 98 78 - 100 fl    MCH 32.2 26.5 - 33.0 pg    MCHC 32.9 31.5 - 36.5 g/dL    RDW 13.7 10.0 - 15.0 %    Platelet Count 218 150 - 450 10e9/L    Diff Method Automated Method     % Neutrophils 70.1 %    % Lymphocytes 13.7 %    % Monocytes 15.3 %    % Eosinophils 0.3 %    % Basophils 0.2 %    % Immature Granulocytes 0.4 %    Nucleated RBCs 0 0 /100    Absolute Neutrophil 8.9 (H) 1.6 - 8.3 10e9/L    Absolute Lymphocytes 1.7 0.8 - 5.3 10e9/L    Absolute Monocytes 1.9 (H) 0.0 - 1.3 10e9/L    Absolute Eosinophils 0.0 0.0 - 0.7 10e9/L    Absolute Basophils 0.0 0.0 - 0.2 10e9/L    Abs Immature Granulocytes 0.1 0 - 0.4 10e9/L    Absolute Nucleated RBC 0.0    Comprehensive metabolic panel   Result Value Ref Range    Sodium 136 133 - 144 mmol/L    Potassium 3.6 3.4 - 5.3 mmol/L    Chloride 101 94 - 109 mmol/L    Carbon Dioxide 26 20 - 32 mmol/L    Anion Gap 9 3 - 14 mmol/L    Glucose 116 (H) 70 - 99 mg/dL    Urea Nitrogen 17 7 - 30 mg/dL    Creatinine 0.81 0.52 - 1.04 mg/dL    GFR Estimate 67 >60 mL/min/1.7m2    GFR Estimate If Black 81 >60 mL/min/1.7m2    Calcium 8.1 (L) 8.5 - 10.1 mg/dL    Bilirubin Total 0.8 0.2 - 1.3 mg/dL    Albumin 2.9 (L) 3.4 - 5.0 g/dL    Protein Total 6.9 6.8 - 8.8 g/dL    Alkaline Phosphatase 72 40 - 150 U/L    ALT 18 0 - 50 U/L    AST 24 0 - 45 U/L   Lactic acid whole blood   Result Value Ref Range    Lactic Acid 0.9 0.7 - 2.0 mmol/L   UA reflex to Microscopic   Result Value Ref Range    Color Urine Yellow     Appearance Urine Clear     Glucose Urine Negative  NEG^Negative mg/dL    Bilirubin Urine Negative NEG^Negative    Ketones Urine Negative NEG^Negative mg/dL    Specific Gravity Urine 1.015 1.003 - 1.035    Blood Urine Small (A) NEG^Negative    pH Urine 6.0 5.0 - 7.0 pH    Protein Albumin Urine Negative NEG^Negative mg/dL    Urobilinogen mg/dL 0.0 0.0 - 2.0 mg/dL    Nitrite Urine Negative NEG^Negative    Leukocyte Esterase Urine Moderate (A) NEG^Negative    Source Catheterized Urine     RBC Urine 5 (H) 0 - 2 /HPF    WBC Urine 8 (H) 0 - 5 /HPF    Bacteria Urine Few (A) NEG^Negative /HPF    Squamous Epithelial /HPF Urine 1 0 - 1 /HPF    Mucous Urine Present (A) NEG^Negative /LPF   Head CT w/o contrast    Narrative    CT HEAD WITHOUT CONTRAST  7/28/2018 12:01 AM     HISTORY: Recent fall. Increased confusion and lethargy.    COMPARISON: 7/23/2018.    TECHNIQUE: Without intravenous contrast, helical sections were  acquired through the brain. Coronal reconstructions were generated.  Radiation dose for this scan was reduced using automated exposure  control, adjustment of the mA and/or kV according to the patient's  size, or iterative reconstruction technique.    FINDINGS: Moderate diffuse cerebral atrophy. Moderate periventricular  white matter low attenuation, likely relating to chronic small vessel  ischemic disease. No intra-axial mass, mass effect or midline shift.  Normal gray-white matter differentiation. No visualized acute  intra-axial hemorrhage. The cerebral ventricles are normal in caliber.  The basal cisterns are patent. No extra-axial fluid collection. The  visualized portions of the paranasal sinuses and mastoid air cells are  unremarkable. A few skin staples are present in the right posterior  parietal scalp.      Impression    IMPRESSION: No evidence of acute intracranial abnormality.    ANNY REES MD   XR Chest Port 1 View    Narrative    CHEST SINGLE VIEW PORTABLE  7/28/2018 12:27 AM     HISTORY: Cough.    COMPARISON: 7/23/2018.    FINDINGS:  Hypoinflated lungs. The lungs are clear. The size of the  cardiac silhouette is within normal limits. Atherosclerotic  calcification in the thoracic aorta.      Impression    IMPRESSION: No convincing evidence of active cardiopulmonary disease.    ANNY REES MD       Medications   0.9% sodium chloride BOLUS (1,000 mLs Intravenous New Bag 7/27/18 5219)     Followed by   sodium chloride 0.9% infusion (0 mLs Intravenous ED Infusing on Admission/transfer 7/28/18 0110)   naloxone (NARCAN) injection 0.1-0.4 mg (not administered)   ibuprofen (ADVIL/MOTRIN) tablet 600 mg (not administered)   ondansetron (ZOFRAN-ODT) ODT tab 4 mg (not administered)     Or   ondansetron (ZOFRAN) injection 4 mg (not administered)   prochlorperazine (COMPAZINE) injection 5 mg (not administered)     Or   prochlorperazine (COMPAZINE) tablet 5 mg (not administered)     Or   prochlorperazine (COMPAZINE) Suppository 12.5 mg (not administered)   cefTRIAXone in d5w (ROCEPHIN) intermittent infusion 1 g (not administered)   cefTRIAXone in d5w (ROCEPHIN) intermittent infusion 1 g (0 g Intravenous Stopped 7/28/18 0334)       Assessments & Plan (with Medical Decision Making)[DW1.1]  85 year old female[DW1.4], presenting to the emergency department with increased amounts of lethargy, in addition to decreased activity level at her care facility during the day today.  She does have recent visit to the emergency department 4 days ago after the patient had a fall.  Therefore, concern is for potential of delayed bleed, or other acute intracranial cause of patient's mentation change.  Also, concern remains for infectious, or metabolic cause of patient's mentation changes.  Patient arrives afebrile, laboratory workup is performed and does have slight elevation of white blood cell count at 12.7.  CMP is unremarkable.  Chest x-ray performed that shows no evidence of pneumonia.  Catheterized urine specimen does have 8 white blood cells, with leukocyte esterase  and a few bacteria.  Therefore, potential for UTI as a cause of patient's mentation level changes.  CT scan of the head showed no acute findings.  Therefore, at this point, working diagnosis is bladder infection as the cause of patient's mentation changes.  Rocephin was administered.  Urine culture pending.  Discussed with hospitalist, and patient admitted for further management.[DW1.2]     I have reviewed the nursing notes.    I have reviewed the findings, diagnosis, plan and need for follow up with the patient.       Current Discharge Medication List          Final diagnoses:   Acute cystitis without hematuria   Alzheimer's dementia without behavioral disturbance, unspecified timing of dementia onset   Altered mental status, unspecified altered mental status type       7/27/2018   [facility][DW1.1]     Jacoby Elliott MD  07/28/18 0517  [DW1.5]     Revision History        User Key Date/Time User Provider Type Action    > DW1.5 7/28/2018  5:17 AM Jacoby Elliott MD Physician Sign     DW1.4 7/28/2018  5:16 AM Jacoby Elliott MD Physician      DW1.3 7/28/2018  5:15 AM Jacoby Elliott MD Physician      DW1.2 7/28/2018  5:13 AM Jacoby Elliott MD Physician      DW1.1 7/28/2018  5:12 AM Jacoby Elliott MD Physician             ED Notes by Lizbeth Cuadra RN at 7/28/2018 12:10 AM     Author:  Lizbeth Cuadra RN Service:  (none) Author Type:  Registered Nurse    Filed:  7/28/2018  1:54 AM Date of Service:  7/28/2018 12:10 AM Creation Time:  7/28/2018  1:54 AM    Status:  Signed :  Lizbeth Cuadra RN (Registered Nurse)         Pt cont sleeping.  Breathing ENL and VSS[LP1.1]     Revision History        User Key Date/Time User Provider Type Action    > LP1.1 7/28/2018  1:54 AM Lizbeth Cuadra RN Registered Nurse Sign            ED Notes by Lizbeth Cuadra RN at 7/27/2018 11:30 PM     Author:  Lizbeth Cuadra RN Service:  (none) Author Type:  Registered  Nurse    Filed:  7/28/2018  1:54 AM Date of Service:  7/27/2018 11:30 PM Creation Time:  7/28/2018  1:54 AM    Status:  Signed :  Lizbeth Cuadra RN (Registered Nurse)         Report recd' from Anna HEBERT RN.  Pt is sleeping, breathing ENL.  VSS[LP1.1]     Revision History        User Key Date/Time User Provider Type Action    > LP1.1 7/28/2018  1:54 AM Lizbeth Cuadra RN Registered Nurse Sign            ED Notes by Sarah Jones RN at 7/27/2018 10:55 PM     Author:  Sarah Jones RN Service:  (none) Author Type:  Registered Nurse    Filed:  7/27/2018 11:26 PM Date of Service:  7/27/2018 10:55 PM Creation Time:  7/27/2018 10:55 PM    Status:  Addendum :  Sarah Jones RN (Registered Nurse)         Pt is resident of [facility] in [place].[SF1.1] Pt arrived to ER via EMS. Pt had a fall on Monday and was seen here in ER for laceration on the back of her head. EMS was called as pt has been lethargic all day with frequent urination. Pt has strong hand grasps bilat, is not following directions as well tonight as she was on Monday when this RN took care of her. Pt denies HA but seems more comfortable with the lights off, she closes her eyes tightly when the overhead lights are on. Pt straight cathed for dark yellow urine, urine was not foul smelling. Blood glucose per EMS was 129.[SF1.2]     Revision History        User Key Date/Time User Provider Type Action    > SF1.2 7/27/2018 11:26 PM Sarah Jones RN Registered Nurse Addend     SF1.1 7/27/2018 10:56 PM Sarah Jones RN Registered Nurse Sign            ED Notes by Jesi Solano RN at 7/27/2018 10:36 PM     Author:  Jesi Solano RN Service:  (none) Author Type:  Registered Nurse    Filed:  7/27/2018 10:36 PM Date of Service:  7/27/2018 10:36 PM Creation Time:  7/27/2018 10:36 PM    Status:  Signed :  Jesi Solano RN (Registered Nurse)         Bed: ED04  Expected date:   Expected time:   Means of arrival:  Ambulance  Comments:  lethargy     Revision History        User Key Date/Time User Provider Type Action    > DR1.1 7/27/2018 10:36 PM Jesi Solano RN Registered Nurse Sign                  Procedure Notes     No notes of this type exist for this encounter.         Progress Notes - Therapies (Notes from 07/27/18 through 07/30/18)      Progress Notes by Stacie Fontana PT at 7/29/2018  1:29 PM     Author:  Stacie Fontana PT Service:  (none) Author Type:  Physical Therapist    Filed:  7/29/2018  1:32 PM Date of Service:  7/29/2018  1:29 PM Creation Time:  7/29/2018  1:29 PM    Status:  Signed :  Stacie Fontana PT (Physical Therapist)         Discharge Planner PT[KJ1.1]   Patient plan for discharge: Memory Care  Current status: Transfers sit <> stand with CGA.[KJ1.2]  Pt amb 25' with RW with mod A of 2 for steering and to keep the RW close as pt tends to push it 3 feet in front of her.[KJ1.1]  Barriers to return to prior living situation: impaired gait  Recommendations for discharge: Memory Care with PT for strengthening and gait training  Rationale for recommendations: Pt would benefit from continued PT for strengthening and gait training for increased safety and endurance.       Entered by:[KJ1.2] Stacie Fontana 07/29/2018 1:30 PM[KJ1.1]     Physical Therapy Evaluation[KJ1.2]       07/29/18 1320   Living Environment   Lives With facility resident   Living Arrangements assisted living   Home Accessibility no concerns   Living Environment Comment Pt lives at [facility]   Functional Level Prior   Prior Functional Level Comment PLOF unclear as Pt is not a reliable historian. Per chart, has assist with all ADLs/IADLs at baseline. Ambulates with walker.   General Information   Onset of Illness/Injury or Date of Surgery - Date 07/27/18   Referring Physician Dr Noriega   Patient/Family Goals Statement unable to verbalize a goal   Pertinent History of Current Problem (include personal factors and/or comorbidities that  impact the POC) Per H&P:  Rhianna Gamboa is a 85 year old female who has past medical history significant for Alzheimer's dementia, malignant melanoma, anemia, hypothyroidism, and significant dementia, presenting to the emergency department with concerns regarding altered mental status.  Patient was here earlier in the week, approximately 4-5 days ago after the patient had a fall, suffering laceration to the back of the head.  Staples were placed, and patient had been discharged back to [facility] in [place].  Patient was brought back to the emergency department today because of increased amounts of lethargic behavior, with increased frequent urination.  Patient with notable change in mentation level over the recent past.  No reports of fever.    Cognitive Status Examination   Orientation person;place   Level of Consciousness alert   Follows Commands and Answers Questions 50% of the time   Personal Safety and Judgment at risk behaviors demonstrated   Memory impaired   Posture    Posture Forward head position;Protracted shoulders;Kyphosis   Range of Motion (ROM)   ROM Comment WFL for age   Strength   Strength Comments generally 4+/5 throughout   Transfer Skills   Transfer Comments sit <> stand with CGA of 2   Gait   Gait Comments Pt amb 25' with RW with mod A of 2 for steering and to keep the RW close as pt tends to push it 3 feet in front of her.   Balance   Balance Comments good static standing balance, fair to good dynamic standing balance   General Therapy Interventions   Planned Therapy Interventions gait training;strengthening   Clinical Impression   Criteria for Skilled Therapeutic Intervention yes, treatment indicated   PT Diagnosis decreased strength and endurance   Influenced by the following impairments decreased strength and endurance   Functional limitations due to impairments mobility and gait   Clinical Presentation Stable/Uncomplicated   Clinical Presentation Rationale clinical judgement  "  Clinical Decision Making (Complexity) Low complexity   Therapy Frequency` daily   Predicted Duration of Therapy Intervention (days/wks) 3 days   Anticipated Discharge Disposition (Memory Care)   Risk & Benefits of therapy have been explained Yes   Patient, Family & other staff in agreement with plan of care Yes   [facility] TM \"6 Clicks\"   2016, Trustees of [facility], under license to [employer].  All rights reserved.   6 Clicks Short Forms Basic Mobility Inpatient Short Form   [facility] AM-PAC  \"6 Clicks\" V.2 Basic Mobility Inpatient Short Form   1. Turning from your back to your side while in a flat bed without using bedrails? 2 - A Lot   2. Moving from lying on your back to sitting on the side of a flat bed without using bedrails? 2 - A Lot   3. Moving to and from a bed to a chair (including a wheelchair)? 2 - A Lot   4. Standing up from a chair using your arms (e.g., wheelchair, or bedside chair)? 3 - A Little   5. To walk in hospital room? 2 - A Lot   6. Climbing 3-5 steps with a railing? 1 - Total   Basic Mobility Raw Score (Score out of 24.Lower scores equate to lower levels of function) 12   Total Evaluation Time   Total Evaluation Time (Minutes) 15[KJ1.1]     See Care Plan for Goals.    Stacie Fontana PT[KJ1.2]         Revision History        User Key Date/Time User Provider Type Action    > KJ1.1 7/29/2018  1:32 PM Stacie Fontana, PT Physical Therapist Sign     KJ1.2 7/29/2018  1:29 PM Stacie Fontana, PT Physical Therapist             Progress Notes by Mariza Stauffer OT at 7/29/2018  1:17 PM     Author:  Mariza Stauffer OT Service:  Acute IP Rehab Author Type:  Occupational Therapist    Filed:  7/29/2018  1:18 PM Date of Service:  7/29/2018  1:17 PM Creation Time:  7/29/2018  1:17 PM    Status:  Signed :  Mariza Stauffer OT (Occupational Therapist)            07/29/18 1300   Quick Adds   Type of Visit Initial Occupational Therapy Evaluation   Living Environment   Lives " With facility resident   Living Arrangements assisted living   Home Accessibility no concerns   Living Environment Comment Pt lives at [facility]   Functional Level Prior   Prior Functional Level Comment PLOF unclear as Pt is not a reliable historian. Per chart, has assist with all ADLs/IADLs at baseline. Ambulates with walker.   General Information   Onset of Illness/Injury or Date of Surgery - Date 07/27/18   Referring Physician Dr. Noriega   Patient/Family Goals Statement none stated   Additional Occupational Profile Info/Pertinent History of Current Problem ACUTE ENCEPHALOPATHY ON BASELINE DEMENTIA, likely d/t UTI (per chart)   Precautions/Limitations fall precautions   General Observations pleasantly confused   Cognitive Status Examination   Orientation person;place   Level of Consciousness alert   Able to Follow Commands moderate impairment   Personal Safety (Cognitive) severe impairment   Cognitive Comment advanced dementia    Pain Assessment   Patient Currently in Pain No   Range of Motion (ROM)   ROM Quick Adds No deficits were identified   Strength   Strength Comments BUE grossly 4-/5   Transfer Skill: Sit to Stand   Level of Ward: Sit/Stand contact guard   Physical Assist/Nonphysical Assist: Sit/Stand 2 persons   Transfer Skill: Sit to Stand full weight-bearing   Assistive Device for Transfer: Sit/Stand rolling walker   Transfer Skill: Toilet Transfer   Level of Ward: Toilet moderate assist (50% patients effort)   Physical Assist/Nonphysical Assist: Toilet 2 persons   Weight-Bearing Restrictions: Toilet full weight-bearing   Assistive Device rolling walker;grab bars   Toilet Transfer Skill Comments extra time, and use of B grab bars. Needing Ax2 for safety   Lower Body Dressing   Level of Ward: Dress Lower Body maximum assist (25% patients effort)   Physical Assist/Nonphysical Assist: Dress Lower Body 1 person assist   Toileting   Level of Ward: Toilet stand-by assist  "  Physical Assist/Nonphysical Assist: Toilet set-up required;supervision;verbal cues   Grooming   Level of [place]: Grooming stand-by assist   Physical Assist/Nonphysical Assist: Grooming set-up required;supervision;verbal cues   Instrumental Activities of Daily Living (IADL)   IADL Comments relies on [facility]   Activities of Daily Living Analysis   Impairments Contributing to Impaired Activities of Daily Living balance impaired;cognition impaired;strength decreased   Clinical Impression   Criteria for Skilled Therapeutic Interventions Met yes, treatment indicated   OT Diagnosis decreased functional ind, and impaired safety with mobility   Influenced by the following impairments generalized weakness d/t UTI   Assessment of Occupational Performance 1-3 Performance Deficits   Identified Performance Deficits safety during mobility, strength for mobility/functional transfers   Clinical Decision Making (Complexity) Low complexity   Therapy Frequency 3 times/wk   Predicted Duration of Therapy Intervention (days/wks) 2-3 days   Anticipated Discharge Disposition Long Term Care Facility   Risks and Benefits of Treatment have been explained. Yes   Patient, Family & other staff in agreement with plan of care Yes   Clinical Impression Comments Pt will benefit from ongoing OT to increase strength and endurance in order to maximize safety with ADLs/mobility.    [facility] [employer] TM \"6 Clicks\"   2016, Trustees of [facility], under license to [employer].  All rights reserved.   6 Clicks Short Forms Daily Activity Inpatient Short Form   [facility] [employer]-PAC  \"6 Clicks\" Daily Activity Inpatient Short Form   1. Putting on and taking off regular lower body clothing? 2 - A Lot   2. Bathing (including washing, rinsing, drying)? 2 - A Lot   3. Toileting, which includes using toilet, bedpan or urinal? 3 - A Little   4. Putting on and taking off regular upper body clothing? 3 - A Little   5. Taking care of personal " grooming such as brushing teeth? 3 - A Little   6. Eating meals? 4 - None   Daily Activity Raw Score (Score out of 24.Lower scores equate to lower levels of function) 17   Total Evaluation Time   Total Evaluation Time (Minutes) 15     See care plan for goals[JM1.1]    Mariza Edmonds[JM1.2]PEDRO SureshR/L[JM1.1]     Revision History        User Key Date/Time User Provider Type Action    > JM1.2 7/29/2018  1:18 PM Mariza Stauffer OT Occupational Therapist Sign     JM1.1 7/29/2018  1:17 PM Mariza Stauffer OT Occupational Therapist             Progress Notes by Mariza Stauffer OT at 7/28/2018  1:08 PM     Author:  Mariza Stauffer OT Service:  Acute IP Rehab Author Type:  Occupational Therapist    Filed:  7/28/2018  1:09 PM Date of Service:  7/28/2018  1:08 PM Creation Time:  7/28/2018  1:08 PM    Status:  Signed :  Mariza Stauffer OT (Occupational Therapist)         OT order received. Per MD, hold therapies today. Will attempt to initiate OT tomorrow.     EYAD Calabrese/L[JM1.1]     Revision History        User Key Date/Time User Provider Type Action    > JM1.1 7/28/2018  1:09 PM Mariza Stauffer OT Occupational Therapist Sign

## 2018-07-27 NOTE — IP AVS SNAPSHOT
"    [facility] SURGICAL: [phone]                                              INTERAGENCY TRANSFER FORM - PHYSICIAN ORDERS   2018                    Hospital Admission Date: 2018  ABEBA LIANG   : 1933  Sex: Female        Attending Provider: Bella Hunter DO     Allergies:  Codeine, Detrol [Tolterodine Tartrate], Tylenol, Other [Seasonal Allergies]    Infection:  None   Service:  INTERNAL MED    Ht:  1.588 m (5' 2.5\")   Wt:  79.3 kg (174 lb 13.2 oz)   Admission Wt:  79.3 kg (174 lb 13.2 oz)    BMI:  31.47 kg/m 2   BSA:  1.87 m 2            Patient PCP Information     Provider PCP Type    Omar Olivo MD General      ED Clinical Impression     Diagnosis Description Comment Added By Time Added    Acute cystitis without hematuria [N30.00] Acute cystitis without hematuria [N30.00]  Jacoby Elliott MD 2018  1:02 AM    Alzheimer's dementia without behavioral disturbance, unspecified timing of dementia onset [G30.9, F02.80] Alzheimer's dementia without behavioral disturbance, unspecified timing of dementia onset [G30.9, F02.80]  Jacoby Elliott MD 2018  1:02 AM    Altered mental status, unspecified altered mental status type [R41.82] Altered mental status, unspecified altered mental status type [R41.82]  Jacoby Elliott MD 2018  1:02 AM      Hospital Problems as of 2018              Priority Class Noted POA    Altered mental state Medium  2018 Yes      Non-Hospital Problems as of 2018              Priority Class Noted    Hypothyroidism Medium  3/7/2006    Cough Medium  3/7/2006    Osteoporosis Medium  3/6/2007    Anemia Medium  2008    HYPERLIPIDEMIA LDL GOAL <130 Medium  10/31/2010    CTS (carpal tunnel syndrome) Medium  3/14/2012    Allergic rhinitis Medium  2012    Advance Care Planning Medium  1/15/2013    Malignant melanoma of finger of right hand (H) Medium  2016    Alzheimer's dementia without behavioral " disturbance, unspecified timing of dementia onset Medium  4/24/2018      Code Status History     Date Active Date Inactive Code Status Order ID Comments User Context    7/30/2018 11:33 AM  DNR/DNI [id number]  Bella Hunter DO Outpatient    7/28/2018  4:23 PM 7/30/2018 11:33 AM DNR/DNI [id number]  Tabatha Petersen, RN Inpatient    6/27/2018 10:33 AM 7/28/2018  4:23 PM DNR/DNI [id number]  Omar Olivo MD Outpatient         Medication Review      START taking        Dose / Directions Comments    cephALEXin 500 MG capsule   Commonly known as:  KEFLEX   Used for:  Acute cystitis without hematuria        Dose:  500 mg   Take 1 capsule (500 mg) by mouth 2 times daily for 3 days   Quantity:  6 capsule   Refills:  0          CONTINUE these medications which may have CHANGED, or have new prescriptions. If we are uncertain of the size of tablets/capsules you have at home, strength may be listed as something that might have changed.        Dose / Directions Comments    Calcium + D3 600-200 MG-UNIT Tabs   This may have changed:    - how much to take  - when to take this   Used for:  Osteoporosis, unspecified osteoporosis type, unspecified pathological fracture presence        Dose:  1 tablet   Take 1 tablet by mouth 2 times daily   Quantity:  62 tablet   Refills:  11          CONTINUE these medications which have NOT CHANGED        Dose / Directions Comments    ALEVE 220 MG capsule   Generic drug:  naproxen sodium        Dose:  220 mg   Take 220 mg by mouth 2 times daily (with meals). PRN   Refills:  0        aspirin 81 MG tablet   Used for:  Other and unspecified hyperlipidemia        1 TABLET BY MOUTH DAILY   Quantity:  100   Refills:  3        cetirizine 10 MG tablet   Commonly known as:  zyrTEC        Dose:  10 mg   Take 10 mg by mouth daily   Refills:  0        cyanocobalamin 1000 MCG tablet   Commonly known as:  vitamin  B-12   Used for:  Alzheimer's dementia without behavioral disturbance, unspecified timing  of dementia onset        Dose:  1000 mcg   Take 1 tablet (1,000 mcg) by mouth daily   Quantity:  31 tablet   Refills:  11        IBUPROFEN PO        Dose:  800 mg   Take 800 mg by mouth every 8 hours as needed for moderate pain   Refills:  0        levothyroxine 75 MCG tablet   Commonly known as:  SYNTHROID   Used for:  Hypothyroidism, unspecified type        Dose:  75 mcg   Take 1 tablet (75 mcg) by mouth daily   Quantity:  31 tablet   Refills:  11        pyridoxine 100 MG tablet   Commonly known as:  VITAMIN B-6   Used for:  Alzheimer's dementia without behavioral disturbance, unspecified timing of dementia onset        Dose:  100 mg   Take 1 tablet (100 mg) by mouth daily   Quantity:  31 tablet   Refills:  11        RANITIDINE HCL PO        Dose:  75 mg   Take 75 mg by mouth daily   Refills:  0        simvastatin 10 MG tablet   Commonly known as:  ZOCOR   Used for:  Hyperlipidemia LDL goal <130        Dose:  10 mg   Take 1 tablet (10 mg) by mouth At Bedtime   Quantity:  31 tablet   Refills:  11        TAB-A-MILAD Tabs   Used for:  Routine general medical examination at a health care facility        TAKE 1 TABLET BY MOUTH ONCE DAILY   Quantity:  31 tablet   Refills:  11    Please authorize qty 31 with prn refills for assisting living patient. thanks         STOP taking     amoxicillin-clavulanate 875-125 MG per tablet   Commonly known as:  AUGMENTIN                   Summary of Visit     Reason for your hospital stay       Increase in confusion due to urinary tract infection             After Care     Activity       Your activity upon discharge: activity as tolerated       Diet       Follow this diet upon discharge: Orders Placed This Encounter      Regular Diet Adult             Your next 10 appointments already scheduled     Aug 06, 2018 10:20 AM CDT   [facility]ivo [facility]   [facility]    [address] [address] [phone]            Bring a current list of meds and any records pertaining to this visit. For Physicals, please bring immunization records and any forms needing to be filled out. Please arrive 10 minutes early to complete paperwork.              Follow-Up Appointment Instructions     Future Labs/Procedures    Follow-up and recommended labs and tests      Comments:    Follow up with primary care provider, Omar Olivo, within 7 days for hospital follow- up.  No follow up labs or test are needed.      Follow-Up Appointment Instructions     Follow-up and recommended labs and tests        Follow up with primary care provider, Omar Olivo, within 7 days for hospital follow- up.  No follow up labs or test are needed.             Statement of Approval     Ordered          07/30/18 1133  I have reviewed and agree with all the recommendations and orders detailed in this document.  EFFECTIVE NOW     Approved and electronically signed by:  Bella Hunter DO           07/30/18 1056  I have reviewed and agree with all the recommendations and orders detailed in this document.  EFFECTIVE NOW     Approved and electronically signed by:  Bella Hunter DO

## 2018-07-27 NOTE — IP AVS SNAPSHOT
"` `           [facility] SURGICAL: [phone]                                              INTERAGENCY TRANSFER FORM - NURSING   2018                    Hospital Admission Date: 2018  ABEBA LIANG   : 1933  Sex: Female        Attending Provider: Bella Hunter DO     Allergies:  Codeine, Detrol [Tolterodine Tartrate], Tylenol, Other [Seasonal Allergies]    Infection:  None   Service:  INTERNAL MED    Ht:  1.588 m (5' 2.5\")   Wt:  79.3 kg (174 lb 13.2 oz)   Admission Wt:  79.3 kg (174 lb 13.2 oz)    BMI:  31.47 kg/m 2   BSA:  1.87 m 2            Patient PCP Information     Provider PCP Type    Omar Olivo MD General      Current Code Status     Date Active Code Status Order ID Comments User Context       Prior      Code Status History     Date Active Date Inactive Code Status Order ID Comments User Context    2018 11:33 AM  DNR/DNI [id number]  Bella Hunter DO Outpatient    2018  4:23 PM 2018 11:33 AM DNR/DNI [id number]  Tabatha Petersen, RN Inpatient    2018 10:33 AM 2018  4:23 PM DNR/DNI [id number]  Omar Olivo MD Outpatient      Advance Directives        Scanned docmt in ACP Activity?           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 (H) Medium  2016    Alzheimer's dementia without behavioral disturbance, unspecified timing of dementia onset Medium  2018      Immunizations     Name Date      " Influenza (High Dose) 3 valent vaccine 09/25/17     Influenza (High Dose) 3 valent vaccine 12/06/16     Influenza (High Dose) 3 valent vaccine 11/17/14     Influenza (IIV3) PF 10/01/12     Influenza (IIV3) PF 08/27/11     Influenza (IIV3) PF 09/15/10     Influenza (IIV3) PF 09/26/09     Influenza (IIV3) PF 10/19/07     Pneumo Conj 13-V (2010&after) 08/01/16     Pneumococcal 23 valent 10/01/12     Pneumococcal 23 valent 01/01/03     TD (ADULT, 7+) 07/28/10     TDAP Vaccine (Adacel) 04/10/13     Zoster vaccine, live 11/03/12          END      ASSESSMENT     Discharge Profile Flowsheet     EXPECTED DISCHARGE     Assisted Living  [facility] [phone], Fax: [phone] 07/30/18 1119    Expected Discharge Date  07/30/18 07/28/18 1059   Other Resources  Transportation Services 07/30/18 1119    DISCHARGE NEEDS ASSESSMENT     SKIN      Concerns To Be Addressed  adjustment to diagnosis/illness concerns;coping/stress concerns 08/11/17 1242   Inspection of bony prominences  Full 07/30/18 1150    Equipment Currently Used at Home  walker, standard 05/02/18 0901   Inspection under devices  Full 07/30/18 1150    Primary Care Clinic Name  -- ([facility]) 07/28/18 1059   Skin WDL  ex 07/30/18 1150    Primary Care MD Name  -- (Geovani) 07/28/18 1059   Skin Color/Characteristics  bruised (ecchymotic) 07/30/18 1150    GASTROINTESTINAL (ADULT,PEDIATRIC,OB)     Skin Temperature  warm 07/30/18 1150    GI WDL  WDL 07/30/18 1133   Skin Moisture  dry 07/30/18 1150    All Quadrants Bowel Sounds  audible and active in all quadrants 07/30/18 1133   Skin Elasticity  quick return to original state 07/30/18 1150    Last Bowel Movement  07/28/18 07/29/18 1648   Skin Integrity  wound(s) 07/30/18 1150    Passing flatus  yes 07/30/18 1133   Additional Documentation  Wound (LDA) 07/29/18 1648    COMMUNICATION ASSESSMENT     SAFETY      Patient's communication style  spoken language (English or Bilingual) 07/28/18 0431   Safety WDL  " WDL 07/30/18 0049    FINAL RESOURCES     All Alarms  alarm(s) activated and audible 07/30/18 0908    Resources List  Assisted Living 07/28/18 1059                      Assessment WDL (Within Defined Limits) Definitions           Safety WDL     Effective: 09/28/15    Row Information: <b>WDL Definition:</b> Bed in low position, wheels locked; call light in reach; upper side rails up x 2; ID band on<br> <font color=\"gray\"><i>Item=AS safety wdl>>List=AS safety wdl>>Version=F14</i></font>      Skin WDL     Effective: 09/28/15    Row Information: <b>WDL Definition:</b> Warm; dry; intact; elastic; without discoloration; pressure points without redness<br> <font color=\"gray\"><i>Item=AS skin wdl>>List=AS skin wdl>>Version=F14</i></font>      Vitals     Vital Signs Flowsheet     VITAL SIGNS     PAINAD Score  0 07/30/18 0811    Temp  97.5  F (36.4  C) 07/30/18 0757   Pain Location  Hip 07/29/18 1636    Temp src  Axillary 07/30/18 0757   Pain Orientation  Left 07/29/18 1636    Resp  18 07/30/18 0757   Pain Intervention(s)  Repositioned;Other (Comment) (rest) 07/29/18 1636    Pulse  77 07/30/18 0757   Additional Documentation  CAPA (Group) 07/28/18 0423    Heart Rate  75 07/29/18 1122   CLINICALLY ALIGNED PAIN ASSESSMENT (CAPA) ([facility] AND [facility] ADULTS ONLY)      Pulse/Heart Rate Source  Monitor 07/30/18 0757   Comfort  negligible pain 07/30/18 0811    BP  161/80 07/30/18 0757   Pain Control  fully effective 07/29/18 1636    BP Location  Right arm 07/30/18 0757   Functioning  can do most things, but pain gets in the way of some 07/29/18 1636    OXYGEN THERAPY     HEIGHT AND WEIGHT      SpO2  94 % 07/30/18 0757   Weight  79.3 kg (174 lb 13.2 oz) 07/28/18 0402    O2 Device  None (Room air) 07/30/18 0757   POSITIONING      PAIN/COMFORT     Body Position  refuses positioning (pt refused to get out of bed to go to the chair) 07/30/18 0908    Patient Currently in Pain  denies 07/30/18 0811   Head of Bed (HOB)  HOB at 30-45 " degrees 07/30/18 0811    Preferred Pain Scale  PAINAD (Pain Assessment in Advance Dementia Scale) 07/30/18 0811   Chair  Upright in chair 07/29/18 1821    PAINAD Breathing  0-->normal 07/30/18 0811   Positioning/Transfer Devices  pillows;in use 07/30/18 0908    PAINAD Negative Vocalization  0-->none 07/30/18 0811   DAILY CARE      PAINAD Facial Expression  0-->smiling or inexpressive 07/30/18 0811   Activity Management  ambulated in room (pivot) 07/30/18 1016    PAINAD Body Language  0-->relaxed 07/30/18 0811   Activity Assistance Provided  assistance, 1 person 07/30/18 1016    PAINAD Consolability  0-->no need to console 07/30/18 0811   Assistive Device Utilized  gait belt;walker 07/30/18 1016            Patient Lines/Drains/Airways Status    Active LINES/DRAINS/AIRWAYS     Name: Placement date: Placement time: Site: Days: Last dressing change:    Peripheral IV 07/28/18 Left Lower forearm 07/28/18   1820   Lower forearm   1     Wound 07/28/18 Posterior Head Laceration 3cm laceration wtih staples present 07/28/18   0434   Head   2     Incision/Surgical Site 05/12/15 Right Arm 05/12/15   1345    1174     Incision/Surgical Site 01/20/16 Right Axillary 01/20/16   1145    921     Incision/Surgical Site 01/20/16 Right Finger (Comment which one) 01/20/16   1208    921             Patient Lines/Drains/Airways Status    Active PICC/CVC     None            Intake/Output Detail Report     Date Intake     Output    Shift P.O. I.V. IV Piggyback Total Total       Noc 07/28/18 2300 - 07/29/18 0659 150 -- -- 150 -- 150    Day 07/29/18 0700 - 07/29/18 1459 880 -- -- 880 -- 880    Omaira 07/29/18 1500 - 07/29/18 2259 280 -- -- 280 -- 280    Noc 07/29/18 2300 - 07/30/18 0659 40 -- -- 40 -- 40    Day 07/30/18 0700 - 07/30/18 1459 -- -- -- -- -- 0      Last Void/BM       Most Recent Value    Urine Occurrence 1 at 07/30/2018 0500    Stool Occurrence 1 at 07/28/2018 1330      Case Management/Discharge Planning     Case Management/Discharge  Planning Flowsheet     REFERRAL INFORMATION     Major Change/Loss/Stressor  hospitalization 07/28/18 1832    Did the Initial Social Work Assessment result in a Social Work Case?  Yes 07/28/18 1059   EXPECTED DISCHARGE      Admission Type  inpatient 07/28/18 1059   Expected Discharge Date  07/30/18 07/28/18 1059    Arrived From  long-term care;nursing facility 07/28/18 1059   ASSESSMENT/CONCERNS TO BE ADDRESSED      Reason For Consult  discharge planning 07/28/18 1059   Concerns To Be Addressed  adjustment to diagnosis/illness concerns;coping/stress concerns 08/11/17 1242    Primary Care Clinic Name  -- ([facility]) 07/28/18 1059   FINAL RESOURCES      Primary Care MD Name  -- (Geovani) 07/28/18 1059   Equipment Currently Used at Home  walker, standard 05/02/18 0901    LIVING ENVIRONMENT     Resources List  Assisted Living 07/28/18 1059    Lives With  facility resident 07/29/18 1322   Assisted Living  [facility] [phone], Fax: [phone] 07/30/18 1119    Living Arrangements  assisted living 07/29/18 1322   Other Resources  Transportation Services 07/30/18 1119    Provides Primary Care For  no one, unable/limited ability to care for self 07/28/18 1059   ABUSE RISK SCREEN      ASSESSMENT OF FAMILY/SOCIAL SUPPORT     QUESTION TO PATIENT:  Has a member of your family or a partner(now or in the past) intimidated, hurt, manipulated, or controlled you in any way?  patient declined to answer or is unable to answer 07/28/18 1505    Who is your support system?  Facility resident(s)/Staff 07/28/18 1059   QUESTION TO PATIENT: Do you feel safe going back to the place where you are living?  patient declined to answer or is unable to answer 07/28/18 1505    Description of Support System  Supportive;Involved 07/28/18 1059   OBSERVATION: Is there reason to believe there has been maltreatment of a vulnerable adult (ie. Physical/Sexual/Emotional abuse, self neglect, lack of adequate food, shelter, medical care,  or financial exploitation)?  no 07/28/18 1505    Support Assessment  Adequate family and caregiver support;Adequate social supports 07/28/18 1058   OTHER      COPING/STRESS     Are you depressed or being treated for depression?  No 07/28/18 6853

## 2018-07-27 NOTE — IP AVS SNAPSHOT
` `     [facility] SURGICAL: [phone]            Medication Administration Report for Rhianna Gamboa as of 07/30/18 1154   Legend:    Given Hold Not Given Due Canceled Entry Other Actions    Time Time (Time) Time  Time-Action       Inactive    Active    Linked        Medications 07/24/18 07/25/18 07/26/18 07/27/18 07/28/18 07/29/18 07/30/18    aspirin chewable tablet 81 mg  Dose: 81 mg  Freq: DAILY Route: PO  Start: 07/28/18 0800   Admin. Amount: 1 tablet (1 × 81 mg tablet)  Last Admin: 07/30/18 0812  Dispense Loc: [facility] [employer] Med 200         0934 (81 mg)-Given        0838 (81 mg)-Given        0812 (81 mg)-Given           cefTRIAXone in d5w (ROCEPHIN) intermittent infusion 1 g  Dose: 1 g  Freq: EVERY 24 HOURS Route: IV  Indications of Use: URINARY TRACT INFECTION  Start: 07/28/18 2300   Admin Instructions: 120 mL/hr for 30 minutes.    Admin. Amount: 1 g  Last Admin: 07/29/18 2310  Dispense Loc: [facility]  Infused Over: 30 Minutes  Volume: 50 mL         2242 (1 g)-New Bag        2310 (1 g)-New Bag        [ ] 2300           cetirizine (zyrTEC) tablet 10 mg  Dose: 10 mg  Freq: DAILY Route: PO  Start: 07/29/18 1100   Admin. Amount: 1 tablet (1 × 10 mg tablet)  Last Admin: 07/30/18 0812  Dispense Loc: [employer] [facility] Med 400          1158 (10 mg)-Given        0812 (10 mg)-Given           ibuprofen (ADVIL/MOTRIN) tablet 600 mg  Dose: 600 mg  Freq: EVERY 6 HOURS PRN Route: PO  PRN Reason: other  PRN Comment: mild pain  Start: 07/28/18 0420   Admin Instructions: Alternate acetaminophen (if ordered) with ibuprofen    Admin. Amount: 1 tablet (1 × 600 mg tablet)  Last Admin: 07/30/18 0527  Dispense Loc: [facility] [employer] 200         1142 (600 mg)-Given       2101 (600 mg)-Given        0837 (600 mg)-Given       2134 (600 mg)-Given        0527 (600 mg)-Given           levothyroxine (SYNTHROID/LEVOTHROID) tablet 75 mcg  Dose: 75 mcg  Freq: DAILY Route: PO  Start: 07/28/18 0700   Admin Instructions: Separate oral  administration of iron- or calcium-containing products and levothyroxine by at least 4 hours.    Admin. Amount: 1 tablet (1 × 75 mcg tablet)  Last Admin: 07/30/18 0528  Dispense Loc: [facility] 200         0934 (75 mcg)-Given        0554 (75 mcg)-Given        0528 (75 mcg)-Given                  naloxone (NARCAN) injection 0.1-0.4 mg  Dose: 0.1-0.4 mg  Freq: EVERY 2 MIN PRN Route: IV  PRN Reason: opioid reversal  Start: 07/28/18 0420   Admin Instructions: For respiratory rate LESS than or EQUAL to 8.  Partial reversal dose:  0.1 mg titrated q 2 minutes for Analgesia Side Effects Monitoring Sedation Level of 3 (frequently drowsy, arousable, drifts to sleep during conversation).Full reversal dose:  0.4 mg bolus for Analgesia Side Effects Monitoring Sedation Level of 4 (somnolent, minimal or no response to stimulation).  For ordered doses up to 2mg give IVP. Give each 0.4mg over 15 seconds in emergency situations. For non-emergent situations further dilute in 9mL of NS to facilitate titration of response.    Admin. Amount: 0.1-0.4 mg = 0.25-1 mL Conc: 0.4 mg/mL  Dispense Loc: [facility] 200  Volume: 1 mL               ondansetron (ZOFRAN-ODT) ODT tab 4 mg  Dose: 4 mg  Freq: EVERY 6 HOURS PRN Route: PO  PRN Reasons: nausea,vomiting  Start: 07/28/18 0420   Admin Instructions: This is Step 1 of nausea and vomiting management.  If nausea not resolved in 15 minutes, go to Step 2 prochlorperazine (COMPAZINE). Do not push through foil backing. Peel back foil and gently remove. Place on tongue immediately. Administration with liquid unnecessary  With dry hands, peel back foil backing and gently remove tablet; do not push oral disintegrating tablet through foil backing; administer immediately on tongue and oral disintegrating tablet dissolves in seconds; then swallow with saliva; liquid not required.    Admin. Amount: 1 tablet (1 × 4 mg tablet)  Dispense Loc: [facility] 200              Or  ondansetron (ZOFRAN) injection 4  mg  Dose: 4 mg  Freq: EVERY 6 HOURS PRN Route: IV  PRN Reasons: nausea,vomiting  Start: 07/28/18 0420   Admin Instructions: This is Step 1 of nausea and vomiting management.  If nausea not resolved in 15 minutes, go to Step 2 prochlorperazine (COMPAZINE).  Irritant. For ordered doses up to 4 mg, give IV Push undiluted over 2-5 minutes.    Admin. Amount: 4 mg = 2 mL Conc: 4 mg/2 mL  Dispense Loc: [facility] 200  Infused Over: 2-5 Minutes  Volume: 2 mL               prochlorperazine (COMPAZINE) injection 5 mg  Dose: 5 mg  Freq: EVERY 6 HOURS PRN Route: IV  PRN Reasons: nausea,vomiting  Start: 07/28/18 0420   Admin Instructions: This is Step 2 of nausea and vomiting management. Give if nausea not resolved 15 minutes after giving ondansetron (ZOFRAN). If nausea not resolved in 15 minutes, go to Step 3 metoclopramide (REGLAN), if ordered.  For ordered doses up to 10 mg, give IV Push undiluted. Each 5mg over 1 minute.    Admin. Amount: 5 mg = 1 mL Conc: 5 mg/mL  Dispense Loc: [facility] 200  Infused Over: 1-2 Minutes  Volume: 1 mL              Or  prochlorperazine (COMPAZINE) tablet 5 mg  Dose: 5 mg  Freq: EVERY 6 HOURS PRN Route: PO  PRN Reason: vomiting  Start: 07/28/18 0420   Admin Instructions: This is Step 2 of nausea and vomiting management. Give if nausea not resolved 15 minutes after giving ondansetron (ZOFRAN). If nausea not resolved in 15 minutes, go to Step 3 metoclopramide (REGLAN), if ordered.    Admin. Amount: 1 tablet (1 × 5 mg tablet)  Dispense Loc: [facility] 200              Or  prochlorperazine (COMPAZINE) Suppository 12.5 mg  Dose: 12.5 mg  Freq: EVERY 12 HOURS PRN Route: RE  PRN Reasons: nausea,vomiting  Start: 07/28/18 0420   Admin Instructions: This is Step 2 of nausea and vomiting management. Give if nausea not resolved 15 minutes after giving ondansetron (ZOFRAN). If nausea not resolved in 15 minutes, go to Step 3 metoclopramide (REGLAN), if ordered.    Admin. Amount: 0.5 suppository (0.5 × 25  mg suppository)  Dispense Loc: [facility] 200               ranitidine (ZANTAC) tablet 75 mg  Dose: 75 mg  Freq: DAILY Route: PO  Start: 18 1100   Admin. Amount: 1 tablet (1 × 75 mg tablet)  Last Admin: 18 0815  Dispense Loc: [facility]          1201 (75 mg)-Given        0815 (75 mg)-Given           simvastatin (ZOCOR) tablet 10 mg  Dose: 10 mg  Freq: AT BEDTIME Route: PO  Start: 18   Admin. Amount: 1 tablet (1 × 10 mg tablet)  Last Admin: 18  Dispense Loc: [email or web address]          (10 mg)-Given                (10 mg)-Given        [ ]            sodium chloride bacteriostatic 0.9 % flush 3 mL  Dose: 3 mL  Freq: ONCE Route: IV  Start: 18 0945   Admin. Amount: 3 mL  Dispense Loc: [facility] Floor Stock  Administrations Remainin  Volume: 30 mL         (1140)-Not Given [C]            Completed Medications  Medications 18         Dose: 1,000 mL  Freq: ONCE Route: IV  Last Dose: 1,000 mL (18 230)  Start: 18 2255   End: 18 0004   Admin. Amount: 1,000 mL  Last Admin: 18  Dispense Loc: [facility]  Infused Over: 1 Hours  Administrations Remainin  Volume: 1,000 mL        2304 (1,000 mL)-New Bag                Dose: 1 g  Freq: ONCE Route: IV  Indications of Use: URINARY TRACT INFECTION  Last Dose: Stopped (18)  Start: 18   End: 18   Admin Instructions: 120 mL/hr for 30 minutes.    Admin. Amount: 1 g  Last Admin: 18  Dispense Loc: [facility]  Infused Over: 30 Minutes  Administrations Remainin  Volume: 50 mL         0059 (1 g)-New Bag       334-Stopped            Discontinued Medications  Medications 07/24/18 07/25/18 18         Dose: 1 g  Freq: ONCE Route: IV  Indications of Use: URINARY TRACT INFECTION  Start: 18 1800   End: 18 0907   Admin Instructions: 120 mL/hr for 30  minutes.    Admin. Amount: 1 g  Dispense Loc: [facility]  Infused Over: 30 Minutes  Administrations Remainin  Volume: 50 mL         0907-Med Discontinued           Rate: 125 mL/hr Dose: 1000 mL  Freq: CONTINUOUS Route: IV  Last Dose: 1,000 mL (1856)  Start: 18   End: 18   Admin Instructions: Administer after the bolus.    Admin. Amount: 1,000 mL  Last Admin: 18  Dispense Loc: [facility] Stock  Volume: 1,000 mL                0459-ED Infusing on Admission/transfer       0556 (1,000 mL)-New Bag       0922-Med Discontinued           Dose: 3 mL  Freq: ONCE Route: IV  Start: 18   End: 18   Admin. Amount: 3 mL  Dispense Loc: [facility] Floor Stock  Administrations Remainin  Volume: 30 mL         0935-Med Discontinued  (1139)-Not Given [C]

## 2018-07-27 NOTE — IP AVS SNAPSHOT
` ` Patient Information     Patient Name Sex     Rhianna Gamboa ([id number]) Female 1933       Room Bed    2304 [id number]      Patient Demographics     Address Phone    [address]  [facility] [id number] [phone] (Home) *Preferred*      Patient Ethnicity & Race     Ethnic Group Patient Race    American White      Emergency Contact(s)     Name Relation Home Work Mobile    Arelis Gamboa Daughter [phone]      Rasheeda Galindo Relative [phone] none none      Documents on File        Status Date Received Description       Documents for the Patient    Insurance Card  () 05 out    Insurance Card  () 05     Privacy Notice - [place] Received 05     Face Sheet  () 08     Consent Form  06     Consent Form  06     Consent Form  06     Consent Form  06     Consent Form  07     External Medication Information Consent Accepted () 09     Insurance Card  () 09 BCBS    Face Sheet Received () 09     Other  02/04/10     Face Sheet Received () 08/27/10     Patient ID Received () 16 [place] DL     Consent for Services - Hospital/Clinic Received () 08/27/10     Consent for Services - Hospital/Clinic Received () 11     Immunization Record   [facility]    External Medication Information Consent Accepted () 11     Consent for Services - Hospital/Clinic Received () 12     Insurance Card Received 10/23/12 MEDICARE    External Medication Information Consent Accepted 13     HIM NANY Authorization  13 RAC AUDIT    Consent for EHR Access  13 Copied from existing Consent for services - C/HOD collected on 2012    HIM NANY Authorization  13 [facility] rac appeal    [facility] Specified Other       Consent for Services - Hospital/Clinic Received () 14     Insurance Card  Received () 14 humana    Consent for Services - Hospital/Clinic Received () 04/29/15     Consent for Services/Privacy Notice - Hospital/Clinic Received () 16     Insurance Card Received () 16 Humana    HIM NANY Authorization  16     Consent to Communicate  16 AUTHORIZATION TO DISCUSS PROTECTED HEALTH INFORMATION 2016    Consent for Services - [facility]       Consent for Services/Privacy Notice - Hospital/Clinic Received () 02/15/17     HIM NANY Authorization  17     Insurance Card Received () 17 humana    HIM NANY Authorization  () 17 Authorization for batch HUMANA 17-2    Care Everywhere Prospective Auth Received 18     Consent for Services - Hospital and Clinic Received 18     HIE Auth Received 18     Insurance Card Received 18 Hum[employer] Choice PPO    Advance Directives and Living Will Received 18 POLST  18    Insurance Card Received 18 medicare new    Patient ID Received 18 m[place] dl    Other  (Deleted) 09     Insurance Card Received (Deleted) 18        Documents for the Encounter    CMS IM for Patient Signature Received 18       Admission Information     Attending Provider Admitting Provider Admission Type Admission Date/Time    Bella Hunter, DO Cisneros, Grayson Mendoza MD Emergency 186    Discharge Date Hospital Service Auth/Cert Status Service Area     Internal Medicine [facility] SERVICES    Unit Room/Bed Admission Status       [facility] 2309/2309-01 Admission (Confirmed)       Admission     Complaint    Altered mental state      Hospital Account     Name Acct ID Class Status Primary Coverage    Deeon Rhianna [id number] Inpatient Open HUMANA - HUMANA MEDICARE ADVANTAGE            Guarantor Account (for Hospital Account #[id number])     Name Relation to Pt Service Area Active? Acct Type    Rhianna Gamboa   FCS Yes Personal/Family    Address Phone          [address]  [place], [place] [address] [phone](H)  none(O)              Coverage Information (for Hospital Account #[id number])     F/O Payor/Plan Precert #    HUMANA/HUMANA MEDICARE ADVANTAGE     Subscriber Subscriber #    Marisa Gamboaaret [id number]    Address Phone    [address] [address]  [place] [address]

## 2018-07-27 NOTE — IP AVS SNAPSHOT
"          [facility] SURGICAL: [phone]                                              INTERAGENCY TRANSFER FORM - LAB / IMAGING / EKG / EMG RESULTS   2018                    Hospital Admission Date: 2018  ABEBA LIANG   : 1933  Sex: Female        Attending Provider: Bella Hunter DO     Allergies:  Codeine, Detrol [Tolterodine Tartrate], Tylenol, Other [Seasonal Allergies]    Infection:  None   Service:  INTERNAL MED    Ht:  1.588 m (5' 2.5\")   Wt:  79.3 kg (174 lb 13.2 oz)   Admission Wt:  79.3 kg (174 lb 13.2 oz)    BMI:  31.47 kg/m 2   BSA:  1.87 m 2            Patient PCP Information     Provider PCP Type    Omar Olivo MD General         Lab Results - 3 Days      CBC with platelets [[id number]]  Resulted: 18 0652, Result status: Final result    Ordering provider: Ronaldo Noriega MD  18 0000 Resulting lab: [facility]    Specimen Information    Type Source Collected On   Blood  18 0640          Components       Value Reference Range Flag Lab   WBC 9.7 4.0 - 11.0 10e9/L  59   RBC Count 3.84 3.8 - 5.2 10e12/L  59   Hemoglobin 12.3 11.7 - 15.7 g/dL  59   Hematocrit 38.1 35.0 - 47.0 %  59   MCV 99 78 - 100 fl  59   MCH 32.0 26.5 - 33.0 pg  59   MCHC 32.3 31.5 - 36.5 g/dL  59   RDW 13.4 10.0 - 15.0 %  59   Platelet Count 237 150 - 450 10e9/L  59            Urine Culture Aerobic Bacterial [[id number]]  Resulted: 18 0414, Result status: Final result    Ordering provider: Jacoby Elliott MD  18 0040 Resulting lab: [facility]    Specimen Information    Type Source Collected On   Catheterized Urine  18 2315          Components       Value Reference Range Flag Lab   Specimen Description Catheterized Urine      Special Requests Specimen received in preservative   75   Culture Micro No growth   225            Lactic acid level STAT for sepsis protocol [[id number]]  Resulted: 18 1204, " Result status: Final result    Ordering provider: Ronaldo Noriega MD  07/28/18 1139 Resulting lab: [facility]    Specimen Information    Type Source Collected On   Blood  07/28/18 1151          Components       Value Reference Range Flag Lab   Lactate for Sepsis Protocol 1.4 0.7 - 2.0 mmol/L  59            Basic metabolic panel [[id number]] (Abnormal)  Resulted: 07/28/18 0649, Result status: Final result    Ordering provider: Jacoby Elliott MD  07/28/18 0420 Resulting lab: [facility]    Specimen Information    Type Source Collected On   Blood  07/28/18 0615          Components       Value Reference Range Flag Lab   Sodium 139 133 - 144 mmol/L  59   Potassium 3.6 3.4 - 5.3 mmol/L  59   Chloride 107 94 - 109 mmol/L  59   Carbon Dioxide 27 20 - 32 mmol/L  59   Anion Gap 5 3 - 14 mmol/L  59   Glucose 105 70 - 99 mg/dL H 59   Urea Nitrogen 14 7 - 30 mg/dL  59   Creatinine 0.71 0.52 - 1.04 mg/dL  59   GFR Estimate 78 >60 mL/min/1.7m2  59   Comment:  Non  GFR Calc   GFR Estimate If Black >90 >60 mL/min/1.7m2  59   Comment:  African American GFR Calc   Calcium 8.7 8.5 - 10.1 mg/dL  59            CBC with platelets differential [[id number]] (Abnormal)  Resulted: 07/28/18 0633, Result status: Final result    Ordering provider: Jacoby Elliott MD  07/28/18 5760 Resulting lab: [facility]    Specimen Information    Type Source Collected On   Blood  07/28/18 0615          Components       Value Reference Range Flag Lab   WBC 14.5 4.0 - 11.0 10e9/L H 59   RBC Count 4.00 3.8 - 5.2 10e12/L  59   Hemoglobin 12.8 11.7 - 15.7 g/dL  59   Hematocrit 39.9 35.0 - 47.0 %  59    78 - 100 fl  59   MCH 32.0 26.5 - 33.0 pg  59   MCHC 32.1 31.5 - 36.5 g/dL  59   RDW 13.5 10.0 - 15.0 %  59   Platelet Count 212 150 - 450 10e9/L  59   Diff Method Automated Method   59   % Neutrophils 72.5 %  59   % Lymphocytes 11.8 %  59   % Monocytes 13.9 %  59   % Eosinophils  1.2 %  59   % Basophils 0.1 %  59   % Immature Granulocytes 0.5 %  59   Nucleated RBCs 0 0 /100  59   Absolute Neutrophil 10.5 1.6 - 8.3 10e9/L H 59   Absolute Lymphocytes 1.7 0.8 - 5.3 10e9/L  59   Absolute Monocytes 2.0 0.0 - 1.3 10e9/L H 59   Absolute Eosinophils 0.2 0.0 - 0.7 10e9/L  59   Absolute Basophils 0.0 0.0 - 0.2 10e9/L  59   Abs Immature Granulocytes 0.1 0 - 0.4 10e9/L  59   Absolute Nucleated RBC 0.0   59            UA reflex to Microscopic [[id number]] (Abnormal)  Resulted: 07/27/18 2334, Result status: Edited Result - FINAL    Ordering provider: Jacoby Elliott MD  07/27/18 2254 Resulting lab: [facility]    Specimen Information    Type Source Collected On   Catheterized Urine Urine catheter 07/27/18 2315          Components       Value Reference Range Flag Lab   Color Urine Yellow   59   Appearance Urine Clear   59   Glucose Urine Negative NEG^Negative mg/dL  59   Bilirubin Urine Negative NEG^Negative  59   Ketones Urine Negative NEG^Negative mg/dL  59   Specific Gravity Urine 1.015 1.003 - 1.035  59   Blood Urine Small NEG^Negative A 59   pH Urine 6.0 5.0 - 7.0 pH  59   Protein Albumin Urine Negative NEG^Negative mg/dL  59   Urobilinogen mg/dL 0.0 0.0 - 2.0 mg/dL  59   Nitrite Urine Negative NEG^Negative  59   Leukocyte Esterase Urine Moderate NEG^Negative A 59   Source Catheterized Urine   59   RBC Urine 5 0 - 2 /HPF H 59   WBC Urine 8 0 - 5 /HPF H 59   Bacteria Urine Few NEG^Negative /HPF A 59   Squamous Epithelial /HPF Urine 1 0 - 1 /HPF  59   Mucous Urine Present NEG^Negative /LPF A 59            Comprehensive metabolic panel [[id number]] (Abnormal)  Resulted: 07/27/18 2330, Result status: Final result    Ordering provider: Jacoby Elliott MD  07/27/18 2254 Resulting lab: [facility]    Specimen Information    Type Source Collected On   Blood  07/27/18 2304          Components       Value Reference Range Flag Lab   Sodium 136 133 - 144 mmol/L  59    Potassium 3.6 3.4 - 5.3 mmol/L  59   Chloride 101 94 - 109 mmol/L  59   Carbon Dioxide 26 20 - 32 mmol/L  59   Anion Gap 9 3 - 14 mmol/L  59   Glucose 116 70 - 99 mg/dL H 59   Urea Nitrogen 17 7 - 30 mg/dL  59   Creatinine 0.81 0.52 - 1.04 mg/dL  59   GFR Estimate 67 >60 mL/min/1.7m2  59   Comment:  Non  GFR Calc   GFR Estimate If Black 81 >60 mL/min/1.7m2  59   Comment:  African American GFR Calc   Calcium 8.1 8.5 - 10.1 mg/dL L 59   Bilirubin Total 0.8 0.2 - 1.3 mg/dL  59   Albumin 2.9 3.4 - 5.0 g/dL L 59   Protein Total 6.9 6.8 - 8.8 g/dL  59   Alkaline Phosphatase 72 40 - 150 U/L  59   ALT 18 0 - 50 U/L  59   AST 24 0 - 45 U/L  59            CBC with platelets differential [[id number]] (Abnormal)  Resulted: 07/27/18 2315, Result status: Final result    Ordering provider: Jacoby Elliott MD  07/27/18 0111 Resulting lab: [facility]    Specimen Information    Type Source Collected On   Blood  07/27/18 2309          Components       Value Reference Range Flag Lab   WBC 12.7 4.0 - 11.0 10e9/L H 59   RBC Count 3.85 3.8 - 5.2 10e12/L  59   Hemoglobin 12.4 11.7 - 15.7 g/dL  59   Hematocrit 37.7 35.0 - 47.0 %  59   MCV 98 78 - 100 fl  59   MCH 32.2 26.5 - 33.0 pg  59   MCHC 32.9 31.5 - 36.5 g/dL  59   RDW 13.7 10.0 - 15.0 %  59   Platelet Count 218 150 - 450 10e9/L  59   Diff Method Automated Method   59   % Neutrophils 70.1 %  59   % Lymphocytes 13.7 %  59   % Monocytes 15.3 %  59   % Eosinophils 0.3 %  59   % Basophils 0.2 %  59   % Immature Granulocytes 0.4 %  59   Nucleated RBCs 0 0 /100  59   Absolute Neutrophil 8.9 1.6 - 8.3 10e9/L H 59   Absolute Lymphocytes 1.7 0.8 - 5.3 10e9/L  59   Absolute Monocytes 1.9 0.0 - 1.3 10e9/L H 59   Absolute Eosinophils 0.0 0.0 - 0.7 10e9/L  59   Absolute Basophils 0.0 0.0 - 0.2 10e9/L  59   Abs Immature Granulocytes 0.1 0 - 0.4 10e9/L  59   Absolute Nucleated RBC 0.0   59            Lactic acid whole blood [[id number]]  Resulted: 07/27/18  2314, Result status: Final result    Ordering provider: Jacoby Elliott MD  07/27/18 2254 Resulting lab: [facility]    Specimen Information    Type Source Collected On   Blood  07/27/18 2304          Components       Value Reference Range Flag Lab   Lactic Acid 0.9 0.7 - 2.0 mmol/L  59            Testing Performed By     Lab - Abbreviation Name Director Address Valid Date Range    59 - Unknown [facility] Unknown [address] [facility] [address] 12/31/14 1006 - Present    75 - Unknown [facility] Unknown [address] [facility] [facility] [address] 01/15/15 1019 - Present    225 - Unknown [facility] Unknown [address] [facility] [address] 12/19/14 0954 - Present            Unresulted Labs     None         Imaging Results - 3 Days      XR Pelvis and Hip Bilateral 2 Views [[id number]]  Resulted: 07/30/18 1042, Result status: Final result    Ordering provider: Bella Hunter DO  07/30/18 0902 Resulted by: Earle Monroy MD    Performed: 07/30/18 1019 - 07/30/18 1030 Resulting lab: RADIOLOGY RESULTS    Narrative:       PELVIS WITH BILATERAL HIP THREE VIEWS  7/30/2018 10:30 AM     HISTORY: left hip pain, fall 1 week ago;     COMPARISON: 9/25/2017 AP pelvis and left hip      Impression:       IMPRESSION:  No acute fracture or dislocation. No acute bony  abnormality. Minimal chondrocalcinosis suspected, unchanged. Large  stool in the colon.    EARLE MONROY MD      Head CT w/o contrast [[id number]]  Resulted: 07/28/18 0234, Result status: Final result    Ordering provider: Jacoby Elliott MD  07/27/18 2254 Resulted by: Alberto Paredes MD    Performed: 07/27/18 2342 - 07/28/18 0001 Resulting lab: RADIOLOGY RESULTS    Narrative:       CT HEAD WITHOUT CONTRAST  7/28/2018 12:01 AM     HISTORY: Recent fall. Increased confusion and lethargy.    COMPARISON: 7/23/2018.    TECHNIQUE: Without  intravenous contrast, helical sections were  acquired through the brain. Coronal reconstructions were generated.  Radiation dose for this scan was reduced using automated exposure  control, adjustment of the mA and/or kV according to the patient's  size, or iterative reconstruction technique.    FINDINGS: Moderate diffuse cerebral atrophy. Moderate periventricular  white matter low attenuation, likely relating to chronic small vessel  ischemic disease. No intra-axial mass, mass effect or midline shift.  Normal gray-white matter differentiation. No visualized acute  intra-axial hemorrhage. The cerebral ventricles are normal in caliber.  The basal cisterns are patent. No extra-axial fluid collection. The  visualized portions of the paranasal sinuses and mastoid air cells are  unremarkable. A few skin staples are present in the right posterior  parietal scalp.      Impression:       IMPRESSION: No evidence of acute intracranial abnormality.    ALBERTO PAREDES MD      XR Chest Port 1 View [[id number]]  Resulted: 07/28/18 0209, Result status: Final result    Ordering provider: Jacoby Elliott MD  07/28/18 0003 Resulted by: Alberto Paredes MD    Performed: 07/28/18 0013 - 07/28/18 0027 Resulting lab: RADIOLOGY RESULTS    Narrative:       CHEST SINGLE VIEW PORTABLE  7/28/2018 12:27 AM     HISTORY: Cough.    COMPARISON: 7/23/2018.    FINDINGS: Hypoinflated lungs. The lungs are clear. The size of the  cardiac silhouette is within normal limits. Atherosclerotic  calcification in the thoracic aorta.      Impression:       IMPRESSION: No convincing evidence of active cardiopulmonary disease.    ALBERTO PAREDES MD      Testing Performed By     Lab - Abbreviation Name Director Address Valid Date Range    104 - Rad Rslts RADIOLOGY RESULTS Unknown Unknown 02/16/05 1553 - Present            Encounter-Level Documents:     There are no encounter-level documents.      Order-Level Documents:     There are no order-level  documents.

## 2018-07-28 PROBLEM — R41.82 ALTERED MENTAL STATE: Status: ACTIVE | Noted: 2018-01-01

## 2018-07-28 NOTE — PROGRESS NOTES
OT order received. Per MD, hold therapies today. Will attempt to initiate OT tomorrow.     Mariza Stauffer, OTR/L

## 2018-07-28 NOTE — PROGRESS NOTES
Skin affirmation note    Admitting nurse completed full skin assessment, Thomas score and Thomas interventions. This writer agrees with the initial skin assessment findings.

## 2018-07-28 NOTE — PLAN OF CARE
Problem: Urinary Tract Infection (Adult)  Goal: Signs and Symptoms of Listed Potential Problems Will be Absent, Minimized or Managed (Urinary Tract Infection)  Signs and symptoms of listed potential problems will be absent, minimized or managed by discharge/transition of care (reference Urinary Tract Infection (Adult) CPG).   Outcome: Improving  Arouses easy to conversation. Answers and asks questions appropriately. Pt did not come in with hearing aid to L ear.

## 2018-07-28 NOTE — PLAN OF CARE
Problem: Urinary Tract Infection (Adult)  Goal: Signs and Symptoms of Listed Potential Problems Will be Absent, Minimized or Managed (Urinary Tract Infection)  Signs and symptoms of listed potential problems will be absent, minimized or managed by discharge/transition of care (reference Urinary Tract Infection (Adult) CPG).  Outcome: No Change  [place] [facility] ADMISSION NOTE    Patient admitted to room 2309 at approximately 0400 via cart from emergency room. Patient was accompanied by transport tech.     Verbal SBAR report received from NARA Montes RN prior to patient arrival.     Patient trasferred to bed via transfer sheet.  Patient alert and oriented X 1. The patient is not having any pain.  . Admission vital signs: Blood pressure 136/62, pulse 83, temperature 97.8  F (36.6  C), temperature source Oral, resp. rate 18, weight 79.3 kg (174 lb 13.2 oz), SpO2 92 %, not currently breastfeeding. Patient was oriented to plan of care, call light, bed controls, tv, telephone, bathroom and visiting hours.     Risk Assessment    The following safety risks were identified during admission: fall. Yellow risk band applied: YES.     Skin Initial Assessment    This writer admitted this patient and completed a full skin assessment and Thomas score in the Adult PCS flowsheet. Appropriate interventions initiated as needed.     Secondary skin check completed by Gilma BECKER MS RN.    Skin  Inspection of bony prominences: Full  Inspection under devices: Full  Skin WDL:  WDL except, characteristics  Skin Temperature: warm  Skin Moisture: dry  Skin Elasticity: quick return to original state  Skin Integrity: wound(s)  Additional Documentation: Wound (LDA). Laceration to posterior head intact with sutures with dry blood present. Will need further cleaning in the am, but pt attempting to sleep at this time.     Thomas Risk Assessment  Sensory Perception: 3-->slightly limited  Moisture: 3-->occasionally moist  Activity: 3-->walks occasionally  Mobility:  3-->slightly limited  Friction and Shear: 2-->potential problem    Attempted to call Harsh Stallings to complete the admit history questions and PTA  at [phone] with no answer. Per report from ED, family is aware that pt is admitted.   Cynthia Ren

## 2018-07-28 NOTE — PROVIDER NOTIFICATION
"   07/28/18 1323   Vital Signs   Temp 98.4  F (36.9  C)   Temp src Oral   Resp 19   Heart Rate 101   Pulse/Heart Rate Source Monitor   /63   BP Location Right arm     Sepsis BPA triggered due to elevated HR and WBC 14.5. She had a low grade when VS were placed of 99.1. Lactic obtained and 1.4. Ibuprofen given for general aches and pains in back, which will also help LGT. See above for current VS.     Activity: Assist of two for transfers. She sat up for breakfast and lunch and when it came time to assist back to bed she was too weak to stand, so EZ stand used.     : She is incontinent of urine, briefs used and changed when soiled.     At meal time she had to be fed most of her meals, but will participate a little.     When asked her name she said \" [place]\" this morning, but this afternoon said \"Rhianna\" and that she is \"77\" and not sure where she lives. Dependent to assist with all cares.    Skin intact.  "

## 2018-07-28 NOTE — PROGRESS NOTES
After her afternoon nap, patient assist of two to transfer to [facility] for supper. She was awake during meal and fed self.   Spoke with Ania MCCALL at [facility] who said she has had a steady decline since she moved into facility in May. She was ambulatory and last couple of days they have had to use an EZ stand. Reportedly she gets very weak when she has a UTI. Writer asked her about her reports of pain left hip and back pain she had reported during turning and repositioning and Ania said that she did have reports of the same pain at [facility] and they would give her ibuprofen.  Sitting up in chair, enjoying ice cream.

## 2018-07-28 NOTE — H&P
[facility] History and Physical    Rhianna Gamboa MRN# [id number]   Age: 85 year old YOB: 1933     Date of Admission:  7/27/2018    Home clinic: [facility]  Primary care provider: Omar Olivo          Chief Complaint:   AMS    History is obtained from the electronic health record and emergency department physician          History of Present Illness:   Pt severely demented and unable to provide hx. Per ED notes  Rhianna Gamboa is a 85 year old female who has past medical history significant for Alzheimer's dementia, malignant melanoma, anemia, hypothyroidism, and significant dementia, presenting to the emergency department with concerns regarding altered mental status.  Patient was here earlier in the week, approximately 4-5 days ago after the patient had a fall, suffering laceration to the back of the head.  Staples were placed, and patient had been discharged back to [facility].  Patient was brought back to the emergency department today because of increased amounts of lethargic behavior, with increased frequent urination.  Patient with notable change in mentation level over the recent past.  No reports of fever.  Patient is unable to provide any additional history information.  History was obtained from nurse, who had discussion with EMS.           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 She was accompanied by daughter. Honoring Choices information provided  and resources reviewed. She currently wishes to give additional consideration to ACP  She currently has the following questions or concerns about Advance Care Planning: none known.  Confirmed/documented legally designated decision maker(s).  Added by Purvi Albright RN, Advance Care Planning Liaison.  Advance Care Planning 4/29/2015 Patient states she does not have an advanced directive. Copy of Honoring Choices Advanced directive given to patient for review.  Advance Care Planning 1/15/2013: Patient states has Advance Directive and will bring in a copy to clinic.        Allergic rhinitis 09/27/2012     Priority: Medium     CTS (carpal tunnel syndrome) 03/14/2012     Priority: Medium     HYPERLIPIDEMIA LDL GOAL <130 10/31/2010     Priority: Medium     Anemia 08/14/2008     Priority: Medium     Osteoporosis 03/06/2007     Priority: Medium     Problem list name updated by automated process. Provider to review       Hypothyroidism 03/07/2006     Priority: Medium     Problem list name updated by automated process. Provider to review       Cough 03/07/2006     Priority: Medium     chronic and dry for years                 Past Surgical History:      Past Surgical History:   Procedure Laterality Date     AMPUTATE FINGER(S) Right 1/20/2016    Procedure: AMPUTATE FINGER(S);  Surgeon: Brandon Rollins MD;  Location:  OR     BIOPSY NODE SENTINEL N/A 1/20/2016    Procedure: BIOPSY NODE SENTINEL;  Surgeon: Brandon Rollins MD;  Location:  OR     EYE SURGERY  7/2009    right cataract     EYE SURGERY  2010    both eyes     RELEASE CARPAL TUNNEL Right 5/12/2015    Procedure: RELEASE CARPAL TUNNEL;  Surgeon: Omar Balderrama MD;  Location: [facility] OR             Social History:     Social History     Social History     Marital status:      Spouse name: N/A     Number of children: N/A     Years of education: N/A     Occupational History     Not on file.     Social History Main Topics     Smoking status: Never Smoker      Smokeless tobacco: Never Used     Alcohol use No     Drug use: No     Sexual activity: Not Currently     Other Topics Concern     Parent/Sibling W/ Cabg, Mi Or Angioplasty Before 65f 55m? Yes     son MI in his 50s     Social History Narrative             Family History:     Family History   Problem Relation Age of Onset     C.A.D. Mother      C.A.D. Father      Cancer Maternal Grandmother      bowel cancer     C.A.D. Maternal Grandfather      Diabetes Sister      Cerebrovascular Disease Sister      HEART DISEASE Sister      HEART DISEASE Sister      HEART DISEASE Son      Breast Cancer Daughter              Allergies:     Allergies   Allergen Reactions     Codeine Other (See Comments)     Headache       Detrol [Tolterodine Tartrate] Other (See Comments)     Syncopal episode     Tylenol Itching     Other [Seasonal Allergies] Rash     Linament cream, for aches and pains and broke out where she had put it on her             Medications:     Prior to Admission medications    Medication Sig Last Dose Taking? Auth Provider   ASPIRIN 81 MG OR TABS 1 TABLET BY MOUTH DAILY 7/27/2018 at 0900 Yes Omar Olivo MD   Calcium Carb-Cholecalciferol (CALCIUM + D3) 600-200 MG-UNIT TABS Take 1 tablet by mouth 2 times daily  Patient taking differently: Take 2 tablets by mouth daily  7/27/2018 at 0900 Yes Omar Olivo MD   cetirizine (ZYRTEC) 10 MG tablet Take 10 mg by mouth daily 7/27/2018 at 0900 Yes Reported, Patient   cyanocobalamin (VITAMIN  B-12) 1000 MCG tablet Take 1 tablet (1,000 mcg) by mouth daily 7/27/2018 at 0900 Yes Omar Olivo MD   levothyroxine (SYNTHROID) 75 MCG tablet Take 1 tablet (75 mcg) by mouth daily 7/27/2018 at 0730 Yes Omar Olivo MD   Multiple Vitamin (TAB-A-MILAD) TABS TAKE 1 TABLET BY MOUTH ONCE DAILY 7/27/2018 at 0900 Yes Omar Olivo MD   pyridoxine (VITAMIN B-6) 100 MG tablet Take 1 tablet (100 mg) by mouth daily 7/27/2018 at 0900 Yes Omar Olivo MD   RANITIDINE  HCL PO Take 75 mg by mouth daily 7/27/2018 at 0900 Yes Reported, Patient   simvastatin (ZOCOR) 10 MG tablet Take 1 tablet (10 mg) by mouth At Bedtime 7/27/2018 at 1900 Yes Omar Olivo MD   amoxicillin-clavulanate (AUGMENTIN) 875-125 MG per tablet Take 1 tablet by mouth 2 times daily for 7 days   Tammy Fernandes, APRN CNP   IBUPROFEN PO Take 800 mg by mouth every 8 hours as needed for moderate pain Unknown at prn  Reported, Patient   Naproxen Sodium (ALEVE) 220 MG capsule Take 220 mg by mouth 2 times daily (with meals). PRN Unknown at prn  Reported, Patient            Review of Systems:   The Review of Systems is negative in ALL other than noted in the HPI          Physical Exam:   Blood pressure 153/72, pulse 83, temperature 98  F (36.7  C), temperature source Oral, resp. rate 18, weight 79.3 kg (174 lb 13.2 oz), SpO2 91 %, not currently breastfeeding.  GENERAL APPEARANCE: healthy, alert and no distress  EYES: conjunctiva clear, eyes grossly normal  HENT: external ears and nose normal   NECK: supple, no masses or adenopathy  RESP: lungs clear to auscultation - no rales, rhonchi or wheezes  CV: regular rate and rhythm, normal S1 S2, no S3 or S4 and no murmur, click or rub   ABDOMEN: soft, nontender, no HSM or masses and bowel sounds normal  MS: no clubbing, cyanosis; no edema  SKIN: clear without significant rashes or lesions  NEURO: Normal strength and tone, sensory exam grossly normal, mentation intact and speech normal         Data:     Lab Results   Component Value Date    WBC 14.5 (H) 07/28/2018    HGB 12.8 07/28/2018    HCT 39.9 07/28/2018     07/28/2018     07/28/2018     Lab Results   Component Value Date     07/28/2018    CO2 27 07/28/2018     Lab Results   Component Value Date    BUN 14 07/28/2018     No components found for: SEDRATE  No components found for: DDIMER  No results found for: BNP  Lab Results   Component Value Date    TSH 3.69 06/27/2018     No results found  for: TROPONIN  UA RESULTS:  Recent Labs   Lab Test  07/27/18   2315  04/24/18   1110   COLOR  Yellow  Yellow   APPEARANCE  Clear  Clear   URINEGLC  Negative  Negative   URINEBILI  Negative  Negative   URINEKETONE  Negative  Negative   SG  1.015  1.020   UBLD  Small*  Negative   URINEPH  6.0  5.5   PROTEIN  Negative  Negative   UROBILINOGEN   --   0.2   NITRITE  Negative  Positive*   LEUKEST  Moderate*  Trace*   RBCU  5*  O - 2   WBCU  8*  10-25*     Liver Function Studies -   Recent Labs   Lab Test  07/27/18   2304   PROTTOTAL  6.9   ALBUMIN  2.9*   BILITOTAL  0.8   ALKPHOS  72   AST  24   ALT  18       RADIOLOGY:    CT HEAD/ CXR-negative     A/P  ACUTE ENCEPHALOPATHY ON BASELINE DEMENTIA  Likely due to possible UTI. Has leucocytosis, positive UA-cx-p. CT head/ cxe negative.  -continue rocephin. Monitor cx.     HYPO T4  Continue meds    ALZHEIMERS DEMENTIA  Continue vit b6/b12    DISPO  Fall just last week. In [place]. Will have PT/OT/SW eval.    DVT PROF-ambulate     Ronaldo Noriega MD  [phone]

## 2018-07-28 NOTE — CONSULTS
CARE TRANSITION SOCIAL WORK INITIAL ASSESSMENT:  Reason For Consult: discharge planning   Met with: Patient and Family.    DATA  Active Problems:    Altered mental state       Primary Care Clinic Name:  ([facility])  Primary Care MD Name:  (Geovani)  Contact information and PCP information verified: Yes      ASSESSMENT  Cognitive Status: awake, alert and disoriented.       Resources List: Assisted Living     Lives With: facility resident  Living Arrangements: assisted living     Description of Support System: Supportive, Involved   Who is your support system?: Facility resident(s)/Staff   Support Assessment: Adequate family and caregiver support, Adequate social supports   Insurance Concerns: No Insurance issues identified        This writer met with pt and with pts Arelis rowan introduced self and role. Discussed discharge planning and medicare guidelines in regards to home care and SNF benefits. Pt lives at [facility] unit (phone: [phone] Fax: [phone]). Goal is for pt to return back to skilled nursing when stable as she receives total care there. They help her with transferring, toileting, feeding, dressing, medications, laundry, meals etc. Dtr is requesting that transport be arranged on dc.       PLAN    skilled nursing    Discharge Planner   Discharge Plans in progress: [facility]  Barriers to discharge plan: medical stability  Follow up plan: CTS to follow       Entered by: Jocelin Wood 07/28/2018 10:59 AM             Jocelin MERIDA, [facility], [facility] [phone]

## 2018-07-28 NOTE — ED PROVIDER NOTES
History     Chief Complaint   Patient presents with     Altered Mental Status     lethargic all day     HPI  Rhianna Gamboa is a 85 year old female who has past medical history significant for Alzheimer's dementia, malignant melanoma, anemia, hypothyroidism, and significant dementia, presenting to the emergency department with concerns regarding altered mental status.  Patient was here earlier in the week, approximately 4-5 days ago after the patient had a fall, suffering laceration to the back of the head.  Staples were placed, and patient had been discharged back to [facility] in [place].  Patient was brought back to the emergency department today because of increased amounts of lethargic behavior, with increased frequent urination.  Patient with notable change in mentation level over the recent past.  No reports of fever.  Patient is unable to provide any additional history information.  History was obtained from nurse, who had discussion with EMS.      Problem List:    Patient Active Problem List    Diagnosis Date Noted     Altered mental state 07/28/2018     Priority: Medium     Alzheimer's dementia without behavioral disturbance, unspecified timing of dementia onset 04/24/2018     Priority: Medium     Malignant melanoma of finger of right hand (H) 01/07/2016     Priority: Medium     Advance Care Planning 01/15/2013     Priority: Medium     Advance Care Planning 8/22/2016: ACP Facilitation Session:    Rhianna Gamboa presented for ACP Facilitation session at a group session. She was accompanied by daughter. Honoring Choices information provided and resources reviewed. She currently wishes to give additional consideration to ACP  She currently has the following questions or concerns about Advance Care Planning: none known.  Confirmed/documented legally designated decision maker(s).  Added by Purvi Albright RN, Advance Care Planning Liaison.  Advance Care Planning 4/29/2015 Patient states she does  not have an advanced directive. Copy of Honoring Choices Advanced directive given to patient for review.  Advance Care Planning 1/15/2013: Patient states has Advance Directive and will bring in a copy to clinic.        Allergic rhinitis 09/27/2012     Priority: Medium     CTS (carpal tunnel syndrome) 03/14/2012     Priority: Medium     HYPERLIPIDEMIA LDL GOAL <130 10/31/2010     Priority: Medium     Anemia 08/14/2008     Priority: Medium     Osteoporosis 03/06/2007     Priority: Medium     Problem list name updated by automated process. Provider to review       Hypothyroidism 03/07/2006     Priority: Medium     Problem list name updated by automated process. Provider to review       Cough 03/07/2006     Priority: Medium     chronic and dry for years          Past Medical History:    Past Medical History:   Diagnosis Date     Basal cell carcinoma      Malignant melanoma (H)        Past Surgical History:    Past Surgical History:   Procedure Laterality Date     AMPUTATE FINGER(S) Right 1/20/2016    Procedure: AMPUTATE FINGER(S);  Surgeon: Brandon Rollins MD;  Location: [facility] OR     BIOPSY NODE SENTINEL N/A 1/20/2016    Procedure: BIOPSY NODE SENTINEL;  Surgeon: Brandon Rollins MD;  Location:  OR     EYE SURGERY  7/2009    right cataract     EYE SURGERY  2010    both eyes     RELEASE CARPAL TUNNEL Right 5/12/2015    Procedure: RELEASE CARPAL TUNNEL;  Surgeon: Omar Balderrama MD;  Location: [facility] OR       Family History:    Family History   Problem Relation Age of Onset     C.A.D. Mother      C.A.D. Father      Cancer Maternal Grandmother      bowel cancer     C.A.D. Maternal Grandfather      Diabetes Sister      Cerebrovascular Disease Sister      HEART DISEASE Sister      HEART DISEASE Sister      HEART DISEASE Son      Breast Cancer Daughter        Social History:  Marital Status:   [5]  Social History   Substance Use Topics     Smoking status: Never Smoker     Smokeless tobacco: Never Used     Alcohol use No         Medications:      No current outpatient prescriptions on file.      Review of Systems   Unable to perform ROS: Dementia       Physical Exam   BP: 94/57  Pulse: 80  Heart Rate: 80  Temp: 97.4  F (36.3  C)  Resp: 20  Weight: 79.3 kg (174 lb 13.2 oz)  SpO2: 92 %      Physical Exam  /62 (BP Location: Right arm)  Pulse 83  Temp 97.8  F (36.6  C) (Oral)  Resp 18  Wt 79.3 kg (174 lb 13.2 oz)  SpO2 92%  BMI 31.47 kg/m2  General: Awake, moving extremities.  Unable to provide any history information.  Able to follow simple commands.  Head: posterior scalp staples in place.  Nose: no rhinorrhea or epistaxis  Ears: no external auditory canal discharge or bleeding.    Eyes: Sclera nonicteric. Conjunctiva noninjected. PERRL,   Mouth: no tonsillar erythema, edema, or exudate  Neck: supple, no palp LAD  Lungs: CTAB  CV: RRR, S1/S2; peripheral pulses palpable and symmetric  Abdomen: soft, nt, nd, no guarding or rebound. Positive bowel sounds  Extremities: no cyanosis or edema  Skin: no rash or diaphoresis  Neuro: Moving all extremities.      ED Course     ED Course     Procedures               Critical Care time:  none               Results for orders placed or performed during the hospital encounter of 07/27/18 (from the past 24 hour(s))   CBC with platelets differential   Result Value Ref Range    WBC 12.7 (H) 4.0 - 11.0 10e9/L    RBC Count 3.85 3.8 - 5.2 10e12/L    Hemoglobin 12.4 11.7 - 15.7 g/dL    Hematocrit 37.7 35.0 - 47.0 %    MCV 98 78 - 100 fl    MCH 32.2 26.5 - 33.0 pg    MCHC 32.9 31.5 - 36.5 g/dL    RDW 13.7 10.0 - 15.0 %    Platelet Count 218 150 - 450 10e9/L    Diff Method Automated Method     % Neutrophils 70.1 %    % Lymphocytes 13.7 %    % Monocytes 15.3 %    % Eosinophils 0.3 %    % Basophils 0.2 %    % Immature Granulocytes 0.4 %    Nucleated RBCs 0 0 /100    Absolute Neutrophil 8.9 (H) 1.6 - 8.3 10e9/L    Absolute Lymphocytes 1.7 0.8 - 5.3 10e9/L    Absolute Monocytes 1.9 (H) 0.0 - 1.3 10e9/L     Absolute Eosinophils 0.0 0.0 - 0.7 10e9/L    Absolute Basophils 0.0 0.0 - 0.2 10e9/L    Abs Immature Granulocytes 0.1 0 - 0.4 10e9/L    Absolute Nucleated RBC 0.0    Comprehensive metabolic panel   Result Value Ref Range    Sodium 136 133 - 144 mmol/L    Potassium 3.6 3.4 - 5.3 mmol/L    Chloride 101 94 - 109 mmol/L    Carbon Dioxide 26 20 - 32 mmol/L    Anion Gap 9 3 - 14 mmol/L    Glucose 116 (H) 70 - 99 mg/dL    Urea Nitrogen 17 7 - 30 mg/dL    Creatinine 0.81 0.52 - 1.04 mg/dL    GFR Estimate 67 >60 mL/min/1.7m2    GFR Estimate If Black 81 >60 mL/min/1.7m2    Calcium 8.1 (L) 8.5 - 10.1 mg/dL    Bilirubin Total 0.8 0.2 - 1.3 mg/dL    Albumin 2.9 (L) 3.4 - 5.0 g/dL    Protein Total 6.9 6.8 - 8.8 g/dL    Alkaline Phosphatase 72 40 - 150 U/L    ALT 18 0 - 50 U/L    AST 24 0 - 45 U/L   Lactic acid whole blood   Result Value Ref Range    Lactic Acid 0.9 0.7 - 2.0 mmol/L   UA reflex to Microscopic   Result Value Ref Range    Color Urine Yellow     Appearance Urine Clear     Glucose Urine Negative NEG^Negative mg/dL    Bilirubin Urine Negative NEG^Negative    Ketones Urine Negative NEG^Negative mg/dL    Specific Gravity Urine 1.015 1.003 - 1.035    Blood Urine Small (A) NEG^Negative    pH Urine 6.0 5.0 - 7.0 pH    Protein Albumin Urine Negative NEG^Negative mg/dL    Urobilinogen mg/dL 0.0 0.0 - 2.0 mg/dL    Nitrite Urine Negative NEG^Negative    Leukocyte Esterase Urine Moderate (A) NEG^Negative    Source Catheterized Urine     RBC Urine 5 (H) 0 - 2 /HPF    WBC Urine 8 (H) 0 - 5 /HPF    Bacteria Urine Few (A) NEG^Negative /HPF    Squamous Epithelial /HPF Urine 1 0 - 1 /HPF    Mucous Urine Present (A) NEG^Negative /LPF   Head CT w/o contrast    Narrative    CT HEAD WITHOUT CONTRAST  7/28/2018 12:01 AM     HISTORY: Recent fall. Increased confusion and lethargy.    COMPARISON: 7/23/2018.    TECHNIQUE: Without intravenous contrast, helical sections were  acquired through the brain. Coronal reconstructions were  generated.  Radiation dose for this scan was reduced using automated exposure  control, adjustment of the mA and/or kV according to the patient's  size, or iterative reconstruction technique.    FINDINGS: Moderate diffuse cerebral atrophy. Moderate periventricular  white matter low attenuation, likely relating to chronic small vessel  ischemic disease. No intra-axial mass, mass effect or midline shift.  Normal gray-white matter differentiation. No visualized acute  intra-axial hemorrhage. The cerebral ventricles are normal in caliber.  The basal cisterns are patent. No extra-axial fluid collection. The  visualized portions of the paranasal sinuses and mastoid air cells are  unremarkable. A few skin staples are present in the right posterior  parietal scalp.      Impression    IMPRESSION: No evidence of acute intracranial abnormality.    ANNY REES MD   XR Chest Port 1 View    Narrative    CHEST SINGLE VIEW PORTABLE  7/28/2018 12:27 AM     HISTORY: Cough.    COMPARISON: 7/23/2018.    FINDINGS: Hypoinflated lungs. The lungs are clear. The size of the  cardiac silhouette is within normal limits. Atherosclerotic  calcification in the thoracic aorta.      Impression    IMPRESSION: No convincing evidence of active cardiopulmonary disease.    ANNY REES MD       Medications   0.9% sodium chloride BOLUS (1,000 mLs Intravenous New Bag 7/27/18 8067)     Followed by   sodium chloride 0.9% infusion (0 mLs Intravenous ED Infusing on Admission/transfer 7/28/18 1785)   naloxone (NARCAN) injection 0.1-0.4 mg (not administered)   ibuprofen (ADVIL/MOTRIN) tablet 600 mg (not administered)   ondansetron (ZOFRAN-ODT) ODT tab 4 mg (not administered)     Or   ondansetron (ZOFRAN) injection 4 mg (not administered)   prochlorperazine (COMPAZINE) injection 5 mg (not administered)     Or   prochlorperazine (COMPAZINE) tablet 5 mg (not administered)     Or   prochlorperazine (COMPAZINE) Suppository 12.5 mg (not administered)    cefTRIAXone in d5w (ROCEPHIN) intermittent infusion 1 g (not administered)   cefTRIAXone in d5w (ROCEPHIN) intermittent infusion 1 g (0 g Intravenous Stopped 7/28/18 5640)       Assessments & Plan (with Medical Decision Making)  85 year old female, presenting to the emergency department with increased amounts of lethargy, in addition to decreased activity level at her care facility during the day today.  She does have recent visit to the emergency department 4 days ago after the patient had a fall.  Therefore, concern is for potential of delayed bleed, or other acute intracranial cause of patient's mentation change.  Also, concern remains for infectious, or metabolic cause of patient's mentation changes.  Patient arrives afebrile, laboratory workup is performed and does have slight elevation of white blood cell count at 12.7.  CMP is unremarkable.  Chest x-ray performed that shows no evidence of pneumonia.  Catheterized urine specimen does have 8 white blood cells, with leukocyte esterase and a few bacteria.  Therefore, potential for UTI as a cause of patient's mentation level changes.  CT scan of the head showed no acute findings.  Therefore, at this point, working diagnosis is bladder infection as the cause of patient's mentation changes.  Rocephin was administered.  Urine culture pending.  Discussed with hospitalist, and patient admitted for further management.     I have reviewed the nursing notes.    I have reviewed the findings, diagnosis, plan and need for follow up with the patient.       Current Discharge Medication List          Final diagnoses:   Acute cystitis without hematuria   Alzheimer's dementia without behavioral disturbance, unspecified timing of dementia onset   Altered mental status, unspecified altered mental status type       7/27/2018   [facility] SURGICAL     Jacoby Elliott MD  07/28/18 3607

## 2018-07-28 NOTE — PROGRESS NOTES
Spoke to attendant at [facility] and nurse will be available at 1000 and to call back at that time to complete admit assessment process.

## 2018-07-28 NOTE — ED NOTES
Pt is resident of [facility]. Pt arrived to ER via EMS. Pt had a fall on Monday and was seen here in ER for laceration on the back of her head. EMS was called as pt has been lethargic all day with frequent urination. Pt has strong hand grasps bilat, is not following directions as well tonight as she was on Monday when this RN took care of her. Pt denies HA but seems more comfortable with the lights off, she closes her eyes tightly when the overhead lights are on. Pt straight cathed for dark yellow urine, urine was not foul smelling. Blood glucose per EMS was 129.

## 2018-07-29 NOTE — PROGRESS NOTES
Discharge Planner PT   Patient plan for discharge: Memory Care  Current status: Transfers sit <> stand with CGA.  Pt amb 25' with RW with mod A of 2 for steering and to keep the RW close as pt tends to push it 3 feet in front of her.  Barriers to return to prior living situation: impaired gait  Recommendations for discharge: Memory Care with PT for strengthening and gait training  Rationale for recommendations: Pt would benefit from continued PT for strengthening and gait training for increased safety and endurance.       Entered by: Stacie Fontana 07/29/2018 1:30 PM     Physical Therapy Evaluation       07/29/18 1320   Living Environment   Lives With facility resident   Living Arrangements assisted living   Home Accessibility no concerns   Living Environment Comment Pt lives at [facility]   Functional Level Prior   Prior Functional Level Comment PLOF unclear as Pt is not a reliable historian. Per chart, has assist with all ADLs/IADLs at baseline. Ambulates with walker.   General Information   Onset of Illness/Injury or Date of Surgery - Date 07/27/18   Referring Physician Dr Noriega   Patient/Family Goals Statement unable to verbalize a goal   Pertinent History of Current Problem (include personal factors and/or comorbidities that impact the POC) Per H&P:  Rhianna Gamboa is a 85 year old female who has past medical history significant for Alzheimer's dementia, malignant melanoma, anemia, hypothyroidism, and significant dementia, presenting to the emergency department with concerns regarding altered mental status.  Patient was here earlier in the week, approximately 4-5 days ago after the patient had a fall, suffering laceration to the back of the head.  Staples were placed, and patient had been discharged back to [facility] in [place].  Patient was brought back to the emergency department today because of increased amounts of lethargic behavior, with increased frequent urination.  Patient with notable  "change in mentation level over the recent past.  No reports of fever.    Cognitive Status Examination   Orientation person;place   Level of Consciousness alert   Follows Commands and Answers Questions 50% of the time   Personal Safety and Judgment at risk behaviors demonstrated   Memory impaired   Posture    Posture Forward head position;Protracted shoulders;Kyphosis   Range of Motion (ROM)   ROM Comment WFL for age   Strength   Strength Comments generally 4+/5 throughout   Transfer Skills   Transfer Comments sit <> stand with CGA of 2   Gait   Gait Comments Pt amb 25' with RW with mod A of 2 for steering and to keep the RW close as pt tends to push it 3 feet in front of her.   Balance   Balance Comments good static standing balance, fair to good dynamic standing balance   General Therapy Interventions   Planned Therapy Interventions gait training;strengthening   Clinical Impression   Criteria for Skilled Therapeutic Intervention yes, treatment indicated   PT Diagnosis decreased strength and endurance   Influenced by the following impairments decreased strength and endurance   Functional limitations due to impairments mobility and gait   Clinical Presentation Stable/Uncomplicated   Clinical Presentation Rationale clinical judgement   Clinical Decision Making (Complexity) Low complexity   Therapy Frequency` daily   Predicted Duration of Therapy Intervention (days/wks) 3 days   Anticipated Discharge Disposition (Memory Care)   Risk & Benefits of therapy have been explained Yes   Patient, Family & other staff in agreement with plan of care Yes   [facility] [employer] TM \"6 Clicks\"   2016, Trustees of [facility], under license to [employer].  All rights reserved.   6 Clicks Short Forms Basic Mobility Inpatient Short Form   [facility] [employer]  \"6 Clicks\" V.2 Basic Mobility Inpatient Short Form   1. Turning from your back to your side while in a flat bed without using bedrails? 2 - A Lot   2. Moving from " lying on your back to sitting on the side of a flat bed without using bedrails? 2 - A Lot   3. Moving to and from a bed to a chair (including a wheelchair)? 2 - A Lot   4. Standing up from a chair using your arms (e.g., wheelchair, or bedside chair)? 3 - A Little   5. To walk in hospital room? 2 - A Lot   6. Climbing 3-5 steps with a railing? 1 - Total   Basic Mobility Raw Score (Score out of 24.Lower scores equate to lower levels of function) 12   Total Evaluation Time   Total Evaluation Time (Minutes) 15     See Care Plan for Goals.    Stacie Fontana PT

## 2018-07-29 NOTE — PLAN OF CARE
Problem: Patient Care Overview  Goal: Plan of Care/Patient Progress Review  Pt afebrile. Taking ibuprofen as needed for back & left hip pain. Sleeping well tonight. Repositioned every 2hrs & incont of large amts of urine each time.

## 2018-07-29 NOTE — PROGRESS NOTES
[facility] Internal Medicine Progress Note     Date of Service (when I saw the patient): 07/29/2018    REASON FOR ADMISSION / INTERVAL HISTORY:  AMS. Pt has dementia. See details below.    ASSESSMENT/PLAN:   ACUTE ENCEPHALOPATHY ON BASELINE DEMENTIA  Likely due to possible UTI. Has leucocytosis, mildly  positive UA-cx-p. CT head/ cxr negative. Improved significantly today  -continue rocephin. Monitor cx. F/u  Wbc in AM     HYPO T4  Continue meds     ALZHEIMERS DEMENTIA  Continue vit b6/b12     DISPO  Fall just last week. In AL. Will have PT/OT/SW eval.      TREMAINE LEO MD   Pg [phone]    DVT Prhylaxis: Low Risk/Ambulatory with no VTE prophylaxis indicated  Code Status: DNR/DNI    ROS:  As described in A/P and Exam.  Otherwise ALL are  negative.    PHYSICAL EXAM:  All vitals have been reviewed    Blood pressure 123/68, pulse 94, temperature 97.9  F (36.6  C), temperature source Oral, resp. rate 16, weight 79.3 kg (174 lb 13.2 oz), SpO2 93 %, not currently breastfeeding.    I/O this shift:  In: 480 [P.O.:480]  Out: -     GENERAL APPEARANCE: healthy, alert and no distress  EYES: conjunctiva clear, eyes grossly normal  HENT: external ears and nose normal   NECK: supple, no masses or adenopathy  RESP: lungs clear to auscultation - no rales, rhonchi or wheezes  CV: regular rate and rhythm, normal S1 S2, no S3 or S4 and no murmur, click or rub   ABDOMEN: soft, nontender, no HSM or masses and bowel sounds normal  MS: no clubbing, cyanosis; no edema  SKIN: clear without significant rashes or lesions  NEURO: -non-focal moves all 4 extr    ROUTINE  LABS (Last four results)  CMP  Recent Labs  Lab 07/28/18  0615 07/27/18  2304 07/23/18  2055    136 136   POTASSIUM 3.6 3.6 3.8   CHLORIDE 107 101 103   CO2 27 26 29   ANIONGAP 5 9 4   * 116* 104*   BUN 14 17 16   CR 0.71 0.81 0.78   GFRESTIMATED 78 67 70   GFRESTBLACK >90 81 85   TANG 8.7 8.1* 8.6   PROTTOTAL  --  6.9  --    ALBUMIN  --  2.9*  --     BILITOTAL  --  0.8  --    ALKPHOS  --  72  --    AST  --  24  --    ALT  --  18  --      CBC  Recent Labs  Lab 07/28/18  0615 07/27/18  2304 07/23/18  2055   WBC 14.5* 12.7* 8.7   RBC 4.00 3.85 4.10   HGB 12.8 12.4 13.4   HCT 39.9 37.7 41.2    98 101*   MCH 32.0 32.2 32.7   MCHC 32.1 32.9 32.5   RDW 13.5 13.7 13.5    218 240     INRNo lab results found in last 7 days.  Arterial Blood GasNo lab results found in last 7 days.    No results found for this or any previous visit (from the past 24 hour(s)).

## 2018-07-29 NOTE — PROGRESS NOTES
07/29/18 1300   Quick Adds   Type of Visit Initial Occupational Therapy Evaluation   Living Environment   Lives With facility resident   Living Arrangements assisted living   Home Accessibility no concerns   Living Environment Comment Pt lives at [facility]   Functional Level Prior   Prior Functional Level Comment PLOF unclear as Pt is not a reliable historian. Per chart, has assist with all ADLs/IADLs at baseline. Ambulates with walker.   General Information   Onset of Illness/Injury or Date of Surgery - Date 07/27/18   Referring Physician Dr. Noriega   Patient/Family Goals Statement none stated   Additional Occupational Profile Info/Pertinent History of Current Problem ACUTE ENCEPHALOPATHY ON BASELINE DEMENTIA, likely d/t UTI (per chart)   Precautions/Limitations fall precautions   General Observations pleasantly confused   Cognitive Status Examination   Orientation person;place   Level of Consciousness alert   Able to Follow Commands moderate impairment   Personal Safety (Cognitive) severe impairment   Cognitive Comment advanced dementia    Pain Assessment   Patient Currently in Pain No   Range of Motion (ROM)   ROM Quick Adds No deficits were identified   Strength   Strength Comments BUE grossly 4-/5   Transfer Skill: Sit to Stand   Level of [place]: Sit/Stand contact guard   Physical Assist/Nonphysical Assist: Sit/Stand 2 persons   Transfer Skill: Sit to Stand full weight-bearing   Assistive Device for Transfer: Sit/Stand rolling walker   Transfer Skill: Toilet Transfer   Level of [place]: Toilet moderate assist (50% patients effort)   Physical Assist/Nonphysical Assist: Toilet 2 persons   Weight-Bearing Restrictions: Toilet full weight-bearing   Assistive Device rolling walker;grab bars   Toilet Transfer Skill Comments extra time, and use of B grab bars. Needing Ax2 for safety   Lower Body Dressing   Level of [place]: Dress Lower Body maximum assist (25% patients effort)   Physical  "Assist/Nonphysical Assist: Dress Lower Body 1 person assist   Toileting   Level of [place]: Toilet stand-by assist   Physical Assist/Nonphysical Assist: Toilet set-up required;supervision;verbal cues   Grooming   Level of [place]: Grooming stand-by assist   Physical Assist/Nonphysical Assist: Grooming set-up required;supervision;verbal cues   Instrumental Activities of Daily Living (IADL)   IADL Comments relies on snf   Activities of Daily Living Analysis   Impairments Contributing to Impaired Activities of Daily Living balance impaired;cognition impaired;strength decreased   Clinical Impression   Criteria for Skilled Therapeutic Interventions Met yes, treatment indicated   OT Diagnosis decreased functional ind, and impaired safety with mobility   Influenced by the following impairments generalized weakness d/t UTI   Assessment of Occupational Performance 1-3 Performance Deficits   Identified Performance Deficits safety during mobility, strength for mobility/functional transfers   Clinical Decision Making (Complexity) Low complexity   Therapy Frequency 3 times/wk   Predicted Duration of Therapy Intervention (days/wks) 2-3 days   Anticipated Discharge Disposition Long Term Care Facility   Risks and Benefits of Treatment have been explained. Yes   Patient, Family & other staff in agreement with plan of care Yes   Clinical Impression Comments Pt will benefit from ongoing OT to increase strength and endurance in order to maximize safety with ADLs/mobility.    [facility] [employer] TM \"6 Clicks\"   2016, Trustees of [facility], under license to [employer].  All rights reserved.   6 Clicks Short Forms Daily Activity Inpatient Short Form   [facility] AM-PAC  \"6 Clicks\" Daily Activity Inpatient Short Form   1. Putting on and taking off regular lower body clothing? 2 - A Lot   2. Bathing (including washing, rinsing, drying)? 2 - A Lot   3. Toileting, which includes using toilet, bedpan or urinal? " 3 - A Little   4. Putting on and taking off regular upper body clothing? 3 - A Little   5. Taking care of personal grooming such as brushing teeth? 3 - A Little   6. Eating meals? 4 - None   Daily Activity Raw Score (Score out of 24.Lower scores equate to lower levels of function) 17   Total Evaluation Time   Total Evaluation Time (Minutes) 15     See care plan for goals    Mariza Stauffer OTR/L

## 2018-07-29 NOTE — PLAN OF CARE
Problem: Patient Care Overview  Goal: Plan of Care/Patient Progress Review  Discharge Planner OT   Patient plan for discharge: back to [facility], [facility]  Current status: Currently requires Ax2 for mobility d/t safety concerns. Pt pushing walker too far out, and dragging R foot during ambulation. Needing physical assist to manage walker, and demonstrating difficulty following commands during functional transfers. Needs mod Ax2 for ambulation around room with 2ww, mod Ax2 for toilet tx. She is incontinent in depends, but able to complete kathy cares with set up A. Completes simple grooming task with set up A and verbal cues. Needing Max A for LE dressing.   Barriers to return to prior living situation: medical status  Recommendations for discharge: back to [facility], with ongoing OT  Rationale for recommendations: Pt would benefit from ongoing therapy after dc to increase strength and safety with mobility, in order to decrease risk of falls.        Entered by: Mariza Moreno 07/29/2018 1:23 PM

## 2018-07-29 NOTE — PLAN OF CARE
Problem: Patient Care Overview  Goal: Plan of Care/Patient Progress Review  Pt up to BSC with walker & 2 assist this morning. Was able to follow directions & take several steps to commode. Slow to move, needs reminders to keep walker close.

## 2018-07-29 NOTE — PLAN OF CARE
Problem: Patient Care Overview  Goal: Plan of Care/Patient Progress Review  Pt sat up in chair most of evening & tolerated well. Used EZ stand to get back into bed. Pt stated had pain in back & left hip. Ibuprofen given & pt able to rest/sleep. incont of large amts urine. Repositioned in bed. Alert to self only.

## 2018-07-30 NOTE — DISCHARGE SUMMARY
[facility] Hospitalist Discharge Summary    Rhianna Gamboa MRN# [id number]   Age: 85 year old YOB: 1933     Date of Admission:  7/27/2018  Date of Discharge::  7/30/2018  Admitting Physician:  Grayson Cisneros MD  Discharge Physician:  Bella Hunter DO  Primary Physician: Omar Olivo       Home clinic: [facility]          Admission Diagnoses:   Acute cystitis without hematuria [N30.00]  Altered mental status, unspecified altered mental status type [R41.82]  Alzheimer's dementia without behavioral disturbance, unspecified timing of dementia onset [G30.9, F02.80]          Discharge Diagnosis:   Principle diagnosis: acute cystitis w/out hematuria  Secondary diagnoses:  Altered mental status, unspecified altered mental status type [R41.82]  Alzheimer's dementia without behavioral disturbance, unspecified timing of dementia onset [G30.9, F02.80]          Procedures:     None            Allergies:      Allergies   Allergen Reactions     Codeine Other (See Comments)     Headache       Detrol [Tolterodine Tartrate] Other (See Comments)     Syncopal episode     Tylenol Itching     Other [Seasonal Allergies] Rash     Linament cream, for aches and pains and broke out where she had put it on her              Discharge Medications:       Current Discharge Medication List      START taking these medications    Details   cephALEXin (KEFLEX) 500 MG capsule Take 1 capsule (500 mg) by mouth 2 times daily for 3 days  Qty: 6 capsule, Refills: 0    Associated Diagnoses: Acute cystitis without hematuria         CONTINUE these medications which have NOT CHANGED    Details   ASPIRIN 81 MG OR TABS 1 TABLET BY MOUTH DAILY  Qty: 100, Refills: 3    Associated Diagnoses: Other and unspecified hyperlipidemia      Calcium Carb-Cholecalciferol (CALCIUM + D3) 600-200 MG-UNIT TABS Take 1 tablet by mouth 2 times daily  Qty: 62 tablet, Refills: 11    Associated Diagnoses: Osteoporosis, unspecified  "osteoporosis type, unspecified pathological fracture presence      cetirizine (ZYRTEC) 10 MG tablet Take 10 mg by mouth daily      cyanocobalamin (VITAMIN  B-12) 1000 MCG tablet Take 1 tablet (1,000 mcg) by mouth daily  Qty: 31 tablet, Refills: 11    Associated Diagnoses: Alzheimer's dementia without behavioral disturbance, unspecified timing of dementia onset      levothyroxine (SYNTHROID) 75 MCG tablet Take 1 tablet (75 mcg) by mouth daily  Qty: 31 tablet, Refills: 11    Associated Diagnoses: Hypothyroidism, unspecified type      Multiple Vitamin (TAB-A-MILAD) TABS TAKE 1 TABLET BY MOUTH ONCE DAILY  Qty: 31 tablet, Refills: 11    Comments: Please authorize qty 31 with prn refills for assisting living patient. thanks  Associated Diagnoses: Routine general medical examination at a health care facility      pyridoxine (VITAMIN B-6) 100 MG tablet Take 1 tablet (100 mg) by mouth daily  Qty: 31 tablet, Refills: 11    Associated Diagnoses: Alzheimer's dementia without behavioral disturbance, unspecified timing of dementia onset      RANITIDINE HCL PO Take 75 mg by mouth daily      simvastatin (ZOCOR) 10 MG tablet Take 1 tablet (10 mg) by mouth At Bedtime  Qty: 31 tablet, Refills: 11    Associated Diagnoses: Hyperlipidemia LDL goal <130      IBUPROFEN PO Take 800 mg by mouth every 8 hours as needed for moderate pain      Naproxen Sodium (ALEVE) 220 MG capsule Take 220 mg by mouth 2 times daily (with meals). PRN         STOP taking these medications       amoxicillin-clavulanate (AUGMENTIN) 875-125 MG per tablet Comments:   Reason for Stopping:                     Consultations:   PT/OT/social work          Brief History of Presenting Illness:   \"Pt severely demented and unable to provide hx. Per ED notes  Rhianna Gamboa is a 85 year old female who has past medical history significant for Alzheimer's dementia, malignant melanoma, anemia, hypothyroidism, and significant dementia, presenting to the emergency department with " "concerns regarding altered mental status.  Patient was here earlier in the week, approximately 4-5 days ago after the patient had a fall, suffering laceration to the back of the head.  Staples were placed, and patient had been discharged back to [facility] in [place].  Patient was brought back to the emergency department today because of increased amounts of lethargic behavior, with increased frequent urination.  Patient with notable change in mentation level over the recent past.  No reports of fever.  Patient is unable to provide any additional history information.  History was obtained from nurse, who had discussion with EMS.\"          Hospital Course:   Acute encephalopathy on chronic baseline dementia: Likely due to probable urinary tract infection.  Was admitted with leukocytosis, mildly positive UA, although culture was negative.  Workup for other causes was unrevealing.  Improved with IV ceftriaxone.  White blood cell count trending down and mental status close to baseline  --Discharge with Keflex 500 twice daily to complete a total of 7 days of antibiotics    Hypothyroidism: Chronic and stable, discharged on prior to admission levothyroxine    Alzheimer's dementia: Continue prior to admission vitamin B6 and B12    Left hip pain: Had a fall at her assisted living last week.  Needing several doses of Tylenol this admission for complaints of hip and back pain.  X-ray done prior to admission does not show acute fracture.  Okay to resume prior to admission NSAIDs and Tylenol as needed for pain    # Discharge Pain Plan:   - During her hospitalization, Rhianna experienced pain due to left hip pain.  She is unable to participate in a plan for discharge pain management; however, the plan was discussed in a collaborative fashion with her senior care .   - Pharmacologic adjuvants:  NSAIDs and Acetaminophen               Discharge Exam:   Blood pressure 161/80, pulse 77, temperature 97.5  F (36.4  C), temperature " source Axillary, resp. rate 18, weight 79.3 kg (174 lb 13.2 oz), SpO2 94 %, not currently breastfeeding.    Gen: alert, in no distress, elderly, frail  CV: RRR, S1 and S2 normal, no murmurs. Radial and pedal pulses symmetric and normal  Respiratory: Lungs clear bilaterally  Abd: Soft, non-tender.  Normal bowel sounds.  No hepatosplenomegaly   MSK:  extremities normal- no gross deformities noted, normal muscle tone no pain with palpation along the spine or either hip.. Normal range of motion of bilateral legs  Skin: no suspicious lesions or rashes  Lymph: No peripheral edema           Pending/Follow-Up Tests at Discharge:   None          Discharge Instructions and Follow-Up:   Discharge diet: Regular   Discharge activity: Activity as tolerated   Discharge follow-up: Follow with primary care provider in 7 days           Discharge Disposition:   Discharged to assisted living      Attestation:  Amount of time performed on this discharge : 20 minutes.    Bella Hunter DO

## 2018-07-30 NOTE — PROGRESS NOTES
Clinic Care Coordination Contact  Care Coordination Transition Communication    Referral Source: IP Handoff    Clinical Data: [place] [facility] [place] with diagnosis of acute cystitis without hematuria.     Transition to Facility:              Facility Name: Pt to return to [facility]--Memory Care Unit with heavy assist in all cares.  Patient has 24/7 staffing.              Contact name and phone number/fax: phone: [phone] Fax: [phone]    Plan:  Care Coordination will not open patient to services as pt resides in a Memory Care community where she receives 24 hour services.    Sushma Riley  Social Work Care Coordinator  [facility]  [phone]

## 2018-07-30 NOTE — PLAN OF CARE
Problem: Patient Care Overview  Goal: Plan of Care/Patient Progress Review  Discharge Planner OT   Patient plan for discharge: Pt unable to state. [facility] has assist with all ADLs and functional mobiltiy per chart review    Current status: Mod A for supine to sit. Min A to scoot towards edge of bed. Min Ax2 for sit to stand and Min Ax2 using FWW to ambulate to/from sink. Needs physical assist guiding walker and consistent VC's to maintain attention on task and body mechanics.     Barriers to return to prior living situation: generalized weakness    Recommendations for discharge: [facility]    Rationale for recommendations: would benefit from continue therapy to increase strength/activity tolerance for ADLs and functional mobility     Occupational Therapy Discharge Summary    Reason for therapy discharge:    [facility]    Progress towards therapy goal(s). See goals on Care Plan in [facility] electronic health record for goal details.  Goals partially met.  Barriers to achieving goals:   discharge from facility.    Therapy recommendation(s):    Continued therapy is recommended.  Rationale/Recommendations:  Home OT.           Entered by: Nikole Donald 07/30/2018 10:54 AM

## 2018-07-30 NOTE — PLAN OF CARE
Problem: Urinary Tract Infection (Adult)  Goal: Signs and Symptoms of Listed Potential Problems Will be Absent, Minimized or Managed (Urinary Tract Infection)  Signs and symptoms of listed potential problems will be absent, minimized or managed by discharge/transition of care (reference Urinary Tract Infection (Adult) CPG).   Outcome: Adequate for Discharge Date Met: 07/30/18  Remains incontinent of good amounts of urine. No foul/smelly odor noted. Pt alert and cooperative, talkative and smiles a lot. Afebrile and VSS. Report given to and discharge instructions reviewed with  Hui Vu via telephone.

## 2018-07-30 NOTE — PHARMACY - DISCHARGE MEDICATION RECONCILIATION
Discharge medication review for this patient is complete. Pharmacist assisted with medication reconciliation of discharge medications with prior to admission medications.     The following changes were made to the discharge medication list based on pharmacist review:  Added:  keflex  Discontinued: augmentin  Changed:  Avoid using naproxen and ibuprofen concurrently, as both are same class NSAIDs and using both increases the risk of bleeding.      Patient's Discharge Medication List  - medications as listed on After Visit Summary (AVS)     Review of your medicines      START taking       Dose / Directions    cephALEXin 500 MG capsule   Commonly known as:  KEFLEX   Used for:  Acute cystitis without hematuria        Dose:  500 mg   Take 1 capsule (500 mg) by mouth 2 times daily   Quantity:  6 capsule   Refills:  0         CONTINUE these medicines which may have CHANGED, or have new prescriptions. If we are uncertain of the size of tablets/capsules you have at home, strength may be listed as something that might have changed.       Dose / Directions    Calcium + D3 600-200 MG-UNIT Tabs   This may have changed:    - how much to take  - when to take this   Used for:  Osteoporosis, unspecified osteoporosis type, unspecified pathological fracture presence        Dose:  1 tablet   Take 1 tablet by mouth 2 times daily   Quantity:  62 tablet   Refills:  11         CONTINUE these medicines which have NOT CHANGED       Dose / Directions    ALEVE 220 MG capsule   Generic drug:  naproxen sodium        Dose:  220 mg   Take 220 mg by mouth 2 times daily (with meals). PRN   Refills:  0       aspirin 81 MG tablet   Used for:  Other and unspecified hyperlipidemia        1 TABLET BY MOUTH DAILY   Quantity:  100   Refills:  3       cetirizine 10 MG tablet   Commonly known as:  zyrTEC        Dose:  10 mg   Take 10 mg by mouth daily   Refills:  0       cyanocobalamin 1000 MCG tablet   Commonly known as:  vitamin  B-12   Used for:   Alzheimer's dementia without behavioral disturbance, unspecified timing of dementia onset        Dose:  1000 mcg   Take 1 tablet (1,000 mcg) by mouth daily   Quantity:  31 tablet   Refills:  11       IBUPROFEN PO        Dose:  800 mg   Take 800 mg by mouth every 8 hours as needed for moderate pain   Refills:  0       levothyroxine 75 MCG tablet   Commonly known as:  SYNTHROID   Used for:  Hypothyroidism, unspecified type        Dose:  75 mcg   Take 1 tablet (75 mcg) by mouth daily   Quantity:  31 tablet   Refills:  11       pyridoxine 100 MG tablet   Commonly known as:  VITAMIN B-6   Used for:  Alzheimer's dementia without behavioral disturbance, unspecified timing of dementia onset        Dose:  100 mg   Take 1 tablet (100 mg) by mouth daily   Quantity:  31 tablet   Refills:  11       RANITIDINE HCL PO        Dose:  75 mg   Take 75 mg by mouth daily   Refills:  0       simvastatin 10 MG tablet   Commonly known as:  ZOCOR   Used for:  Hyperlipidemia LDL goal <130        Dose:  10 mg   Take 1 tablet (10 mg) by mouth At Bedtime   Quantity:  31 tablet   Refills:  11       TAB-A-MILAD Tabs   Used for:  Routine general medical examination at a health care facility        TAKE 1 TABLET BY MOUTH ONCE DAILY   Quantity:  31 tablet   Refills:  11         STOP taking          amoxicillin-clavulanate 875-125 MG per tablet   Commonly known as:  AUGMENTIN                Where to get your medicines      Some of these will need a paper prescription and others can be bought over the counter. Ask your nurse if you have questions.     You don't need a prescription for these medications      cephALEXin 500 MG capsule

## 2018-07-30 NOTE — DISCHARGE INSTRUCTIONS
Avoid using naproxen and ibuprofen concurrently, as both are same class NSAIDs and using both increases the risk of bleeding.

## 2018-07-30 NOTE — PROGRESS NOTES
[facility] DISCHARGE NOTE    Patient discharged to nursing home at 1400 PM via wheel chair. Accompanied by other:[facility] transport and staff. Discharge instructions reviewed with patient and [facility] nurse, opportunity offered to ask questions. Prescriptions sent with patient to fill . All belongings sent with patient.    Patito Ewing

## 2018-07-30 NOTE — PLAN OF CARE
Problem: Patient Care Overview  Goal: Plan of Care/Patient Progress Review  Pt afebrile. incont of large amts urine. Ibuprofen given x1 for pain in left shoulder/hip.

## 2018-08-02 NOTE — TELEPHONE ENCOUNTER
I spoke with daughter, Arelis.  Pt is residing in memory care at [facility].  Arelis and her brother, Ruben, are asking for a phone consultation with Dr Olivo to discuss the followin.  Arelis and the nurse at [facility] discussed that pt appears to be in a lot of pain that is not being adequately controlled with ibuprofen alone, and they ask if pt would benefit from something more scheduled than prn ibuprofen?  2.  Pt's mobility seems to be steadily worsening.  It is becoming increasingly difficult to bring her in to clinic for appts.  [facility] is available to offer primary care to pt at [place] but Arelis  and her brother would like discuss transfer of care with Dr Olivo.  Arelis is asking if she and her brother can do a phone visit with  to discuss these items?  3.  Pt was admitted to hospital on 18 for head laceration and altered mental status.  She was discharged from the hospital on 18 for treatment of acute cystitis.  Pt has a follow up appt scheduled on 18.    Routed to  for consideration of above.    Yadira Hoffmann, RN

## 2018-08-02 NOTE — TELEPHONE ENCOUNTER
Reason for Call:  Other patient request    Detailed comments: pt's daughter calling wanting a phone consultation with Dr Olivo along with her brother to discuss pt who is in the memory care unit and fell last Thursday. She doesn't feel the pain medication is working for her per the nurse at the unit.    Phone Number Patient can be reached at: Cell number on file:  [phone]         Best Time: any    Can we leave a detailed message on this number? YES    Call taken on 8/2/2018 at 8:23 AM by Nhung Winters

## 2018-08-03 NOTE — TELEPHONE ENCOUNTER
CSS-ok to deliver message below from Dr. Olivo  Left message for patient to return call to clinic.   Liza Portillo RN

## 2018-08-03 NOTE — TELEPHONE ENCOUNTER
[facility] Orders were signed, faxed and sent to scanning.    [facility]

## 2018-08-03 NOTE — TELEPHONE ENCOUNTER
There are a lot of questions and concern about pain.  I would like to see patient and family members in a 40 minute office visit.  Omar Olivo MD  Family Medicine

## 2018-08-06 NOTE — PATIENT INSTRUCTIONS
Please get the hospice consult.    6 staples were removed from your head.          Thank you for choosing [facility].  You may be receiving a survey in the mail from [employer] regarding your visit today.  Please take a few minutes to complete and return the survey to let us know how we are doing.      If you have questions or concerns, please contact us via [employer] or you can contact your care team at [phone].    Our Clinic hours are:  Monday 6:40 am  to 7:00 pm  Tuesday -Friday 6:40 am to 5:00 pm    The [place] outpatient lab hours are:  Monday - Friday 6:10 am to 4:45 pm  Saturdays 7:00 am to 11:00 am  Appointments are required, call [phone]    If you have clinical questions after hours or would like to schedule an appointment,  call the clinic at [phone].

## 2018-08-06 NOTE — PROGRESS NOTES
SUBJECTIVE:   Rhianna Gamboa is a 85 year old female who presents to clinic today for the following health issues:  Chief Complaint   Patient presents with     Hospital F/U     needs staples removed from head laceratin     Orders     discuss the whole Hospice care delivery, do they have to change doctors etc.     Pain     schedule her pain meds in a scheduled manner rather than PRN     Edema     concerns regarding water weight.         Hospital Follow-up Visit:    Hospital/Nursing Home/IP Rehab Facility: [facility]  Date of Admission: 7/27/2018  Date of Discharge: 7/30/2018  Reason(s) for Admission: cystitis            Problems taking medications regularly:  None       Medication changes since discharge: None       Problems adhering to non-medication therapy:  None    Summary of hospitalization:  [facility] discharge summary reviewed  Diagnostic Tests/Treatments reviewed.  Follow up needed: patient over all is declining in the assisted living.  Family concerns, discuss hospice today  Other Healthcare Providers Involved in Patient s Care:         None  Update since discharge: stable.     Post Discharge Medication Reconciliation: discharge medications reconciled and changed, per note/orders (see AVS).  Plan of care communicated with patient and family     Coding guidelines for this visit:  Type of Medical   Decision Making Face-to-Face Visit       within 7 Days of discharge Face-to-Face Visit        within 14 days of discharge   Moderate Complexity [id number]   High Complexity [id number]                  Problem list and histories reviewed & adjusted, as indicated.  Additional history: as documented        Reviewed and updated as needed this visit by clinical staff  Allergies  Meds       Reviewed and updated as needed this visit by Provider         ROS: (per family)  CONSTITUTIONAL:NEGATIVE for fever, chills, change in weight  INTEGUMENTARY/SKIN: needs to have 6 stables removed  RESP:NEGATIVE  "for significant cough or SOB  CV: NEGATIVE for chest pain, palpitations or peripheral edema  MUSCULOSKELETAL: NEGATIVE for significant arthralgias or myalgia  NEURO: A and O times 2  PSYCHIATRIC: seems to be pleasant per family    OBJECTIVE:                                                    /70 (Cuff Size: Adult Large)  Pulse 72  Temp 98.3  F (36.8  C) (Tympanic)  Ht 5' 2.5\" (1.588 m)  Wt 168 lb (76.2 kg)  BMI 30.24 kg/m2  Body mass index is 30.24 kg/(m^2).  GENERAL APPEARANCE: alert and no distress  RESP: lungs clear to auscultation - no rales, rhonchi or wheezes  CV: regular rates and rhythm, normal S1 S2, no S3 or S4 and no murmur, click or rub  MS: extremities normal- no gross deformities noted  SKIN: removed 6 staples on right side of head.  NEURO: A and O to self and place, year is 1960s, Christmas is coming up, it is winter, unknown president. Unknown date and month.  Laughs when she does not know an answer  PSYCH: pleasant         ASSESSMENT/PLAN:                                                    (G30.9,  F02.80) Alzheimer's dementia without behavioral disturbance, unspecified timing of dementia onset  (primary encounter diagnosis)  Comment: discussed with Neha and Shaquille about Rhianna's continued and fast decline.  Discussed she had had recurrent multiple infections. Her memory is worse.  Cannot remember short term events.  She does not recall the 3 other kids right away that she had had.  She needs 2 assist now.  Discussed hospice care to start and they should have referral. Discussed also what to do with future infections etc.  Family has not decided at this time.  Plan:     (M62.81) Generalized muscle weakness  Comment: progressing  Plan:     (S01.01XD) Laceration of scalp without foreign body, subsequent encounter  Comment: removed staples, no complications  Plan:     No edema on her legs, just slight weight gain.  Discussed no pain right now, does not need any pain med scheduled.  Family " concurs too.    See Patient Instructions    Omar Olivo MD  [facility]

## 2018-08-06 NOTE — MR AVS SNAPSHOT
After Visit Summary   8/6/2018    Rhianna Gamboa    MRN: [id number]           Patient Information     Date Of Birth          6/19/1933        Visit Information        Provider Department      8/6/2018 10:00 AM Omar Olivo MD [facility]        Care Instructions    Please get the hospice consult.    6 staples were removed from your head.          Thank you for choosing [facility].  You may be receiving a survey in the mail from [employer] [facility] regarding your visit today.  Please take a few minutes to complete and return the survey to let us know how we are doing.      If you have questions or concerns, please contact us via [employer] or you can contact your care team at [phone].    Our Clinic hours are:  Monday 6:40 am  to 7:00 pm  Tuesday -Friday 6:40 am to 5:00 pm    The [place] outpatient lab hours are:  Monday - Friday 6:10 am to 4:45 pm  Saturdays 7:00 am to 11:00 am  Appointments are required, call [phone]    If you have clinical questions after hours or would like to schedule an appointment,  call the clinic at [phone].            Follow-ups after your visit        Who to contact     If you have questions or need follow up information about today's clinic visit or your schedule please contact [facility] directly at [phone].  Normal or non-critical lab and imaging results will be communicated to you by [employer], letter or phone within 4 business days after the clinic has received the results. If you do not hear from us within 7 days, please contact the clinic through [employer] or phone. If you have a critical or abnormal lab result, we will notify you by phone as soon as possible.  Submit refill requests through [employer] or call your pharmacy and they will forward the refill request to us. Please allow 3 business days for your refill to be completed.          Additional Information About Your Visit        Care EveryWhere ID     This is your Care  "EveryWhere ID. This could be used by other organizations to access your [facility] medical records  [id number]        Your Vitals Were     Pulse Temperature Height BMI (Body Mass Index)          72 98.3  F (36.8  C) (Tympanic) 5' 2.5\" (1.588 m) 30.24 kg/m2         Blood Pressure from Last 3 Encounters:   08/06/18 116/70   07/30/18 161/80   07/23/18 142/88    Weight from Last 3 Encounters:   08/06/18 168 lb (76.2 kg)   07/28/18 174 lb 13.2 oz (79.3 kg)   07/23/18 167 lb (75.8 kg)              Today, you had the following     No orders found for display         Today's Medication Changes          These changes are accurate as of 8/6/18 10:58 AM.  If you have any questions, ask your nurse or doctor.               These medicines have changed or have updated prescriptions.        Dose/Directions    Calcium + D3 600-200 MG-UNIT Tabs   This may have changed:    - how much to take  - when to take this   Used for:  Osteoporosis, unspecified osteoporosis type, unspecified pathological fracture presence        Dose:  1 tablet   Take 1 tablet by mouth 2 times daily   Quantity:  62 tablet   Refills:  11                Primary Care Provider Office Phone # Fax #    Omar Olivo [phone] [address] [facility] [address]        Equal Access to Services     SARAH DAVIDSON AH: Mian munozo Sotimo, waaxda luqadaha, qaybta kaalmada kevin, walter bonilla. So [facility] [phone].    ATENCIÓN: Si habla español, tiene a alexander disposición servicios gratuitos de asistencia lingüística. Jacinta al [phone].    We comply with applicable federal civil rights laws and [place] laws. We do not discriminate on the basis of race, color, national origin, age, disability, sex, sexual orientation, or gender identity.            Thank you!     Thank you for choosing [facility]  for your care. Our goal is always to provide you with excellent care. Hearing back from our " patients is one way we can continue to improve our services. Please take a few minutes to complete the written survey that you may receive in the mail after your visit with us. Thank you!             Your Updated Medication List - Protect others around you: Learn how to safely use, store and throw away your medicines at www.disposemymeds.org.          This list is accurate as of 8/6/18 10:58 AM.  Always use your most recent med list.                   Brand Name Dispense Instructions for use Diagnosis    ALEVE 220 MG capsule   Generic drug:  naproxen sodium      Take 220 mg by mouth 2 times daily (with meals). PRN        aspirin 81 MG tablet     100    1 TABLET BY MOUTH DAILY    Other and unspecified hyperlipidemia       Calcium + D3 600-200 MG-UNIT Tabs     62 tablet    Take 1 tablet by mouth 2 times daily    Osteoporosis, unspecified osteoporosis type, unspecified pathological fracture presence       cetirizine 10 MG tablet    zyrTEC     Take 10 mg by mouth daily        cyanocobalamin 1000 MCG tablet    vitamin  B-12    31 tablet    Take 1 tablet (1,000 mcg) by mouth daily    Alzheimer's dementia without behavioral disturbance, unspecified timing of dementia onset       IBUPROFEN PO      Take 800 mg by mouth every 8 hours as needed for moderate pain        levothyroxine 75 MCG tablet    SYNTHROID    31 tablet    Take 1 tablet (75 mcg) by mouth daily    Hypothyroidism, unspecified type       pyridoxine 100 MG tablet    VITAMIN B-6    31 tablet    Take 1 tablet (100 mg) by mouth daily    Alzheimer's dementia without behavioral disturbance, unspecified timing of dementia onset       RANITIDINE HCL PO      Take 75 mg by mouth as needed        simvastatin 10 MG tablet    ZOCOR    31 tablet    Take 1 tablet (10 mg) by mouth At Bedtime    Hyperlipidemia LDL goal <130       TAB-A-MILAD Tabs     31 tablet    TAKE 1 TABLET BY MOUTH ONCE DAILY    Routine general medical examination at a health care facility

## 2018-08-14 PROBLEM — Z51.5 HOSPICE CARE PATIENT: Status: ACTIVE | Noted: 2018-01-01

## 2018-08-14 NOTE — PROGRESS NOTES
[facility]  PRIMARY CARE PROVIDER AND CLINIC:  Giancarlo Barragan [address] [place] [address] / [facility] [address]  Chief Complaint   Patient presents with     [facility]     [facility] Medical Record Number:  [id number]    HPI:    Rhianna Gamboa is a 85 year old  (6/19/1933),admitted to the [facility] from Home.  Was inpatient at [facility].  Hospital stay 7/27/18 through 7/30/18.  Admitted to this facility for  rehab, medical management and nursing care.  HPI information obtained from: facility chart records, facility staff, patient report and [facility] chart review.  Current issues are:         Alzheimer's dementia without behavioral disturbance, unspecified timing of dementia onset  Falls frequently  Dysuria  Age-related osteoporosis without current pathological fracture  Generalized muscle weakness  Other chronic pain  Other specified hypothyroidism  Chronic seasonal allergic rhinitis due to pollen     Patient with above Dx/Hx, in May 2018 was very independent, now moved from AL to memory care with  hospice services, meds adjusted accordingly, patient AOx0, frequent falls out of bed- probably 10-12 in past 2-3 months, now using danita, non-ambulatory, today was pleasant, smiled, laughed, no conversation, requires assist with all ADL's, seems happy, no pain on faces scale, overall status is stable with chronic age-related and progressing cognitive and physical decline.    CODE STATUS/ADVANCE DIRECTIVES DISCUSSION:   DNR / DNI  Patient's living condition: lives alone    ALLERGIES:Codeine; Detrol [tolterodine tartrate]; Tylenol; and Other [seasonal allergies]  PAST MEDICAL HISTORY:  has a past medical history of Basal cell carcinoma and Malignant melanoma (H). She also has no past medical history of Squamous cell carcinoma.  PAST SURGICAL HISTORY:  has a past surgical history that includes Eye surgery (7/2009); Eye surgery (2010); Release carpal tunnel  (Right, 5/12/2015); Amputate finger(s) (Right, 1/20/2016); and Biopsy node sentinel (N/A, 1/20/2016).  FAMILY HISTORY: family history includes Breast Cancer in her daughter; C.A.D. in her father, maternal grandfather, and mother; Cancer in her maternal grandmother; Cerebrovascular Disease in her sister; Diabetes in her sister; HEART DISEASE in her sister, sister, and son.  SOCIAL HISTORY:  reports that she has never smoked. She has never used smokeless tobacco. She reports that she does not drink alcohol or use illicit drugs.    Post Discharge Medication Reconciliation Status: discharge medications reconciled and changed, per note/orders (see AVS).  Current Outpatient Prescriptions   Medication Sig Dispense Refill     cetirizine (ZYRTEC) 10 MG tablet Take 1 tablet (10 mg) by mouth daily as needed for allergies 30 tablet 11     levothyroxine (SYNTHROID) 75 MCG tablet Take 1 tablet (75 mcg) by mouth daily 31 tablet 11     LORazepam (ATIVAN) 0.5 MG tablet Take 0.5 tablets (0.25 mg) by mouth every 4 hours as needed for anxiety 20 tablet 5     morphine 5 MG solu-tab Take 1 tablet (5 mg) by mouth every 8 hours And 5mg Q2h PRN 90 tablet 0     RANITIDINE HCL PO Take 75 mg by mouth as needed        senna (SENOKOT) 8.6 MG tablet Take 2 tablets by mouth daily 120 tablet 11     sulfamethoxazole-trimethoprim (BACTRIM DS/SEPTRA DS) 800-160 MG per tablet Take 1 tablet by mouth 2 times daily for 3 days 6 tablet 0       ROS:  Unobtainable secondary to cognitive impairment.     Exam:  /70  Pulse 72  Temp 98.3  F (36.8  C)  Wt 168 lb (76.2 kg)  BMI 30.24 kg/m2  GENERAL APPEARANCE:  Alert, in no distress  ENT:  Mouth and posterior oropharynx normal, moist mucous membranes  RESP:  lungs clear to auscultation , no respiratory distress  CV:  regular rate and rhythm, no murmur, rub, or gallop, no edema  ABDOMEN:  bowel sounds normal  M/S:   Gait and station abnormal requires danita, full ADL assist  SKIN:  Inspection of skin and  subcutaneous tissue baseline  NEURO:   Examination of sensation by touch normal  PSYCH:  oriented to 0    Lab/Diagnostic data:     CBC RESULTS:   Recent Labs   Lab Test  07/30/18   0640  07/28/18   0615   WBC  9.7  14.5*   RBC  3.84  4.00   HGB  12.3  12.8   HCT  38.1  39.9   MCV  99  100   MCH  32.0  32.0   MCHC  32.3  32.1   RDW  13.4  13.5   PLT  237  212       Last Basic Metabolic Panel:  Recent Labs   Lab Test  07/28/18   0615  07/27/18   2304   NA  139  136   POTASSIUM  3.6  3.6   CHLORIDE  107  101   TANG  8.7  8.1*   CO2  27  26   BUN  14  17   CR  0.71  0.81   GLC  105*  116*       Liver Function Studies -   Recent Labs   Lab Test  07/27/18   2304  04/09/13   1010  03/14/12   1101   PROTTOTAL  6.9   --   7.2   ALBUMIN  2.9*   --   4.2   BILITOTAL  0.8   --   0.5   ALKPHOS  72   --   56   AST  24   --   22   ALT  18  21  <6       TSH   Date Value Ref Range Status   06/27/2018 3.69 0.40 - 4.00 mU/L Final   05/02/2017 3.15 0.40 - 4.00 mU/L Final   ]    No results found for: A1C    ASSESSMENT/PLAN:  Alzheimer's dementia without behavioral disturbance, unspecified timing of dementia onset  Falls frequently  -patient attempts to get out of bed, rolls off onto floor, was sent to ER on 7/23 and 7/27 for evaluation  -now moved from AL to memory care  -danita transfer,  mobility  -has hospital bed with floor mat  -no change in plan; stable with support    Dysuria  -suspected UTI on 8/9 per hospice evaluation  -started bactrim DS BID x3 days  -incontinent, urine output WNL, no odor today    Age-related osteoporosis without current pathological fracture  Generalized muscle weakness  Other chronic pain  -no pain present today on evaluation and staff transfer  -continues morphine solutabs 5mg Q8h and PRN  -no new concerns; staff to support    Other specified hypothyroidism  -most recent TSH was 6/27/17; result 3.69  -continue levothyroxine 75mcg Qd    Chronic seasonal allergic rhinitis due to pollen  -no s/s allergies  today, stable  -changed zyrtec to PRN  -hospice to follow, if having s/s may need to restart    Hospice care patient  -signed on with [facility] hospice team on 8/9/18; appreciate their support  -they will plan to see 1-2x/week  -medications reviewed as appropriate; continue         Electronically signed by:  JENNY Dillard CNP

## 2018-09-12 NOTE — TELEPHONE ENCOUNTER
"Requested Prescriptions   Pending Prescriptions Disp Refills     ibuprofen (ADVIL/MOTRIN) 800 MG tablet [Pharmacy Med Name: IBUPROFEN TAB 800MG]  Last Written Prescription Date:  historical  Last Fill Quantity: 0,  # refills: 0   Last office visit: 8/6/2018 with prescribing provider:  becca   Future Office Visit:      97     Sig: TAKE 1 TABLET BY MOUTH EVERY 8 HOURS AS NEEDED FOR MODERATE PAIN    NSAID Medications Failed    9/12/2018  3:08 PM       Failed - Patient is age 6-64 years       Passed - Blood pressure under 140/90 in past 12 months    BP Readings from Last 3 Encounters:   08/14/18 116/70   08/06/18 116/70   07/30/18 161/80                Passed - Normal ALT on file in past 12 months    Recent Labs   Lab Test  07/27/18   2304   ALT  18            Passed - Normal AST on file in past 12 months    Recent Labs   Lab Test  07/27/18   2304   AST  24            Passed - Recent (12 mo) or future (30 days) visit within the authorizing provider's specialty    Patient had office visit in the last 12 months or has a visit in the next 30 days with authorizing provider or within the authorizing provider's specialty.  See \"Patient Info\" tab in inbasket, or \"Choose Columns\" in Meds & Orders section of the refill encounter.           Passed - Normal CBC on file in past 12 months    Recent Labs   Lab Test  07/30/18   0640   WBC  9.7   RBC  3.84   HGB  12.3   HCT  38.1   PLT  237       For [facility] ONLY: PBCQ750 = WBC, KNIK990 = RBC         Passed - No active pregnancy on record       Passed - Normal serum creatinine on file in past 12 months    Recent Labs   Lab Test  07/28/18   0615   11/30/16   0825   CR  0.71   < >   --    CREAT   --    --   0.8    < > = values in this interval not displayed.            Passed - No positive pregnancy test in past 12 months        Porfirio Luu RT (R)    "

## 2018-09-12 NOTE — TELEPHONE ENCOUNTER
Routing refill request to provider for review/approval because:  Medication is reported/historical  Patient is 86 yo.    This RN cannot fill per [facility]  [place].      Liza MENON RN

## 2018-10-12 NOTE — TELEPHONE ENCOUNTER
"Routing refill request to provider for review/approval because:  Drug not active on patient's medication list      Miralax  Not on medication list.    Last office visit: 8/6/2018 with prescribing provider:  8/6/18   Future Office Visit:      Requested Prescriptions   Pending Prescriptions Disp Refills     polyethylene glycol (MIRALAX/GLYCOLAX) powder [Pharmacy Med Name: POLYETH GLYC POW 3350 NF] 527 g 98     Sig: MIX 17GM OF POWDER IN 8OZ OF WATER UNTIL COMPLETELY DISSOLVED. DRINK SOLUTION BY MOUTH EVERY MORNING HOLD FOR LOOSE STOOL    Laxatives Protocol Passed    10/11/2018  4:40 PM       Passed - Patient is age 6 or older       Passed - Recent (12 mo) or future (30 days) visit within the authorizing provider's specialty    Patient had office visit in the last 12 months or has a visit in the next 30 days with authorizing provider or within the authorizing provider's specialty.  See \"Patient Info\" tab in inbasket, or \"Choose Columns\" in Meds & Orders section of the refill encounter.              "

## 2018-11-20 NOTE — PROGRESS NOTES
[facility]    Chief Complaint   Patient presents with     RECHECK       [facility] Medical Record Number:  [id number]  Place of Service where encounter took place:  [facility] [[id number]]    HPI:    Rhianna Gamboa is a 85 year old  (6/19/1933), who is being seen today for an episodic care visit.  HPI information obtained from: facility chart records, facility staff, patient report and [facility] chart review.Today's concern is:     Alzheimer's dementia without behavioral disturbance, unspecified timing of dementia onset  Encounter for medication review  Hospice care patient  Other chronic pain     Patient seen today, experiencing slow but progressing decline in status over past few weeks, now requires feeding, weak, limited verbal response, has appropriate DME, medications reviewed, no distress, extremities still warm, not transitioning at this time, stable.    ALLERGIES: Codeine; Detrol [tolterodine tartrate]; Tylenol; and Other [seasonal allergies]  Past Medical, Surgical, Family and Social History reviewed and updated in [facility].    Current Outpatient Prescriptions   Medication Sig Dispense Refill     LORazepam (ATIVAN) 0.5 MG tablet Take 0.5 tablets (0.25 mg) by mouth every 4 hours as needed for anxiety 20 tablet 5     morphine 5 MG solu-tab Take 1 tablet (5 mg) by mouth every 8 hours And 5mg Q2h PRN 90 tablet 0     polyethylene glycol (MIRALAX/GLYCOLAX) powder MIX 17GM OF POWDER IN 8OZ OF WATER UNTIL COMPLETELY DISSOLVED. DRINK SOLUTION BY MOUTH EVERY MORNING HOLD FOR LOOSE STOOL 527 g 11     senna (SENOKOT) 8.6 MG tablet Take 2 tablets by mouth 3 times daily And 2 tabs PRN Qd 120 tablet 11     Medications reviewed:  Medications reconciled to facility chart and changes were made to reflect current medications as identified as above med list. Below are the changes that were made:   Medications stopped since last EPIC medication reconciliation:    There are no discontinued medications.    Medications started since last [facility] medication reconciliation:  No orders of the defined types were placed in this encounter.        REVIEW OF SYSTEMS:  Limited secondary to cognitive impairment but today pt reports I'm fine    Physical Exam:  /74  Pulse 71  Temp 98.7  F (37.1  C)  Resp 20  GENERAL APPEARANCE:  in no distress, somnolent  ENT:  Mouth and posterior oropharynx normal, moist mucous membranes  RESP:  no respiratory distress, crackles fine bronchial  CV:  regular rate and rhythm, no murmur, rub, or gallop, no edema  ABDOMEN:  bowel sounds normal  M/S:   Gait and station abnormal full assist all ADL's  SKIN:  Inspection of skin and subcutaneous tissue baseline  NEURO:   Examination of sensation by touch normal  PSYCH:  oriented to self    Recent Labs:     CBC RESULTS:   Recent Labs   Lab Test  07/30/18   0640  07/28/18   0615   WBC  9.7  14.5*   RBC  3.84  4.00   HGB  12.3  12.8   HCT  38.1  39.9   MCV  99  100   MCH  32.0  32.0   MCHC  32.3  32.1   RDW  13.4  13.5   PLT  237  212       Last Basic Metabolic Panel:  Recent Labs   Lab Test  07/28/18   0615  07/27/18   2304   NA  139  136   POTASSIUM  3.6  3.6   CHLORIDE  107  101   TANG  8.7  8.1*   CO2  27  26   BUN  14  17   CR  0.71  0.81   GLC  105*  116*       Liver Function Studies -   Recent Labs   Lab Test  07/27/18   2304  04/09/13   1010  03/14/12   1101   PROTTOTAL  6.9   --   7.2   ALBUMIN  2.9*   --   4.2   BILITOTAL  0.8   --   0.5   ALKPHOS  72   --   56   AST  24   --   22   ALT  18  21  <6       TSH   Date Value Ref Range Status   06/27/2018 3.69 0.40 - 4.00 mU/L Final   05/02/2017 3.15 0.40 - 4.00 mU/L Final   ]    No results found for: A1C      Assessment/Plan:  Alzheimer's dementia without behavioral disturbance, unspecified timing of dementia onset  Encounter for medication review  Hospice care patient  -followed by  hospice team, appreciate their support  -[facility] evaluated  also today, spoke over phone regarding plan and medications, family concurs  -discontinue ibuprofen, utilize mophine instead, discontinue levothyroxine, discontinue ranitidine, discontinue PRN cetirizine and ibuprofen PRN  -status progressive decline, stable  -staff to support/feed as indicated/tolerated        Electronically signed by  JENNY Dillard CNP

## 2018-11-27 NOTE — PROGRESS NOTES
[place] [facility]    Chief Complaint   Patient presents with     KATIUSKA       [facility] Record Number:  [id number]  Place of Service where encounter took place:  [facility] [place] [facility] [[id number]]    HPI:    Rhianna Gamboa is a 85 year old  (6/19/1933), who is being seen today for an episodic care visit.  HPI information obtained from: facility chart records, facility staff, patient report and [facility] chart review.Today's concern is:     Dysphagia, unspecified type  Alzheimer's dementia without behavioral disturbance, unspecified timing of dementia onset  Hospice care patient     Patient seen today as hospice not in building, lying in bed, no distress, unable to interact although slightly opened eyes with stimuli, BS+, no intake since 11/25, unable to swallow medication, med list reviewed, overall status stable, appears to be transitioning at this time.    ALLERGIES: Codeine; Detrol [tolterodine tartrate]; Tylenol; and Other [seasonal allergies]  Past Medical, Surgical, Family and Social History reviewed and updated in [facility].    Current Outpatient Prescriptions   Medication Sig Dispense Refill     bisacodyl (DULCOLAX) 10 MG suppository Place 1 suppository (10 mg) rectally daily as needed for constipation 25 suppository 1     LORazepam (ATIVAN) 0.5 MG tablet Take 0.5 tablets (0.25 mg) by mouth every 4 hours as needed for anxiety 20 tablet 5     morphine 5 MG solu-tab Take 1 tablet (5 mg) by mouth every 8 hours And 5mg Q2h PRN 90 tablet 0     Medications reviewed:  Medications reconciled to facility chart and changes were made to reflect current medications as identified as above med list. Below are the changes that were made:   Medications stopped since last EPIC medication reconciliation:   There are no discontinued medications.    Medications started since last [facility] medication reconciliation:  No orders of the defined types were placed in this  encounter.        REVIEW OF SYSTEMS:  Unobtainable secondary to cognitive impairment.     Physical Exam:  /87  Pulse 71  Temp 99.4  F (37.4  C)  Resp 20  GENERAL APPEARANCE:  in no distress, somnolent  ENT:  Mouth and posterior oropharynx normal, moist mucous membranes  RESP:  lungs clear to auscultation   CV:  regular rate and rhythm, no murmur, rub, or gallop, no edema  ABDOMEN:  bowel sounds normal  M/S:   Gait and station abnormal unable to transfer/ambulate  SKIN:  Inspection of skin and subcutaneous tissue baseline  NEURO:   Examination of sensation by touch normal  PSYCH:  oriented to unknown    Recent Labs: Patient on hospice, no labs      Assessment/Plan:  Dysphagia, unspecified type  Alzheimer's dementia without behavioral disturbance, unspecified timing of dementia onset  Hospice care patient  -status is progressive decline, starting to transition with no intake since 11/25, unable to swallow pills  -discontinue miralax, senna  -continue bisacodyl supp every day  PRN  -medications reviewed as appropriate  -hospice to follow up later this week and check on status    Electronically signed by  JENNY Dillard CNP

## 2019-10-28 NOTE — TELEPHONE ENCOUNTER
Please apologize for me. I forgot to fill this out. Will do it this weekend.     CY   Air/17 annie Berry

## 2022-06-23 NOTE — ED NOTES
Pt up with assist of 2 to bedside commode, pt did urinate in the commode and was also incontinent of urine.    SBIRT referral

## 2022-08-25 NOTE — PLAN OF CARE
Problem: Patient Care Overview  Goal: Plan of Care/Patient Progress Review  Pt sat up in recliner all evening. Up with 2 assist, gait belt & walker to transfer back to bed. Pt stiff from sitting, has pain in left hip & back. Slow to move, needed direction to take few steps. Ibuprofen given for pain.        [Time Spent: ___ minutes] : I have spent [unfilled] minutes of time on the encounter.